# Patient Record
Sex: FEMALE | Race: WHITE | NOT HISPANIC OR LATINO | Employment: STUDENT | URBAN - METROPOLITAN AREA
[De-identification: names, ages, dates, MRNs, and addresses within clinical notes are randomized per-mention and may not be internally consistent; named-entity substitution may affect disease eponyms.]

---
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
## 2020-02-10 ENCOUNTER — HEALTH MAINTENANCE LETTER (OUTPATIENT)
Age: 20
End: 2020-02-10

## 2020-06-17 ENCOUNTER — TELEPHONE (OUTPATIENT)
Dept: FAMILY MEDICINE | Facility: CLINIC | Age: 20
End: 2020-06-17

## 2020-06-17 NOTE — TELEPHONE ENCOUNTER
Reason for call:  Other   Patient called regarding (reason for call): Pt requesting referral to [facility] in [place] for allergy induced asthma.     Additional comments:     Phone number to reach patient:  Cell number on file:    Telephone Information:   Mobile [phone]       Best Time:  anytime    Can we leave a detailed message on this number?  YES

## 2020-06-17 NOTE — TELEPHONE ENCOUNTER
Left detailed VM for pt  Last OV 2018  Advised virtual visit to discuss referral    Therese GUNN RN

## 2020-07-07 ENCOUNTER — VIRTUAL VISIT (OUTPATIENT)
Dept: FAMILY MEDICINE | Facility: CLINIC | Age: 20
End: 2020-07-07
Payer: COMMERCIAL

## 2020-07-07 VITALS — BODY MASS INDEX: 26.5 KG/M2 | WEIGHT: 190 LBS

## 2020-07-07 DIAGNOSIS — F43.23 ADJUSTMENT DISORDER WITH MIXED ANXIETY AND DEPRESSED MOOD: Primary | ICD-10-CM

## 2020-07-07 PROCEDURE — 99214 OFFICE O/P EST MOD 30 MIN: CPT | Mod: 95 | Performed by: FAMILY MEDICINE

## 2020-07-07 RX ORDER — ESCITALOPRAM OXALATE 10 MG/1
10 TABLET ORAL DAILY
Qty: 30 TABLET | Refills: 1 | Status: SHIPPED | OUTPATIENT
Start: 2020-07-07 | End: 2020-08-26

## 2020-07-07 ASSESSMENT — ANXIETY QUESTIONNAIRES
GAD7 TOTAL SCORE: 6
5. BEING SO RESTLESS THAT IT IS HARD TO SIT STILL: NOT AT ALL
2. NOT BEING ABLE TO STOP OR CONTROL WORRYING: SEVERAL DAYS
IF YOU CHECKED OFF ANY PROBLEMS ON THIS QUESTIONNAIRE, HOW DIFFICULT HAVE THESE PROBLEMS MADE IT FOR YOU TO DO YOUR WORK, TAKE CARE OF THINGS AT HOME, OR GET ALONG WITH OTHER PEOPLE: SOMEWHAT DIFFICULT
6. BECOMING EASILY ANNOYED OR IRRITABLE: SEVERAL DAYS
7. FEELING AFRAID AS IF SOMETHING AWFUL MIGHT HAPPEN: SEVERAL DAYS
3. WORRYING TOO MUCH ABOUT DIFFERENT THINGS: SEVERAL DAYS
1. FEELING NERVOUS, ANXIOUS, OR ON EDGE: SEVERAL DAYS

## 2020-07-07 ASSESSMENT — PATIENT HEALTH QUESTIONNAIRE - PHQ9
5. POOR APPETITE OR OVEREATING: SEVERAL DAYS
SUM OF ALL RESPONSES TO PHQ QUESTIONS 1-9: 11

## 2020-07-07 NOTE — NURSING NOTE
"Chief Complaint   Patient presents with     Anxiety     Depression     initial Wt 86.2 kg (190 lb)   BMI 26.50 kg/m   Estimated body mass index is 26.5 kg/m  as calculated from the following:    Height as of 6/25/18: 1.803 m (5' 11\").    Weight as of this encounter: 86.2 kg (190 lb).  BP completed using cuff size: .  L  R arm      Health Maintenance that is potentially due pending provider review:        Laci Mills ma  "

## 2020-07-07 NOTE — PROGRESS NOTES
"Yady Sellers is a 19 year old female who is being evaluated via a billable video visit.      The patient has been notified of following:     \"This video visit will be conducted via a call between you and your physician/provider. We have found that certain health care needs can be provided without the need for an in-person physical exam.  This service lets us provide the care you need with a video conversation.  If a prescription is necessary we can send it directly to your pharmacy.  If lab work is needed we can place an order for that and you can then stop by our lab to have the test done at a later time.    Video visits are billed at different rates depending on your insurance coverage.  Please reach out to your insurance provider with any questions.    If during the course of the call the physician/provider feels a video visit is not appropriate, you will not be charged for this service.\"    Patient has given verbal consent for Video visit? Yes  How would you like to obtain your AVS? MyChart  Patient would like the video invitation sent by:   Will anyone else be joining your video visit? No      Subjective     Yady Slelers is a 19 year old female who presents today via video visit for the following health issues:    HPI  Depression and Anxiety Follow-Up    How are you doing with your depression since your last visit? Worsened     How are you doing with your anxiety since your last visit?  Worsened     Are you having other symptoms that might be associated with depression or anxiety? No    Have you had a significant life event? OTHER: less motivation     Do you have any concerns with your use of alcohol or other drugs? No    Social History     Tobacco Use     Smoking status: Never Smoker     Smokeless tobacco: Never Used   Substance Use Topics     Alcohol use: None     Drug use: None     PHQ 7/7/2020   PHQ-9 Total Score 11   Q9: Thoughts of better off dead/self-harm past 2 weeks Not at all     ROLLY-7 SCORE 7/7/2020 "   Total Score 6           Suicide Assessment Five-step Evaluation and Treatment (SAFE-T)      How many servings of fruits and vegetables do you eat daily?  4 or more    On average, how many sweetened beverages do you drink each day (Examples: soda, juice, sweet tea, etc.  Do NOT count diet or artificially sweetened beverages)?   1    How many days per week do you exercise enough to make your heart beat faster? 4    How many minutes a day do you exercise enough to make your heart beat faster? 30 - 60    How many days per week do you miss taking your medication? 0       Video Start Time: 3:52 PM    Nicholas [employer] but its going to open/hybrid program & is worried how will she manage in [place]  Doing school online was hard but was thinking that fall semster would be better- grieving absence of friends in fall semseter  No symptoms of MDP but had high energy 2 weeks ago- did not sleep over night and wrote a song.  Mom and sister also on selective serotonin reuptake inhibitor. Mom lexpro and sister on citalopram.    Reviewed and updated as needed this visit by Provider         Review of Systems   Constitutional, HEENT, cardiovascular, pulmonary, GI, , musculoskeletal, neuro, skin, endocrine and psych systems are negative, except as otherwise noted.      Objective             Physical Exam     GENERAL: Healthy, alert and no distress  EYES: Eyes grossly normal to inspection.  No discharge or erythema, or obvious scleral/conjunctival abnormalities.  RESP: No audible wheeze, cough, or visible cyanosis.  No visible retractions or increased work of breathing.    SKIN: Visible skin clear. No significant rash, abnormal pigmentation or lesions.  NEURO: Cranial nerves grossly intact.  Mentation and speech appropriate for age.  PSYCH: Mentation appears normal, affect normal/bright, judgement and insight intact, normal speech and appearance well-groomed.      Diagnostic Test Results:  Labs reviewed in Epic  none         Assessment &  Plan   20 yo female with long standing history of anxiety and depression . Willing to star counsleing and medications   lexparo 10 mg once daily       ICD-10-CM    1. Adjustment disorder with mixed anxiety and depressed mood  F43.23 MENTAL HEALTH REFERRAL  - Adult; Outpatient Treatment; Individual/Couples/Family/Group Therapy/Health Psychology; G: [facility] [phone]; We will contact you to schedule the appointment or please call with any questions     escitalopram (LEXAPRO) 10 MG tablet   We discussed the treatment for anxiety and depression in detail.  The importance of a multi faceted approach in controlling symptoms was reviewed.  The benefits of cognitive behavioral therapy reviewed, benefits of exercise, and stress reduction also discussed.      Duration of treatment  Is long months to years. It may take 3-4 weeks before symptom improvement happens.  Do not stop medication suddenly, medication will need to be tapered off.  Slight increased risk of suicide with SSRI group of medications discussed.         Potential medication side effects were discussed with the patient; let me know if any occur.         Regular exercise    Return in about 2 weeks (around 7/21/2020) for virtual/video visit.    Adriana Jimenez MD  [facility]      Video-Visit Details    Type of service:  Video Visit    Video End Time:4:13 PM    Originating Location (pt. Location): Home    Distant Location (provider location):  [facility]     Platform used for Video Visit: AmWell    Return in about 2 weeks (around 7/21/2020) for virtual/video visit.       Adriana Jimenez MD

## 2020-07-08 ASSESSMENT — ANXIETY QUESTIONNAIRES: GAD7 TOTAL SCORE: 6

## 2020-07-27 ASSESSMENT — ANXIETY QUESTIONNAIRES
7. FEELING AFRAID AS IF SOMETHING AWFUL MIGHT HAPPEN: NOT AT ALL
3. WORRYING TOO MUCH ABOUT DIFFERENT THINGS: SEVERAL DAYS
5. BEING SO RESTLESS THAT IT IS HARD TO SIT STILL: NOT AT ALL
6. BECOMING EASILY ANNOYED OR IRRITABLE: NOT AT ALL
GAD7 TOTAL SCORE: 4
2. NOT BEING ABLE TO STOP OR CONTROL WORRYING: SEVERAL DAYS
7. FEELING AFRAID AS IF SOMETHING AWFUL MIGHT HAPPEN: NOT AT ALL
1. FEELING NERVOUS, ANXIOUS, OR ON EDGE: SEVERAL DAYS
GAD7 TOTAL SCORE: 4
4. TROUBLE RELAXING: SEVERAL DAYS

## 2020-07-27 ASSESSMENT — PATIENT HEALTH QUESTIONNAIRE - PHQ9
SUM OF ALL RESPONSES TO PHQ QUESTIONS 1-9: 5
10. IF YOU CHECKED OFF ANY PROBLEMS, HOW DIFFICULT HAVE THESE PROBLEMS MADE IT FOR YOU TO DO YOUR WORK, TAKE CARE OF THINGS AT HOME, OR GET ALONG WITH OTHER PEOPLE: SOMEWHAT DIFFICULT
SUM OF ALL RESPONSES TO PHQ QUESTIONS 1-9: 5

## 2020-07-28 ENCOUNTER — VIRTUAL VISIT (OUTPATIENT)
Dept: PSYCHOLOGY | Facility: CLINIC | Age: 20
End: 2020-07-28
Payer: COMMERCIAL

## 2020-07-28 DIAGNOSIS — F32.1 MAJOR DEPRESSIVE DISORDER, SINGLE EPISODE, MODERATE (H): ICD-10-CM

## 2020-07-28 DIAGNOSIS — F41.1 GAD (GENERALIZED ANXIETY DISORDER): Primary | ICD-10-CM

## 2020-07-28 PROCEDURE — 90791 PSYCH DIAGNOSTIC EVALUATION: CPT | Mod: 95 | Performed by: SOCIAL WORKER

## 2020-07-28 ASSESSMENT — COLUMBIA-SUICIDE SEVERITY RATING SCALE - C-SSRS
ATTEMPT PAST THREE MONTHS: NO
1. IN THE PAST MONTH, HAVE YOU WISHED YOU WERE DEAD OR WISHED YOU COULD GO TO SLEEP AND NOT WAKE UP?: NO
1. IN THE PAST MONTH, HAVE YOU WISHED YOU WERE DEAD OR WISHED YOU COULD GO TO SLEEP AND NOT WAKE UP?: NO
ATTEMPT LIFETIME: NO

## 2020-07-28 ASSESSMENT — PATIENT HEALTH QUESTIONNAIRE - PHQ9: SUM OF ALL RESPONSES TO PHQ QUESTIONS 1-9: 5

## 2020-07-28 ASSESSMENT — ANXIETY QUESTIONNAIRES: GAD7 TOTAL SCORE: 4

## 2020-07-28 NOTE — PROGRESS NOTES
"  [facility]  Evaluator Name:  Elle Gonzalez     Credentials:  MSW, LICSW LMFT    PATIENT'S NAME: Yady Sellers  PREFERRED NAME: Yady  PREFERRED PRONOUNS:   lauren    MRN:   [id number]  :   2000   ACCT. NUMBER: [id number]  DATE OF SERVICE: 20  START TIME: 2:15PM    Late start. Video would not connect  END TIME: 3:15PM  PREFERRED PHONE: [phone]  May we leave a program related message: Yes  Service Modality:  Video Visit:    Telemedicine Visit: The patient's condition can be safely assessed and treated via synchronous audio and visual telemedicine encounter.      Reason for Telemedicine Visit: COVID 19 restricitions    Originating Site (Patient Location): Patient's home    [facility]    Consent:  The patient/guardian has verbally consented to: the potential risks and benefits of telemedicine (video visit) versus in person care; bill my insurance or make self-payment for services provided; and responsibility for payment of non-covered services.     Patient would like the video invitation sent by: Text to cell phone: [phone]  or via [employer]}     Mode of Communication:  Video Conference via [employer]    As the provider I attest to compliance with applicable laws and regulations related to telemedicine.    STANDARD ADULT DIAGNOSTIC ASSESSMENT      Identifying Information:  Patient is a 19 year old, .  The pronoun use throughout this assessment reflects the patient's chosen pronoun.  Patient was referred for an assessment by primary care provider.  Patient attended the session alone.     Chief Complaint:   The reason for seeking services at this time is: \" anxiety and depression \"   The problem(s) began the anxiety started when she was young. The depression started with returning from college due to COVID 19 in March/April. Patient has attempted to resolve these concerns in the past through therapy in collegein her " Freshman and sophomore years. She has begun Lexapro through her PCP over the past few weeks.    Social/Family History:  Patient reported they grew up in [place], [place].  They were raised by biological parents.   stayed ..   Patient reported that  her   childhood was okay.  Patient described their current relationships with family of origin as good.      The patient describes their cultural background as .  Cultural influences and impact on patient's life structure, values, norms, and healthcare: none reported.  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    These factors will be addressed in the Preliminary Treatment plan.  Patient identified their preferred language to be English. Patient reported they does not need the assistance of an  or other support involved in therapy.     Patient reported had no significant delays in developmental tasks.   Patient's highest education level was has completed her sophomore year at [employer]. Plans on going to medical school when she completes college. Patient identified the following learning problems: none reported.  Modifications will not be used to assist communication in therapy.   Patient reports she is  able to understand written materials.    Patient reported the following relationship history: reported no significant romantic relationships.  Patient's current relationship status is single .   Patient identified their sexual orientation as not reported.  Patient reported having zero child(art). Patient identified parents, siblings and friends as part of their support system.  Patient identified the quality of these relationships as good.      Patient's current living/housing situation involves staying with parents and older sister in her parents  home. 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 She reports that housing is stable.     Patient is currently not working. Is taking a [employer] On line class from the .[employer] [facility] [employer]  Patient reports their finances are obtained through parents.  Patient does not identify finances as a current stressor.      Patient reported that they have not been involved with the legal system. Patient denies being on probation / parole / under the jurisdiction of the court.        Patient's Strengths and Limitations:  Patient identified the following strengths or resources that will help them succeed in treatment: friends / good social support, family support, insight, intelligence, positive school connection, motivation and work ethic. Things that may interfere with the patient's success in treatment include: COVID 19 restrictions, current symptoms.   _______________________________________________  Personal and Family Medical History:   Patient did report a family history of mental health concerns. Her mother and sister struggle with depression and anxiety. Her sister was taking a year off of college due to her depression.Client has grown up with this depression as part of her life. Her  Maternal aunt is bipolar. Her paternal grandmother has bipolar disorder. Her paternal great uncle is schizophrenic .     Patient reported the following previous diagnoses which include(s): is not sure, but assumes anxiety.  Patient reported anxiety  symptoms began when she was young,. A psychiatrist at college asked her about phobias She remembers in 3rd-4th grade being afraid to be in elevators. When she was in 7th grade she was afraid of walking alone. She had a violin lesson 2 blocks from their home and was afraid to walk that distance. She sometimes will feel that feeling of un safety now. She was fine when she left college, though wax being  cautious. She feels she has gotten fearful of COVID 19, though does have asthma. She feels less fearful now, but needs to limit the amount of news she watches. She has been able to have some in person time with friends. She has chosen not to return to college in the Fall, but to take her classes on line. The depression is new. She has experienced those symptoms since returning home form college in March/Aprl  Patient has received mental health services in the past: therapy with therapist at college in her Freshman year of college and beginning of Sophomore year, before returning home due to COVID 19.  Psychiatric Hospitalizations: None.  Patient denies a history of civil commitment.  Currently, patient is receiving other mental health services.  These include primary care provider at [facility].  For follow-up on medication.   Patient has had a physical exam to rule out medical causes for current symptoms.  Date of last physical exam was within the past year. Client was encouraged to follow up with PCP if symptoms were to develop. The patient has a [facility] Primary Care Provider, who is named Saul Ramires, and is currently seeing Dr. Adriana Jimenez..  Patient reports the following current medical concerns: asthma .  There are not significant appetite / nutritional concerns / weight changes.  She has gained a small amount of weight during COVID 19 Patient does not report a history of head injury / trauma / cognitive impairment.      Patient reports current meds as:   Lexapro 10 mg    Medication Adherence:  Patient reports taking prescribed medications as prescribed.    Patient Allergies:  No Known Allergies    Medical History:  asthma      Current Mental Status Exam:   Appearance:  Appropriate    Eye Contact:  Good   Psychomotor:  unable to assess due to video session       Gait / station:  Unable to assess  Attitude / Demeanor: Cooperative   Speech      Rate / Production: Normal/ Responsive       Volume:  Normal  volume      Language:  intact  Mood:   Anxious  Normal  Affect:   Appropriate    Thought Content: Clear   Thought Process: Coherent  Logical       Associations: No loosening of associations  Insight:   Good   Judgment:  Intact   Orientation:  All  Attention/concentration: Good    Rating Scales:    PHQ9:    PHQ-9 SCORE 7/7/2020 7/27/2020 7/27/2020   PHQ-9 Total Score MyChart - - 5 (Mild depression)   PHQ-9 Total Score 11 5 5   ;    GAD7:    ROLLY-7 SCORE 7/7/2020 7/27/2020 7/27/2020   Total Score - - 4 (minimal anxiety)   Total Score 6 4 4     CGI:     First:Considering your total clinical experience with this particular patient population, how severe are the patient's symptoms at this time?: 4 (7/28/2020  5:00 PM)  ;    Most recentCompared to the patient's condition at the START of treatment, this patient's condition is: 4 (7/28/2020  5:00 PM)      Substance Use:  Patient did report a family history of substance use concerns; see medical history section for details.She did not have details but repotted alcoholism ran in her family  Patient has not received chemical dependency treatment in the past.  Patient has not ever been to detox.      Patient is not currently receiving any chemical dependency treatment. Patient reported the following problems as a result of their substance use: none reported.    Patient denies using alcohol.very often. Sometimes has a glass of wine with her parents. In college would go out once a month and drink 4-5 drinks at a time. Client encouraged to discuss alcohol use with medication with PCP  Patient denies using tobacco.  Patient denies using marijuana.  Patient reports using caffeine 1 times per day and drinks 1 at a time. Patient started using caffeine at age not reported. Will dirnk one cup of coffee in moring and one in afernoon at college.  Patient reports using/abusing the following substance(s). Patient reported no other substance use.     CAGE- AID:    CAGE-AID  Total Score 7/28/2020   Total Score 0       Substance Use: No symptoms    Based on the negative CAGE score and clinical interview there  are not indications of drug or alcohol abuse.    Significant Losses / Trauma / Abuse / Neglect Issues:   Patient did not serve in the .  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  Concerns for possible neglect are not present.     Safety Assessment:   Current Safety Concerns:  [place] Suicide Severity Rating Scale (Lifetime/Recent)  [place] Suicide Severity Rating (Lifetime/Recent) 7/28/2020   1. Wish to be Dead (Lifetime) No   1. Wish to be Dead (Recent) No   Actual Attempt (Lifetime) No   Actual Attempt (Past 3 Months) No     Patient denies current homicidal ideation and behaviors.  Patient denies current self-injurious ideation and behaviors.    Patient denied risk behaviors associated with substance use.  Patient denies any high risk behaviors associated with mental health symptoms.  Patient reports the following current concerns for their personal safety: None.  Patient reports there are not  firearms in the house.      History of Safety Concerns:  Patient denied a history of homicidal ideation.     Patient denied a history of personal safety concerns.    Patient denied a history of assaultive behaviors.    Patient denied a history of sexual assault behaviors.     Patient denied a history of risk behaviors associated with substance use.  Patient denies any history of high risk behaviors associated with mental health symptoms.  Patient reports the following protective factors: positive  relationships positive social network, forward/future oriented thinking, dedication to family/friends, purpose plans on going to medical school, secure attachment, adherence with prescribed medication, living with other people, structured day, committment to well-being and sense of meaning      Answers for HPI/ROS submitted by the patient on 7/27/2020   ROLLY 7 TOTAL SCORE: 4  If you checked off any problems, how difficult have these problems made it for you to do your work, take care of things at home, or get along with other people?: Somewhat difficult  PHQ9 TOTAL SCORE: 5      Risk Plan:  See Preliminary Treatment Plan for Safety and Risk Management Plan    Review of Symptoms per patient report:  Depression: No symptoms, Lack of interest, Change in energy level, Difficulties concentrating, Psychomotor slowing or agitation and Feeling sad, down, or depressed  Josefa:  No Symptoms  Psychosis: No Symptoms  Anxiety: Excessive worry, Ruminations and Poor concentration  Panic:  No symptoms  Post Traumatic Stress Disorder:  No Symptoms   Eating Disorder: No Symptoms  ADD / ADHD:  No symptoms  Conduct Disorder: No symptoms  Autism Spectrum Disorder: No symptoms  Obsessive Compulsive Disorder: No Symptoms    Patient reports the following compulsive behaviors and treatment history: none reported.      Diagnostic Criteria:   A. Excessive anxiety and worry about a number of events or activities (such as work or school performance).   B. The person finds it difficult to control the worry.   - Restlessness or feeling keyed up or on edge.    - Being easily fatigued.    - Difficulty concentrating or mind going blank.    - Muscle tension.   D. The focus of the anxiety and worry is not confined to features of an Axis I disorder.  E. The anxiety, worry, or physical symptoms cause clinically significant distress or impairment in social, occupational, or other important areas of functioning.   F. The disturbance is not due to the direct  physiological effects of a substance (e.g., a drug of abuse, a medication) or a general medical condition (e.g., hyperthyroidism) and does not occur exclusively during a Mood Disorder, a Psychotic Disorder, or a Pervasive Developmental Disorder.    - The aformentioned symptoms began 15 year(s) ago and occurs 7 days per week and is experienced as moderate.  A) Single episode - symptoms have been present during the same 2-week period and represent a change from previous functioning 5 or more symptoms (required for diagnosis)   - Depressed mood. Note: In children and adolescents, can be irritable mood.     - Diminished interest or pleasure in all, or almost all, activities.    - Psychomotor activity retardation.    - Fatigue or loss of energy.    - Diminished ability to think or concentrate, or indecisiveness.   B) The symptoms cause clinically significant distress or impairment in social, occupational, or other important areas of functioning  C) The episode is not attributable to the physiological effects of a substance or to another medical condition  D) The occurence of major depressive episode is not better explained by other thought / psychotic disorders  E) There has never been a manic episode or hypomanic episode    Functional Status:  Patient reports the following functional impairments: academic performance, home life with family, relationship(s) and self-care.     WHODAS:   WHODAS 2.0 Total Score 7/28/2020   Total Score 15       Clinical Summary:  1. Reason for assessment: depression and anxiety  .  2. Psychosocial, Cultural and Contextual Factors: sister has significant depression, mother has depression, COVID 19 restrictions. Loss of summer internship at college  .  3. Principal DSM5 Diagnoses  (Sustained by DSM5 Criteria Listed Above):   296.22 (F32.1)  Major Depressive Disorder, Single Episode, Moderate _ and With mixed features  300.02 (F41.1) Generalized Anxiety Disorder.  4. Other Diagnoses that is  relevant to services:   None at this time  5. Provisional Diagnosis:  None at this time .  6. Prognosis: Expect Improvement.  7. Likely consequences of symptoms if not treated: has started medication with some decrease in symptoms, but has benefited from therapy in past,  Will likely struggle longer without support.  8. Client strengths include:  caring, educated, empathetic, goal-focused, has a previous history of therapy, insightful, intelligent, motivated and open to learning .     Recommendations:     1. Plan for Safety and Risk Management:Recommended that patient call 911 or go to the local ED should there be a change in any of these risk factors..  Report to child / adult protection services was NA.     2. Patient's identified none reported.     3. Initial Treatment will focus on: Depressed Mood - decrease symptoms  Anxiety - decrease symptoms.     4. Resources/Service Plan:       services are not indicated.     Modifications to assist communication are not indicated.     Additional disability accommodations are not indicated.      5. Collaboration:  Collaboration / coordination of treatment will be initiated with the following support professionals: primary care physician.      6.  Referrals:  The following referral(s) will be initiated: none at this time. Next Scheduled Appointment: 8/5/2020. Meeting with PCP for medication recheck on 8/3.  A Release of Information will be offered for the following: outpatient therapist at college.    7. LUDA: LUDA:  Discussed the general effects of drugs and alcohol on health and well-being. Provider will mail patient printed information about the effects of chemical use on their health and well being. Recommendations:  Discuss medication and alcohol use with PCP before returning to college .     8. Records were reviewed at time of assessment.  Information in this assessment was obtained from the medical record and provided by patient who is a good historian.    Patient will have open access to their mental health medical record.      Eval type:  Mental Health    Staff Name/Credentials:  Elle Gonzalez [facility], LMFT  July 28, 2020

## 2020-08-03 ENCOUNTER — APPOINTMENT (OUTPATIENT)
Dept: FAMILY MEDICINE | Facility: CLINIC | Age: 20
End: 2020-08-03
Payer: COMMERCIAL

## 2020-08-05 ENCOUNTER — VIRTUAL VISIT (OUTPATIENT)
Dept: PSYCHOLOGY | Facility: CLINIC | Age: 20
End: 2020-08-05
Payer: COMMERCIAL

## 2020-08-05 DIAGNOSIS — F32.1 MAJOR DEPRESSIVE DISORDER, SINGLE EPISODE, MODERATE (H): ICD-10-CM

## 2020-08-05 DIAGNOSIS — F41.1 GAD (GENERALIZED ANXIETY DISORDER): Primary | ICD-10-CM

## 2020-08-05 PROCEDURE — 90837 PSYTX W PT 60 MINUTES: CPT | Mod: 95 | Performed by: SOCIAL WORKER

## 2020-08-05 PROCEDURE — 99207 ZZC CDG-CODE INCORRECT PER BILLING BASED ON TIME: CPT | Performed by: SOCIAL WORKER

## 2020-08-05 NOTE — LETTER
First session after intake assessment. No longer experiencing side affects. Unsure if medication is kicked in yet, feels some improvement. If no increaed improvement may need higher dose.

## 2020-08-05 NOTE — PROGRESS NOTES
Progress Note    Patient Name: Yady Sellers  Date: 8/5/2020         Service Type: Individual      Session Start Time: 2:05PM  Session End Time: 3:00PM     Session Length: 55 minutes    Session #:2    Attendees: Client    Service Modality:  Video Visit:    Telemedicine Visit: The patient's condition can be safely assessed and treated via synchronous audio and visual telemedicine encounter.      Reason for Telemedicine Visit: COVID 19 restricitions    Originating Site (Patient Location): Patient's home    Distant Site (Provider Location): [facility] [place]    Consent:  The patient/guardian has verbally consented to: the potential risks and benefits of telemedicine (video visit) versus in person care; bill my insurance or make self-payment for services provided; and responsibility for payment of non-covered services.     Patient would like the video invitation sent by: Text to cell phone: [phone]}     Mode of Communication:  Video Conference via Amwell    As the provider I attest to compliance with applicable laws and regulations related to telemedicine.     Treatment Plan Last Reviewed:will be written on this date 8/5/2020  PHQ-9 / ROLLY-7 : 7/28/2020    DATA  Interactive Complexity: No  Crisis: No       Progress Since Last Session (Related to Symptoms / Goals / Homework):   Symptoms: No change first session after intake assessment    Homework: Achieved / completed to satisfaction flynn goals for treatment plan      Episode of Care Goals: Minimal progress - PREPARATION (Decided to change - considering how); Intervened by negotiating a change plan and determining options / strategies for behavior change, identifying triggers, exploring social supports, and working towards setting a date to begin behavior change     Current / Ongoing Stressors and Concerns:   COVID 19 restrictions              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 Discussed preparing for on line college, and making a scheudle for breaks, eating, social time and exercise, as there will not be these natural breaks in her on line experience. Client feels good about her decision, but is missing her friends. Explored how to continue to stay connected.Client is feeling some benefits from medication, but feels could use more. Unsure if medication is not fully kicked in yet, or, if client needs higher dose. Client will discus with PCP        ASSESSMENT: Current Emotional / Mental Status (status of significant symptoms):   Risk status (Self / Other harm or suicidal ideation)   Patient denies current fears or concerns for personal safety.   Patient denies current or recent suicidal ideation or behaviors.   Patient denies current or recent homicidal ideation or behaviors.   Patient denies current or recent self injurious behavior or ideation.   Patient denies other safety concerns.   Patient reports there has been no change in risk factors since their last session.     Patient reports there has been no change in protective factors since their last session.     Recommended that patient call 911 or go to the local ED should there be a change in any of these risk factors.     Appearance:   Appropriate    Eye Contact:   Good    Psychomotor Behavior: unable to assess due to video session    Attitude:   Cooperative    Orientation:   All   Speech    Rate / Production: Normal     Volume:  Normal    Mood:    anxious, tearful   Affect:    Appropriate  Tearful   Thought  Content:  Clear    Thought Form:  Coherent  Logical    Insight:    Good      Medication Review:   No changes to current psychiatric medication(s)Will be meeitn with PCP to reassess. Unclear if not fully working yet,or need higher dose     Medication Compliance:   Yes     Changes in Health Issues:   None reported     Chemical Use Review:   Substance Use: Chemical use reviewed, no active concerns identified      Tobacco Use: No current tobacco use.      Diagnosis:  1. ROLLY (generalized anxiety disorder)    2. Major depressive disorder, single episode, moderate (H)        Collateral Reports Completed:   Routed note to PCP    PLAN: (Patient Tasks / Therapist Tasks / Other)  Prepare schedule and space for on line college classes  Consult with PCP about medication  Will return in  2 weeks        LOREN Segovia LMELVIA                                                         ______________________________________________________________________    Treatment Plan    Patient's Name: Yady Sellers  YOB: 2000    Date: 8/5/2020    DSM5 Diagnoses: 296.22 (F32.1)  Major Depressive Disorder, Single Episode, Moderate _ and With mixed features or 300.02 (F41.1) Generalized Anxiety Disorder  Psychosocial / Contextual Factors: COVID 19 restrictions                                                                Planning on line classes from home for Fall due to COVID 19                                                                Limited social contacts                                                                Sister with history of significant depression and anxiety                                                                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    Anticipated number of session or this episode of care: will review in 90  days      MeasurableTreatment Goal(s) related to diagnosis / functional impairment(s)  Goal 1: Patient will decrease anxiety symptoms    I will know I've met my goal when I can manage my symptoms.      Objective #A (Patient Action)    Patient will identify 2 fears / thoughts that contribute to feeling anxious.  Status: New - Date: 8/5/2020     Intervention(s)  Therapist will teach emotional recognition/identification. skills.    Objective #B  Patient will use at least 2 coping skills for anxiety management in the next few weeks.  Status: New - Date: 8/5/2020     Intervention(s)  Therapist will teach emotional regulation skills. for anxiety.    Objective #C  Patient will identify three distraction and diversion activities and use those activities to decrease level of anxiety  .  Status: New - Date: 8/5/2020     Intervention(s)  Therapist will teach distraction skills. and diversion skills.      Goal 2: Patient will build skills to manage anxiety reactions    I will know I've met my goal when I have skills to manage my anxiety and feel more resilient.      Objective #A (Patient Action)    Status: New - Date: 8/5/2020     Patient will identify at least 2 triggers for anxiety.    Intervention(s)  Therapist will teach emotional recognition/identification. for triggers.    Objective #B  Patient will use thought-stopping strategy daily to reduce intrusive thoughts.    Status: New - Date: 8/5/2020     Intervention(s)  Therapist will teach Cognitive Behavioral Skills.    Objective #C  Patient will use cognitive strategies identified in therapy to challenge anxious thoughts.  Status: New - Date: 8/5/2020     Intervention(s)  Therapist will teach Cognitive Behavioral Skills.      Patient will be mailed treatment plan for review and signature .      Elle Gonzalez, [facility] LM  August 5, 2020

## 2020-08-05 NOTE — PATIENT INSTRUCTIONS
Set up schedule and space for online fall college classes    ___________________________________________________    Treatment Plan    Patient's Name: Yady Sellers  YOB: 2000    Date: 8/5/2020    DSM5 Diagnoses: 296.22 (F32.1)  Major Depressive Disorder, Single Episode, Moderate _ and With mixed features or 300.02 (F41.1) Generalized Anxiety Disorder  Psychosocial / Contextual Factors: COVID 19 restrictions                                                                Planning on line classes from home for Fall due to COVID 19                                                                Limited social contacts                                                                Sister with history of significant depression and anxiety                                                                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    Anticipated number of session or this episode of care: will review in 90 days      MeasurableTreatment Goal(s) related to diagnosis / functional impairment(s)  Goal 1: Patient will decrease anxiety symptoms    I will know I've met my goal when I can manage my symptoms.      Objective #A (Patient Action)    Patient will identify 2 fears / thoughts that contribute to feeling anxious.  Status: New - Date: 8/5/2020     Intervention(s)  Therapist will teach emotional recognition/identification. skills.    Objective #B  Patient will use at least 2 coping skills for anxiety management in the next few weeks.  Status: New - Date: 8/5/2020     Intervention(s)  Therapist will teach emotional regulation skills. for anxiety.    Objective #C  Patient will identify three distraction and diversion activities and use those activities to decrease level of anxiety  .  Status: New - Date: 8/5/2020     Intervention(s)  Therapist will teach  distraction skills. and diversion skills.      Goal 2: Patient will build skills to manage anxiety reactions    I will know I've met my goal when I have skills to manage my anxiety and feel more resilient.      Objective #A (Patient Action)    Status: New - Date: 8/5/2020     Patient will identify at least 2 triggers for anxiety.    Intervention(s)  Therapist will teach emotional recognition/identification. for triggers.    Objective #B  Patient will use thought-stopping strategy daily to reduce intrusive thoughts.    Status: New - Date: 8/5/2020     Intervention(s)  Therapist will teach Cognitive Behavioral Skills.    Objective #C  Patient will use cognitive strategies identified in therapy to challenge anxious thoughts.  Status: New - Date: 8/5/2020     Intervention(s)  Therapist will teach Cognitive Behavioral Skills.      Patient will be mailed treatment plan for review and signature .      Elle Gonzalez [facility]  August 5, 2020

## 2020-08-19 ENCOUNTER — VIRTUAL VISIT (OUTPATIENT)
Dept: PSYCHOLOGY | Facility: CLINIC | Age: 20
End: 2020-08-19
Payer: COMMERCIAL

## 2020-08-19 DIAGNOSIS — F41.1 GAD (GENERALIZED ANXIETY DISORDER): Primary | ICD-10-CM

## 2020-08-19 DIAGNOSIS — F32.1 MAJOR DEPRESSIVE DISORDER, SINGLE EPISODE, MODERATE (H): ICD-10-CM

## 2020-08-19 PROCEDURE — 90834 PSYTX W PT 45 MINUTES: CPT | Mod: GT | Performed by: SOCIAL WORKER

## 2020-08-19 ASSESSMENT — ANXIETY QUESTIONNAIRES
6. BECOMING EASILY ANNOYED OR IRRITABLE: NOT AT ALL
3. WORRYING TOO MUCH ABOUT DIFFERENT THINGS: NOT AT ALL
GAD7 TOTAL SCORE: 1
4. TROUBLE RELAXING: NOT AT ALL
2. NOT BEING ABLE TO STOP OR CONTROL WORRYING: NOT AT ALL
7. FEELING AFRAID AS IF SOMETHING AWFUL MIGHT HAPPEN: NOT AT ALL
1. FEELING NERVOUS, ANXIOUS, OR ON EDGE: SEVERAL DAYS
5. BEING SO RESTLESS THAT IT IS HARD TO SIT STILL: NOT AT ALL

## 2020-08-19 ASSESSMENT — PATIENT HEALTH QUESTIONNAIRE - PHQ9: SUM OF ALL RESPONSES TO PHQ QUESTIONS 1-9: 7

## 2020-08-19 NOTE — LETTER
client had rough week last week, and better week this week. Will discuss possible medication increase with PCP at medication review. Unsure if needs it, but if symptoms increase regularly again, would like to try increase.

## 2020-08-19 NOTE — PATIENT INSTRUCTIONS
Talk with PCP about medication  Prepare space for semester studying. Prepare schedule  Contacts with peers, mediation and yoga.

## 2020-08-19 NOTE — LETTER
rough week last week, better week this week. Will discuss possible medication increase with PCP at medication review

## 2020-08-19 NOTE — PROGRESS NOTES
Progress Note    Patient Name: Yady Sellers  Date: 8/19/2020         Service Type: Individual      Session Start Time: 2:05PM  Session End Time: 3:00PM     Session Length: 55 minutes    Session #:3    Attendees: Client    Service Modality:  Video Visit:    Telemedicine Visit: The patient's condition can be safely assessed and treated via synchronous audio and visual telemedicine encounter.      Reason for Telemedicine Visit: COVID 19 restricitions    Originating Site (Patient Location): Patient's home    Distant Site (Provider Location): [facility] [place]    Consent:  The patient/guardian has verbally consented to: the potential risks and benefits of telemedicine (video visit) versus in person care; bill my insurance or make self-payment for services provided; and responsibility for payment of non-covered services.     Patient would like the video invitation sent by: Text to cell phone: [phone]}     Mode of Communication:  Video Conference via Amwell    As the provider I attest to compliance with applicable laws and regulations related to telemedicine.     Treatment Plan Last Reviewed: 8/5/2020  PHQ-9 / ROLLY-7 : 8/19/2020    DATA  Interactive Complexity: No  Crisis: No       Progress Since Last Session (Related to Symptoms / Goals / Homework):   Symptoms: Improving rough week last week, better this week    Homework: Achieved / completed to satisfaction talked with father about study space      Episode of Care Goals: Satisfactory progress - PREPARATION (Decided to change - considering how); Intervened by negotiating a change plan and determining options / strategies for behavior change, identifying triggers, exploring social supports, and working towards setting a date to begin behavior change     Current / Ongoing Stressors and Concerns:   COVID 19 restrictions              Lost opportunity for internship at [employer] this summer due to COVID  19              plans on taking online college from home this Fall              Sister struggles with some significant depression and anxiety              Little in person contact with peers     Treatment Objective(s) Addressed in This Session:   use at least 2 coping skills for anxiety management in the next few weeks  update     Intervention:   Psychodynamic: client rough wek last week and better this week. Client wonders of sleeping late and having little activity was a part of it. Felt very lethargic. This week added meditation, yoga, peer contact, and projects (care packages for roommates at college). Discussed talking with PCP about waiting , or adding higher dose of medication. Client has talked with father about making her a study space in house outside of room. Client glad she has chosen to stay home this semester. Is worried about scheduling day, will make  a tentative plan when she gets her schedule. Discussed need for breaks, movement and social contacts that would naturally come up in college day        ASSESSMENT: Current Emotional / Mental Status (status of significant symptoms):   Risk status (Self / Other harm or suicidal ideation)   Patient denies current fears or concerns for personal safety.   Patient denies current or recent suicidal ideation or behaviors.   Patient denies current or recent homicidal ideation or behaviors.    No changes reported   Patient denies current or recent self injurious behavior or ideation.   Patient denies other safety concerns.   Patient reports there has been no change in risk factors since their last session.     Patient reports there has been no change in protective factors since their last session.     Recommended that patient call 911 or go to the local ED should there be a change in any of these risk factors.     Appearance:   Appropriate    Eye Contact:   Good    Psychomotor Behavior: unable to assess due to video session    Attitude:   Cooperative  Pleasant  relaxed    Orientation:   All   Speech    Rate / Production: Normal     Volume:  Normal    Mood:    happy calm   Affect:    calm, happy    Thought Content:  Clear    Thought Form:  Coherent  Logical    Insight:    Good      Medication Review:   No changes to current psychiatric medication(s)Will be meeting with PCP to reassess. Unclear if need higher dose     Medication Compliance:   Yes     Changes in Health Issues:   None reported     Chemical Use Review:   Substance Use: Chemical use reviewed, no active concerns identified      Tobacco Use: No current tobacco use.      Diagnosis:  1. ROLLY (generalized anxiety disorder)    2. Major depressive disorder, single episode, moderate (H)        Collateral Reports Completed:   Routed note to PCP    PLAN: (Patient Tasks / Therapist Tasks / Other)  Prepare schedule and space for on line college classes  Consult with PCP about medication  Continue activity, meditation, yoga and peer contact  Will return next  week        LOREN Segovia                                                         ______________________________________________________________________    Treatment Plan    Patient's Name: Yady Sellers  YOB: 2000    Date: 8/5/2020    DSM5 Diagnoses: 296.22 (F32.1)  Major Depressive Disorder, Single Episode, Moderate _ and With mixed features or 300.02 (F41.1) Generalized Anxiety Disorder  Psychosocial / Contextual Factors: COVID 19 restrictions                                                                Planning on line classes from home for Fall due to COVID 19                                                                Limited social contacts                                                                Sister with history of significant depression and anxiety                                                                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    Anticipated number of session or this episode of care: will review in 90 days      MeasurableTreatment Goal(s) related to diagnosis / functional impairment(s)  Goal 1: Patient will decrease anxiety symptoms    I will know I've met my goal when I can manage my symptoms.      Objective #A (Patient Action)    Patient will identify 2 fears / thoughts that contribute to feeling anxious.  Status: New - Date: 8/5/2020     Intervention(s)  Therapist will teach emotional recognition/identification. skills.    Objective #B  Patient will use at least 2 coping skills for anxiety management in the next few weeks.  Status: New - Date: 8/5/2020     Intervention(s)  Therapist will teach emotional regulation skills. for anxiety.    Objective #C  Patient will identify three distraction and diversion activities and use those activities to decrease level of anxiety  .  Status: New - Date: 8/5/2020     Intervention(s)  Therapist will teach distraction skills. and diversion skills.      Goal 2: Patient will build skills to manage anxiety reactions    I will know I've met my goal when I have skills to manage my anxiety and feel more resilient.      Objective #A (Patient Action)    Status: New - Date: 8/5/2020     Patient will identify at least 2 triggers for anxiety.    Intervention(s)  Therapist will teach emotional recognition/identification. for triggers.    Objective #B  Patient will use thought-stopping strategy daily to reduce intrusive thoughts.    Status: New - Date: 8/5/2020     Intervention(s)  Therapist will teach Cognitive Behavioral Skills.    Objective #C  Patient will use cognitive strategies identified in therapy to challenge anxious thoughts.  Status: New - Date: 8/5/2020     Intervention(s)  Therapist will teach Cognitive Behavioral Skills.      Patient  mailed treatment plan for review and signature .      Elle Gonzalez, [facility]  LMFT  August 19, 2020

## 2020-08-20 ASSESSMENT — ANXIETY QUESTIONNAIRES: GAD7 TOTAL SCORE: 1

## 2020-08-26 ENCOUNTER — VIRTUAL VISIT (OUTPATIENT)
Dept: FAMILY MEDICINE | Facility: CLINIC | Age: 20
End: 2020-08-26
Payer: COMMERCIAL

## 2020-08-26 DIAGNOSIS — F43.23 ADJUSTMENT DISORDER WITH MIXED ANXIETY AND DEPRESSED MOOD: ICD-10-CM

## 2020-08-26 PROCEDURE — 99214 OFFICE O/P EST MOD 30 MIN: CPT | Mod: 95 | Performed by: FAMILY MEDICINE

## 2020-08-26 RX ORDER — ESCITALOPRAM OXALATE 10 MG/1
10 TABLET ORAL DAILY
Qty: 90 TABLET | Refills: 1 | Status: SHIPPED | OUTPATIENT
Start: 2020-08-26 | End: 2020-10-19

## 2020-08-26 ASSESSMENT — ANXIETY QUESTIONNAIRES
1. FEELING NERVOUS, ANXIOUS, OR ON EDGE: SEVERAL DAYS
GAD7 TOTAL SCORE: 2
6. BECOMING EASILY ANNOYED OR IRRITABLE: SEVERAL DAYS
3. WORRYING TOO MUCH ABOUT DIFFERENT THINGS: NOT AT ALL
2. NOT BEING ABLE TO STOP OR CONTROL WORRYING: NOT AT ALL
5. BEING SO RESTLESS THAT IT IS HARD TO SIT STILL: NOT AT ALL
7. FEELING AFRAID AS IF SOMETHING AWFUL MIGHT HAPPEN: NOT AT ALL

## 2020-08-26 ASSESSMENT — PATIENT HEALTH QUESTIONNAIRE - PHQ9
SUM OF ALL RESPONSES TO PHQ QUESTIONS 1-9: 3
5. POOR APPETITE OR OVEREATING: NOT AT ALL

## 2020-08-26 NOTE — PROGRESS NOTES
"Yady Sellers is a 19 year old female who is being evaluated via a billable video visit.      The patient has been notified of following:     \"This video visit will be conducted via a call between you and your physician/provider. We have found that certain health care needs can be provided without the need for an in-person physical exam.  This service lets us provide the care you need with a video conversation.  If a prescription is necessary we can send it directly to your pharmacy.  If lab work is needed we can place an order for that and you can then stop by our lab to have the test done at a later time.    Video visits are billed at different rates depending on your insurance coverage.  Please reach out to your insurance provider with any questions.    If during the course of the call the physician/provider feels a video visit is not appropriate, you will not be charged for this service.\"    Patient has given verbal consent for Video visit? Yes  How would you like to obtain your AVS? MyChart  If you are dropped from the video visit, the video invite should be resent to:   Will anyone else be joining your video visit? No      Subjective     Yady Sellers is a 19 year old female who presents today via video visit for the following health issues:    HPI    Medication Followup of Lexapro    Taking Medication as prescribed: yes    Side Effects:  None    Medication Helping Symptoms:  yes   Follow up on anxiety & depression   Has been on lexapro  10 mg once daily- since 4 weeks.  Feels better. Therapist has asked her to be ready for more stress as school opens and discuss the dose.  First class on September 2nd-  has decided to continue with distant learning at home with parents in [place]  instead to be at the college in [place].  Does feel sad about missing friends.  Does feel safe at home.      Video Start Time: 12:04 PM        Review of Systems   Constitutional, HEENT, cardiovascular, pulmonary, GI, , musculoskeletal, " neuro, skin, endocrine and psych systems are negative, except as otherwise noted.      Objective           Vitals:  No vitals were obtained today due to virtual visit.    Physical Exam     GENERAL: Healthy, alert and no distress  EYES: Eyes grossly normal to inspection.  No discharge or erythema, or obvious scleral/conjunctival abnormalities.  RESP: No audible wheeze, cough, or visible cyanosis.  No visible retractions or increased work of breathing.    SKIN: Visible skin clear. No significant rash, abnormal pigmentation or lesions.  NEURO: Cranial nerves grossly intact.  Mentation and speech appropriate for age.  PSYCH: Mentation appears normal, affect normal/bright, judgement and insight intact, normal speech and appearance well-groomed.              Assessment & Plan     Adjustment disorder with mixed anxiety and depressed mood  18 yo college student- has been on lexapro for 4 weeks  Overall doing better- no side effects from medications .  For now continue same dose- refill is given   Advised to continue with counseling and excercise.  In anticipation of possible increase in anxiety- if feels more anxious ok to increase the dose to 20 mg on own and call for follow up- to get refill earlier- and to discuss mood and medications   - escitalopram (LEXAPRO) 10 MG tablet  Dispense: 90 tablet; Refill: 1             Return in about 3 months (around 11/26/2020) for mood, virtual/video visit.    Adriana Jimenez MD  [facility]      Video-Visit Details    Type of service:  Video Visit    Video End Time:12:15 PM    Originating Location (pt. Location): Home    Distant Location (provider location):  [facility]     Platform used for Video Visit: Carol AnnWell

## 2020-08-27 ENCOUNTER — VIRTUAL VISIT (OUTPATIENT)
Dept: PSYCHOLOGY | Facility: CLINIC | Age: 20
End: 2020-08-27
Payer: COMMERCIAL

## 2020-08-27 DIAGNOSIS — F32.1 MAJOR DEPRESSIVE DISORDER, SINGLE EPISODE, MODERATE (H): ICD-10-CM

## 2020-08-27 DIAGNOSIS — F41.1 GAD (GENERALIZED ANXIETY DISORDER): Primary | ICD-10-CM

## 2020-08-27 PROCEDURE — 90837 PSYTX W PT 60 MINUTES: CPT | Mod: GT | Performed by: SOCIAL WORKER

## 2020-08-27 PROCEDURE — 99207 ZZC CDG-CODE INCORRECT PER BILLING BASED ON TIME: CPT | Performed by: SOCIAL WORKER

## 2020-08-27 ASSESSMENT — ANXIETY QUESTIONNAIRES: GAD7 TOTAL SCORE: 2

## 2020-08-27 NOTE — LETTER
Some increased emotions as friends return to college, and she is string home for first semester

## 2020-08-28 NOTE — PROGRESS NOTES
Progress Note    Patient Name: Yady Sellers  Date: 8/27/2020         Service Type: Individual      Session Start Time: 11:05AM  Session End Time: 12:00PM     Session Length: 55 minutes    Session #:4    Attendees: Client    Service Modality:  Video Visit:    Telemedicine Visit: The patient's condition can be safely assessed and treated via synchronous audio and visual telemedicine encounter.      Reason for Telemedicine Visit: COVID 19 restricitions    Originating Site (Patient Location): Patient's home    Distant Site (Provider Location): [facility] [place]    Consent:  The patient/guardian has verbally consented to: the potential risks and benefits of telemedicine (video visit) versus in person care; bill my insurance or make self-payment for services provided; and responsibility for payment of non-covered services.     Patient would like the video invitation sent by: Text to cell phone: [phone]}     Mode of Communication:  Video Conference via Amwell    As the provider I attest to compliance with applicable laws and regulations related to telemedicine.     Treatment Plan Last Reviewed: 8/5/2020  PHQ-9 / ROLLY-7 : 8/19/2020    DATA  Interactive Complexity: No  Crisis: No       Progress Since Last Session (Related to Symptoms / Goals / Homework):   Symptoms: Improving morelevel emtopjns, but missing being at college and with friends    Homework: Achieved / completed to satisfaction reaching out to friends      Episode of Care Goals: Satisfactory progress - PREPARATION (Decided to change - considering how); Intervened by negotiating a change plan and determining options / strategies for behavior change, identifying triggers, exploring social supports, and working towards setting a date to begin behavior change     Current / Ongoing Stressors and Concerns:   COVID 19 restrictions              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 Is aware of need for solid sleep, and preparing for next day. Has one class scheduled, and will be scheduling the rest this week. Hopeful to get what she wants for classes. Classes are scheduled at weird times, but she feel she can accommodate that. Discussed getting needsvmet around schedule. Has talked with roommates and friend mises them and hasvsome regret about staying home for a semester. Discussed choices, and how to deal with emotions, Will schedule regular social contacts. Has talked with PCP about increasing medication if needed        ASSESSMENT: Current Emotional / Mental Status (status of significant symptoms):   Risk status (Self / Other harm or suicidal ideation)   Patient denies current fears or concerns for personal safety.   Patient denies current or recent suicidal ideation or behaviors.   Patient denies current or recent homicidal ideation or behaviors.    No changes reported   Patient denies current or recent self injurious behavior or ideation.   Patient denies other safety concerns.   Patient reports there has been no change in risk factors since their last session.     Patient reports there has been no change in protective factors since their last session.     Recommended that patient call 911 or go to the local ED should there be a change in any of these risk factors.     Appearance:   Appropriate    Eye Contact:   Good    Psychomotor Behavior: unable to assess due to video session    Attitude:   Cooperative  sad    Orientation:   All   Speech    Rate /  Production: Normal     Volume:  Normal    Mood:    sad calm   Affect:    sad calm    Thought Content:  Clear    Thought Form:  Coherent  Logical    Insight:    Good      Medication Review:   No changes to current psychiatric medication(s)discussed increasing medication if increased symptoms with PCP     Medication Compliance:   Yes     Changes in Health Issues:   None reported     Chemical Use Review:   Substance Use: Chemical use reviewed, no active concerns identified      Tobacco Use: No current tobacco use.      Diagnosis:  1. ROLLY (generalized anxiety disorder)    2. Major depressive disorder, single episode, moderate (H)        Collateral Reports Completed:   Routed note to PCP    PLAN: (Patient Tasks / Therapist Tasks / Other)  Prepare schedule foron line college classes  Schedule social contacts  Continue activity, meditation, yoga and regular sleep  Will return in 2  weeks        LOREN Segovia LM                                                         ______________________________________________________________________    Treatment Plan    Patient's Name: Yady Sellers  YOB: 2000    Date: 8/5/2020    DSM5 Diagnoses: 296.22 (F32.1)  Major Depressive Disorder, Single Episode, Moderate _ and With mixed features or 300.02 (F41.1) Generalized Anxiety Disorder  Psychosocial / Contextual Factors: COVID 19 restrictions                                                                Planning on line classes from home for Fall due to COVID 19                                                                Limited social contacts                                                                Sister with history of significant depression and anxiety                                                                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    Anticipated number of session or this episode of care: will review in 90 days      MeasurableTreatment Goal(s) related to diagnosis / functional impairment(s)  Goal 1: Patient will decrease anxiety symptoms    I will know I've met my goal when I can manage my symptoms.      Objective #A (Patient Action)    Patient will identify 2 fears / thoughts that contribute to feeling anxious.  Status: New - Date: 8/5/2020     Intervention(s)  Therapist will teach emotional recognition/identification. skills.    Objective #B  Patient will use at least 2 coping skills for anxiety management in the next few weeks.  Status: New - Date: 8/5/2020     Intervention(s)  Therapist will teach emotional regulation skills. for anxiety.    Objective #C  Patient will identify three distraction and diversion activities and use those activities to decrease level of anxiety  .  Status: New - Date: 8/5/2020     Intervention(s)  Therapist will teach distraction skills. and diversion skills.      Goal 2: Patient will build skills to manage anxiety reactions    I will know I've met my goal when I have skills to manage my anxiety and feel more resilient.      Objective #A (Patient Action)    Status: New - Date: 8/5/2020     Patient will identify at least 2 triggers for anxiety.    Intervention(s)  Therapist will teach emotional recognition/identification. for triggers.    Objective #B  Patient will use thought-stopping strategy daily to reduce intrusive thoughts.    Status: New - Date: 8/5/2020     Intervention(s)  Therapist will teach Cognitive Behavioral Skills.    Objective #C  Patient will use cognitive strategies identified in therapy to challenge anxious thoughts.  Status: New - Date: 8/5/2020     Intervention(s)  Therapist will teach Cognitive Behavioral Skills.      Patient  mailed treatment plan for review and signature .      Elle Gonzalez [facility]  August 27, 2020

## 2020-09-09 ENCOUNTER — VIRTUAL VISIT (OUTPATIENT)
Dept: PSYCHOLOGY | Facility: CLINIC | Age: 20
End: 2020-09-09
Payer: COMMERCIAL

## 2020-09-09 DIAGNOSIS — F32.1 MAJOR DEPRESSIVE DISORDER, SINGLE EPISODE, MODERATE (H): ICD-10-CM

## 2020-09-09 DIAGNOSIS — F41.1 GAD (GENERALIZED ANXIETY DISORDER): Primary | ICD-10-CM

## 2020-09-09 PROCEDURE — 90834 PSYTX W PT 45 MINUTES: CPT | Mod: 95 | Performed by: SOCIAL WORKER

## 2020-09-09 ASSESSMENT — ANXIETY QUESTIONNAIRES
2. NOT BEING ABLE TO STOP OR CONTROL WORRYING: NOT AT ALL
6. BECOMING EASILY ANNOYED OR IRRITABLE: NOT AT ALL
5. BEING SO RESTLESS THAT IT IS HARD TO SIT STILL: NOT AT ALL
GAD7 TOTAL SCORE: 1
4. TROUBLE RELAXING: SEVERAL DAYS
1. FEELING NERVOUS, ANXIOUS, OR ON EDGE: NOT AT ALL
3. WORRYING TOO MUCH ABOUT DIFFERENT THINGS: NOT AT ALL
7. FEELING AFRAID AS IF SOMETHING AWFUL MIGHT HAPPEN: NOT AT ALL

## 2020-09-09 ASSESSMENT — PATIENT HEALTH QUESTIONNAIRE - PHQ9: SUM OF ALL RESPONSES TO PHQ QUESTIONS 1-9: 3

## 2020-09-09 NOTE — PROGRESS NOTES
Progress Note    Patient Name: Yady Sellers  Date: 9/9/2020         Service Type: Individual      Session Start Time: 3:09PM  Session End Time: 3:55PM     Session Length: 55 minutes    Session # :  5   Late start due to connection issues. Client needed to end early due to class    Attendees: Client    Service Modality:  Video Visit:    Telemedicine Visit: The patient's condition can be safely assessed and treated via synchronous audio and visual telemedicine encounter.      Reason for Telemedicine Visit: COVID 19 restricitions    Originating Site (Patient Location): Patient's home    Distant Site (Provider Location): [facility] [place]    Consent:  The patient/guardian has verbally consented to: the potential risks and benefits of telemedicine (video visit) versus in person care; bill my insurance or make self-payment for services provided; and responsibility for payment of non-covered services.     Patient would like the video invitation sent by: Text to cell phone: [phone]}     Mode of Communication:  Video Conference via Amwell    As the provider I attest to compliance with applicable laws and regulations related to telemedicine.     Treatment Plan Last Reviewed: 8/5/2020  PHQ-9 / ROLLY-7 : 9/9/2020    DATA  Interactive Complexity: No  Crisis: No       Progress Since Last Session (Related to Symptoms / Goals / Homework):   Symptoms: Worsening client reports increased anxiety with fears about her mortality    Homework: Achieved / completed to satisfaction reaching out to friends      Episode of Care Goals: Satisfactory progress - PREPARATION (Decided to change - considering how); Intervened by negotiating a change plan and determining options / strategies for behavior change, identifying triggers, exploring social supports, and working towards setting a date to begin behavior change     Current / Ongoing Stressors and Concerns:   COVID 19  restrictions              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 Did not get all the classes she wanted, but is liking her classes. So far the load is light, but she anticipates a heavier load as the semester is progressing. Making plans for senior year living arrangements. Helps to look forward. Had a few days of high anxiety. With COVID mortality and death of her friend, has been questioning her mortality and whether she should make a different life plan. Focused on some cognitive techniques        ASSESSMENT: Current Emotional / Mental Status (status of significant symptoms):   Risk status (Self / Other harm or suicidal ideation)   Patient denies current fears or concerns for personal safety.   Patient denies current or recent suicidal ideation or behaviors.   Patient denies current or recent homicidal ideation or behaviors.    No changes reported   Patient denies current or recent self injurious behavior or ideation.   Patient denies other safety concerns.   Patient reports there has been no change in risk factors since their last session.     Patient reports there has been no change in protective factors since their last session.     Recommended that patient call 911 or go to the local ED should there be a change in any of these risk factors.     Appearance:   Appropriate    Eye Contact:   Good    Psychomotor Behavior: unable to assess due to video session    Attitude:   Cooperative  sad anxious   Orientation:   All   Speech    Rate / Production: Normal     Volume:  Normal    Mood:    Anxious   sad    Affect:    sad anxious    Thought Content:  Clear    Thought Form:  Coherent  Logical    Insight:    Good      Medication Review:   No changes to current psychiatric medication(s)discussed increasing medication if increased symptoms with PCP     Medication Compliance:   Yes     Changes in Health Issues:   None reported     Chemical Use Review:   Substance Use: Chemical use reviewed, no active concerns identified      Tobacco Use: No current tobacco use.      Diagnosis:  1. ROLLY (generalized anxiety disorder)    2. Major depressive disorder, single episode, moderate (H)        Collateral Reports Completed:   Routed note to PCP    PLAN: (Patient Tasks / Therapist Tasks / Other)  Cognitive skills and distraction for anxious thoughts  Continue activity, meditation, yoga and regular sleep  Will return in 2  weeks        LOREN Segovia LMFT                                                         ______________________________________________________________________    Treatment Plan    Patient's Name: Yady Sellers  YOB: 2000    Date: 8/5/2020    DSM5 Diagnoses: 296.22 (F32.1)  Major Depressive Disorder, Single Episode, Moderate _ and With mixed features or 300.02 (F41.1) Generalized Anxiety Disorder  Psychosocial / Contextual Factors: COVID 19 restrictions                                                                Planning on line classes from home for Fall due to COVID 19                                                                Limited social contacts                                                                Sister with history of significant depression and anxiety                                                                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    Anticipated number of session or this episode of care: will review  in 90 days      MeasurableTreatment Goal(s) related to diagnosis / functional impairment(s)  Goal 1: Patient will decrease anxiety symptoms    I will know I've met my goal when I can manage my symptoms.      Objective #A (Patient Action)    Patient will identify 2 fears / thoughts that contribute to feeling anxious.  Status: New - Date: 8/5/2020     Intervention(s)  Therapist will teach emotional recognition/identification. skills.    Objective #B  Patient will use at least 2 coping skills for anxiety management in the next few weeks.  Status: New - Date: 8/5/2020     Intervention(s)  Therapist will teach emotional regulation skills. for anxiety.    Objective #C  Patient will identify three distraction and diversion activities and use those activities to decrease level of anxiety  .  Status: New - Date: 8/5/2020     Intervention(s)  Therapist will teach distraction skills. and diversion skills.      Goal 2: Patient will build skills to manage anxiety reactions    I will know I've met my goal when I have skills to manage my anxiety and feel more resilient.      Objective #A (Patient Action)    Status: New - Date: 8/5/2020     Patient will identify at least 2 triggers for anxiety.    Intervention(s)  Therapist will teach emotional recognition/identification. for triggers.    Objective #B  Patient will use thought-stopping strategy daily to reduce intrusive thoughts.    Status: New - Date: 8/5/2020     Intervention(s)  Therapist will teach Cognitive Behavioral Skills.    Objective #C  Patient will use cognitive strategies identified in therapy to challenge anxious thoughts.  Status: New - Date: 8/5/2020     Intervention(s)  Therapist will teach Cognitive Behavioral Skills.      Patient  mailed treatment plan for review and signature .      Elle Gonzalez, [facility]  September 9, 2020

## 2020-09-09 NOTE — LETTER
Client starting on line college classes from home. Missing her friends. Making living plans for next yea. Had a few days with anxiety about her mortality. Triggered by COVID and the death of her friend.

## 2020-09-10 ASSESSMENT — ANXIETY QUESTIONNAIRES: GAD7 TOTAL SCORE: 1

## 2020-09-21 ENCOUNTER — VIRTUAL VISIT (OUTPATIENT)
Dept: PSYCHOLOGY | Facility: CLINIC | Age: 20
End: 2020-09-21
Payer: COMMERCIAL

## 2020-09-21 DIAGNOSIS — F32.1 MAJOR DEPRESSIVE DISORDER, SINGLE EPISODE, MODERATE (H): ICD-10-CM

## 2020-09-21 DIAGNOSIS — F41.1 GAD (GENERALIZED ANXIETY DISORDER): Primary | ICD-10-CM

## 2020-09-21 PROCEDURE — 99207 ZZC CDG-CODE INCORRECT PER BILLING BASED ON TIME: CPT | Performed by: SOCIAL WORKER

## 2020-09-21 PROCEDURE — 90837 PSYTX W PT 60 MINUTES: CPT | Mod: 95 | Performed by: SOCIAL WORKER

## 2020-09-21 ASSESSMENT — ANXIETY QUESTIONNAIRES
2. NOT BEING ABLE TO STOP OR CONTROL WORRYING: NOT AT ALL
GAD7 TOTAL SCORE: 3
4. TROUBLE RELAXING: SEVERAL DAYS
3. WORRYING TOO MUCH ABOUT DIFFERENT THINGS: SEVERAL DAYS
1. FEELING NERVOUS, ANXIOUS, OR ON EDGE: SEVERAL DAYS
7. FEELING AFRAID AS IF SOMETHING AWFUL MIGHT HAPPEN: NOT AT ALL
6. BECOMING EASILY ANNOYED OR IRRITABLE: NOT AT ALL
5. BEING SO RESTLESS THAT IT IS HARD TO SIT STILL: NOT AT ALL

## 2020-09-21 ASSESSMENT — PATIENT HEALTH QUESTIONNAIRE - PHQ9: SUM OF ALL RESPONSES TO PHQ QUESTIONS 1-9: 6

## 2020-09-21 NOTE — LETTER
Increased anxiety. Struggles with focus on college classes. Made a plan. Client encouraged to re contact PCP if symptoms do not decrease to discuss medication increase.

## 2020-09-21 NOTE — PROGRESS NOTES
Progress Note    Patient Name: Yady Sellers  Date: 9/21/2020         Service Type: Individual      Session Start Time: 2:05PM  Session End Time: 3:00PM     Session Length: 55 minutes    Session # 6    Attendees: Client    Service Modality:  Video Visit:    Telemedicine Visit: The patient's condition can be safely assessed and treated via synchronous audio and visual telemedicine encounter.      Reason for Telemedicine Visit: COVID 19 restricitions    Originating Site (Patient Location): Patient's home    Distant Site (Provider Location): [facility] [place]    Consent:  The patient/guardian has verbally consented to: the potential risks and benefits of telemedicine (video visit) versus in person care; bill my insurance or make self-payment for services provided; and responsibility for payment of non-covered services.     Patient would like the video invitation sent by: Text to cell phone: [phone]}     Mode of Communication:  Video Conference via Amwell    As the provider I attest to compliance with applicable laws and regulations related to telemedicine.     Treatment Plan Last Reviewed: 8/5/2020  PHQ-9 / ROLLY-7 : 9/21/2020    DATA  Interactive Complexity: No  Crisis: No       Progress Since Last Session (Related to Symptoms / Goals / Homework):   Symptoms: Worsening client reports increased anxiety and depression as is struggling with concentration and work completion    Homework: Partially completed working on school schedule       Episode of Care Goals: Satisfactory progress - PREPARATION (Decided to change - considering how); Intervened by negotiating a change plan and determining options / strategies for behavior change, identifying triggers, exploring social supports, and working towards setting a date to begin behavior change     Current / Ongoing Stressors and Concerns:   COVID 19 restrictions              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 Getting to bed late, and not sleeping enough. Off schedule, not using outlets of yoga and journaling as had before. Behind in classes, struggling with concentration. Has plan to shift to pass /fail in a class if things do not improve. Discussed intentional breaks, and filling time with activities that rejuvenate and regulate her. She has thought about talking with the school about accommodations. Therapist recommended reconnecting with PCP about a medication increase if plan does not decrease symptoms. Client denies negative self talk but acknowledges struggling with doing things for herself that are healthy. Is struggling with finding happiness.        ASSESSMENT: Current Emotional / Mental Status (status of significant symptoms):   Risk status (Self / Other harm or suicidal ideation)   Patient denies current fears or concerns for personal safety.   Patient denies current or recent suicidal ideation or behaviors.   Patient denies current or recent homicidal ideation or behaviors.    No changes reported   Patient denies current or recent self injurious behavior or ideation.   Patient denies other safety concerns.   Patient reports there has been no change in risk factors since their last session.     Patient reports there has been no change in protective factors since their last session.     Recommended that patient call 911 or go to the local ED should there be a change in any of these risk factors.     Appearance:   Appropriate    Eye Contact:   Good    Psychomotor  Behavior: unable to assess due to video session    Attitude:   sad tired anxious   Orientation:   All   Speech    Rate / Production: Normal     Volume:  Normal    Mood:    Anxious  sad tired   Affect:    sad anxious tired   Thought Content:  Clear    Thought Form:  Coherent  Logical    Insight:    Good      Medication Review:   No changes to current psychiatric medication(s)discussed increasing medication if increased symptoms with PCP     Medication Compliance:   Yes     Changes in Health Issues:   None reported     Chemical Use Review:   Substance Use: Chemical use reviewed, no active concerns identified      Tobacco Use: No current tobacco use.      Diagnosis:  1. ROLLY (generalized anxiety disorder)    2. Major depressive disorder, single episode, moderate (H)        Collateral Reports Completed:   Routed note to PCP    PLAN: (Patient Tasks / Therapist Tasks / Other)  New plan for intentional breaks  Plans to contact school for accommodations, or shifting a class to pass/fail if needed  Continue activity, meditation, yoga and regular sleep  Reach out to PCP to discuss medication increase if symptoms do not decrease  Therapist will contact PCP  Will return in 2  weeks        LOREN Segovia LM                                                         ______________________________________________________________________    Treatment Plan    Patient's Name: Yady Sellers  YOB: 2000    Date: 8/5/2020    DSM5 Diagnoses: 296.22 (F32.1)  Major Depressive Disorder, Single Episode, Moderate _ and With mixed features or 300.02 (F41.1) Generalized Anxiety Disorder  Psychosocial / Contextual Factors: COVID 19 restrictions                                                                Planning on line classes from home for Fall due to COVID 19                                                                Limited social contacts                                                                Sister with  history of significant depression and anxiety                                                                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    Anticipated number of session or this episode of care: will review in 90 days      MeasurableTreatment Goal(s) related to diagnosis / functional impairment(s)  Goal 1: Patient will decrease anxiety symptoms    I will know I've met my goal when I can manage my symptoms.      Objective #A (Patient Action)    Patient will identify 2 fears / thoughts that contribute to feeling anxious.  Status: New - Date: 8/5/2020     Intervention(s)  Therapist will teach emotional recognition/identification. skills.    Objective #B  Patient will use at least 2 coping skills for anxiety management in the next few weeks.  Status: New - Date: 8/5/2020     Intervention(s)  Therapist will teach emotional regulation skills. for anxiety.    Objective #C  Patient will identify three distraction and diversion activities and use those activities to decrease level of anxiety  .  Status: New - Date: 8/5/2020     Intervention(s)  Therapist will teach distraction skills. and diversion skills.      Goal 2: Patient will build skills to manage anxiety reactions    I will know I've met my goal when I have skills to manage my anxiety and feel more resilient.      Objective #A (Patient Action)    Status: New - Date: 8/5/2020     Patient will identify at least 2 triggers for anxiety.    Intervention(s)  Therapist will teach emotional recognition/identification. for triggers.    Objective #B  Patient will use thought-stopping strategy daily to reduce intrusive thoughts.    Status: New - Date: 8/5/2020     Intervention(s)  Therapist will teach Cognitive Behavioral Skills.    Objective #C  Patient will use cognitive strategies identified in therapy to challenge  anxious thoughts.  Status: New - Date: 8/5/2020     Intervention(s)  Therapist will teach Cognitive Behavioral Skills.      Patient  mailed treatment plan for review and signature .      Elle Gonzalez, LICSW LMFT  September 21, 2020

## 2020-09-22 ASSESSMENT — ANXIETY QUESTIONNAIRES: GAD7 TOTAL SCORE: 3

## 2020-10-06 ENCOUNTER — VIRTUAL VISIT (OUTPATIENT)
Dept: PSYCHOLOGY | Facility: CLINIC | Age: 20
End: 2020-10-06
Payer: COMMERCIAL

## 2020-10-06 DIAGNOSIS — F41.1 GAD (GENERALIZED ANXIETY DISORDER): Primary | ICD-10-CM

## 2020-10-06 DIAGNOSIS — F32.1 MAJOR DEPRESSIVE DISORDER, SINGLE EPISODE, MODERATE (H): ICD-10-CM

## 2020-10-06 PROCEDURE — 99207 PR CDG-CODE INCORRECT PER BILLING BASED ON TIME: CPT | Performed by: SOCIAL WORKER

## 2020-10-06 PROCEDURE — 90837 PSYTX W PT 60 MINUTES: CPT | Mod: 95 | Performed by: SOCIAL WORKER

## 2020-10-06 ASSESSMENT — ANXIETY QUESTIONNAIRES
6. BECOMING EASILY ANNOYED OR IRRITABLE: NOT AT ALL
GAD7 TOTAL SCORE: 6
3. WORRYING TOO MUCH ABOUT DIFFERENT THINGS: SEVERAL DAYS
7. FEELING AFRAID AS IF SOMETHING AWFUL MIGHT HAPPEN: NOT AT ALL
IF YOU CHECKED OFF ANY PROBLEMS ON THIS QUESTIONNAIRE, HOW DIFFICULT HAVE THESE PROBLEMS MADE IT FOR YOU TO DO YOUR WORK, TAKE CARE OF THINGS AT HOME, OR GET ALONG WITH OTHER PEOPLE: VERY DIFFICULT
5. BEING SO RESTLESS THAT IT IS HARD TO SIT STILL: NOT AT ALL
1. FEELING NERVOUS, ANXIOUS, OR ON EDGE: MORE THAN HALF THE DAYS
2. NOT BEING ABLE TO STOP OR CONTROL WORRYING: SEVERAL DAYS
4. TROUBLE RELAXING: MORE THAN HALF THE DAYS

## 2020-10-06 ASSESSMENT — PATIENT HEALTH QUESTIONNAIRE - PHQ9: SUM OF ALL RESPONSES TO PHQ QUESTIONS 1-9: 10

## 2020-10-07 ENCOUNTER — TELEPHONE (OUTPATIENT)
Dept: FAMILY MEDICINE | Facility: CLINIC | Age: 20
End: 2020-10-07

## 2020-10-07 ASSESSMENT — ANXIETY QUESTIONNAIRES: GAD7 TOTAL SCORE: 6

## 2020-10-07 NOTE — PROGRESS NOTES
Progress Note    Patient Name: Yady Sellers  Date: 10/6/2020         Service Type: Individual      Session Start Time: 5:05PM  Session End Time: 6:00PM     Session Length: 55 minutes    Session # 7    Attendees: Client    Service Modality:  Video Visit:    Telemedicine Visit: The patient's condition can be safely assessed and treated via synchronous audio and visual telemedicine encounter.      Reason for Telemedicine Visit: COVID 19 restricitions    Originating Site (Patient Location): Patient's home    Distant Site (Provider Location): [facility] [place]    Consent:  The patient/guardian has verbally consented to: the potential risks and benefits of telemedicine (video visit) versus in person care; bill my insurance or make self-payment for services provided; and responsibility for payment of non-covered services.     Patient would like the video invitation sent by: Text to cell phone: [phone]}     Mode of Communication:  Video Conference via Amwell    As the provider I attest to compliance with applicable laws and regulations related to telemedicine.     Treatment Plan Last Reviewed: 8/5/2020  PHQ-9 / ROLLY-7 : 10/6/2020    DATA  Interactive Complexity: No  Crisis: No       Progress Since Last Session (Related to Symptoms / Goals / Homework):   Symptoms: Worsening client reports increased anxiety and depression as increased problems with work completion    Homework: Partially completed working on school work       Episode of Care Goals: Satisfactory progress - PREPARATION (Decided to change - considering how); Intervened by negotiating a change plan and determining options / strategies for behavior change, identifying triggers, exploring social supports, and working towards setting a date to begin behavior change     Current / Ongoing Stressors and Concerns:   COVID 19 restrictions              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 Reports having block with a Urdu paper, and getting behind in all of her classes. She did reach out to professors, and handed it in late. Caught up this weekend with work. Now is behind again. Again discussed reaching out to PCP about a medication increase. Client agreed. Plan to move forward with other work  if gets stuck again. Looked at other techniques: breaks, yoga. Fun time with family or friends and extra sleep. Client did drop a class. Discussed accommodations. Feels could do this with most classes. Is feeling some shame at being so stuck, fearful about future. Focused on making one plan at a time, with plan to shift each week if plan is not successful        ASSESSMENT: Current Emotional / Mental Status (status of significant symptoms):   Risk status (Self / Other harm or suicidal ideation)   Patient denies current fears or concerns for personal safety.   Patient denies current or recent suicidal ideation or behaviors.   Patient denies current or recent homicidal ideation or behaviors.    No changes reported   Patient denies current or recent self injurious behavior or ideation.   Patient denies other safety concerns.   Patient reports there has been no change in risk factors since their last session.     Patient reports there has been no change in protective factors since their last session.     Recommended that patient call 911 or go to the local ED should there be a change in any of these risk factors.     Appearance:   Appropriate    Eye Contact:   Good    Psychomotor  Behavior: unable to assess due to video session    Attitude:   sad tired anxious frightened   Orientation:   All   Speech    Rate / Production: Normal     Volume:  Normal    Mood:    Anxious  sad tired frightened   Affect:    sad anxious tired weepy   Thought Content:  Clear    Thought Form:  Coherent  Logical    Insight:    Good      Medication Review:   No changes to current psychiatric medication(s)discussed increasing medication due to increased symptoms with PCP     Medication Compliance:   Yes     Changes in Health Issues:   None reported     Chemical Use Review:   Substance Use: Chemical use reviewed, no active concerns identified      Tobacco Use: No current tobacco use.      Diagnosis:  1. ROLLY (generalized anxiety disorder)    2. Major depressive disorder, single episode, moderate (H)        Collateral Reports Completed:   Routed note to PCP    PLAN: (Patient Tasks / Therapist Tasks / Other)   plan for intentional breaks, time away, and rest  Plans to contact school for accommodations  Reach out to PCP to discuss medication increase   Therapist will contact PCP  Will return in 2  Weeks. On cancel list for next week        LOREN Segovia LMFT                                                         ______________________________________________________________________    Treatment Plan    Patient's Name: Yady Sellers  YOB: 2000    Date: 8/5/2020    DSM5 Diagnoses: 296.22 (F32.1)  Major Depressive Disorder, Single Episode, Moderate _ and With mixed features or 300.02 (F41.1) Generalized Anxiety Disorder  Psychosocial / Contextual Factors: COVID 19 restrictions                                                                Planning on line classes from home for Fall due to COVID 19                                                                Limited social contacts                                                                Sister with history of significant depression and anxiety                                                                 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    Anticipated number of session or this episode of care: will review in 90 days      MeasurableTreatment Goal(s) related to diagnosis / functional impairment(s)  Goal 1: Patient will decrease anxiety symptoms    I will know I've met my goal when I can manage my symptoms.      Objective #A (Patient Action)    Patient will identify 2 fears / thoughts that contribute to feeling anxious.  Status: New - Date: 8/5/2020     Intervention(s)  Therapist will teach emotional recognition/identification. skills.    Objective #B  Patient will use at least 2 coping skills for anxiety management in the next few weeks.  Status: New - Date: 8/5/2020     Intervention(s)  Therapist will teach emotional regulation skills. for anxiety.    Objective #C  Patient will identify three distraction and diversion activities and use those activities to decrease level of anxiety  .  Status: New - Date: 8/5/2020     Intervention(s)  Therapist will teach distraction skills. and diversion skills.      Goal 2: Patient will build skills to manage anxiety reactions    I will know I've met my goal when I have skills to manage my anxiety and feel more resilient.      Objective #A (Patient Action)    Status: New - Date: 8/5/2020     Patient will identify at least 2 triggers for anxiety.    Intervention(s)  Therapist will teach emotional recognition/identification. for triggers.    Objective #B  Patient will use thought-stopping strategy daily to reduce intrusive thoughts.    Status: New - Date: 8/5/2020     Intervention(s)  Therapist will teach Cognitive Behavioral Skills.    Objective #C  Patient will use cognitive strategies identified in therapy to challenge anxious thoughts.  Status: New - Date: 8/5/2020      Intervention(s)  Therapist will teach Cognitive Behavioral Skills.      Patient  mailed treatment plan for review and signature .      Elle Gonzalez, [facility]  October 6, 2020

## 2020-10-07 NOTE — TELEPHONE ENCOUNTER
----- Message from Adriana Jimenez MD sent at 10/7/2020  8:38 AM CDT -----  Regarding: call her for video appointment please.  Thanks for the update- I saw her in august - so our triage or team will call to help her with virtual appointment.  Thanks   ----- Message -----  From: Elle Gonzalez, [facility]  Sent: 10/6/2020   6:17 PM CDT  To: Adriana Jimenez MD    Session. Client very tearful. Is an A student, doing online college. Overwhelmed, struggling with concentration and work completion and spinning thoughts.  Encouraged client to get on your schedule to discuss a medication increase or shift. When symptom were lower, she felt some symptom assistance, but not now.    Thanks you for your help.  Elle

## 2020-10-12 NOTE — TELEPHONE ENCOUNTER
Left non detailed VM for pt asking that they callback and schedule virtual visit  See Thiagot from 10/10/2020 also  Therese GUNN RN

## 2020-10-15 ENCOUNTER — VIRTUAL VISIT (OUTPATIENT)
Dept: PSYCHOLOGY | Facility: CLINIC | Age: 20
End: 2020-10-15
Payer: COMMERCIAL

## 2020-10-15 DIAGNOSIS — F41.1 GAD (GENERALIZED ANXIETY DISORDER): Primary | ICD-10-CM

## 2020-10-15 DIAGNOSIS — F32.1 MAJOR DEPRESSIVE DISORDER, SINGLE EPISODE, MODERATE (H): ICD-10-CM

## 2020-10-15 PROCEDURE — 90837 PSYTX W PT 60 MINUTES: CPT | Mod: 95 | Performed by: SOCIAL WORKER

## 2020-10-15 NOTE — PROGRESS NOTES
Progress Note    Patient Name: Yady Sellers  Date: 10/15/2020         Service Type: Individual      Session Start Time: 9:05AM  Session End Time: 10:00AM     Session Length: 55 minutes    Session # 8    Attendees: Client    Service Modality:  Video Visit:    Telemedicine Visit: The patient's condition can be safely assessed and treated via synchronous audio and visual telemedicine encounter.      Reason for Telemedicine Visit: COVID 19 restricitions    Originating Site (Patient Location): Patient's home    Distant Site (Provider Location): [facility] [place]    Consent:  The patient/guardian has verbally consented to: the potential risks and benefits of telemedicine (video visit) versus in person care; bill my insurance or make self-payment for services provided; and responsibility for payment of non-covered services.     Patient would like the video invitation sent by: Text to cell phone: [phone]}     Mode of Communication:  Video Conference via Amwell    As the provider I attest to compliance with applicable laws and regulations related to telemedicine.     Treatment Plan Last Reviewed: 8/5/2020  PHQ-9 / ROLLY-7 : 10/6/2020    DATA  Interactive Complexity: No  Crisis: No       Progress Since Last Session (Related to Symptoms / Goals / Homework):   Symptoms: No change some days client is more prodcutive than others     Homework: Partially completed working on school plan      Episode of Care Goals: Satisfactory progress - PREPARATION (Decided to change - considering how); Intervened by negotiating a change plan and determining options / strategies for behavior change, identifying triggers, exploring social supports, and working towards setting a date to begin behavior change     Current / Ongoing Stressors and Concerns:   COVID 19 restrictions              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 Reports having another week of productivity and immobilization. Client has chosen to apply for some accommodations. Client has some shame about this, but feels it is necessary. Will be sending form to therapist for completion. .Took a day and a half to sing with [employer], hold auditions and welcome new members. Later felt behind and guilty. Another block on a test this time. Conferring shifting class to pass no credit. Starting to label shameful thinking. Struggling with adding past coping mechanisms. Discussed making choices intentionally, so will not have guilty response later.        ASSESSMENT: Current Emotional / Mental Status (status of significant symptoms):   Risk status (Self / Other harm or suicidal ideation)   Patient denies current fears or concerns for personal safety.   Patient denies current or recent suicidal ideation or behaviors.   Patient denies current or recent homicidal ideation or behaviors.    No changes reported   Patient denies current or recent self injurious behavior or ideation.   Patient denies other safety concerns.   Patient reports there has been no change in risk factors since their last session.     Patient reports there has been no change in protective factors since their last session.     Recommended that patient call 911 or go to the local ED should there be a change in any of these risk factors.     Appearance:   Appropriate    Eye Contact:   Fair    Psychomotor Behavior: unable to assess due to video session    Attitude:   sad tired anxious    Orientation:   All   Speech    Rate / Production: Normal      Volume:  Normal    Mood:    Anxious  sad tired    Affect:    sad anxious tired weepy   Thought Content:  Clear    Thought Form:  Coherent  Logical    Insight:    Good      Medication Review:   No changes to current psychiatric medication(s)discussed increasing medication due to increased symptoms with PCP     Medication Compliance:   Yes     Changes in Health Issues:   None reported     Chemical Use Review:   Substance Use: Chemical use reviewed, no active concerns identified      Tobacco Use: No current tobacco use.      Diagnosis:  1. ROLLY (generalized anxiety disorder)    2. Major depressive disorder, single episode, moderate (H)        Collateral Reports Completed:   Routed note to PCP    PLAN: (Patient Tasks / Therapist Tasks / Other)   plan for intentional breaks, time away, and rest  Completing forms to request accomodations  Therapist will complete form upon receipt  Will return in 1week        LOREN Segovia                                                         ______________________________________________________________________    Treatment Plan    Patient's Name: Yady Sellers  YOB: 2000    Date: 8/5/2020    DSM5 Diagnoses: 296.22 (F32.1)  Major Depressive Disorder, Single Episode, Moderate _ and With mixed features or 300.02 (F41.1) Generalized Anxiety Disorder  Psychosocial / Contextual Factors: COVID 19 restrictions                                                                Planning on line classes from home for Fall due to COVID 19                                                                Limited social contacts                                                                Sister with history of significant depression and anxiety                                                                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    Anticipated number of session or this episode of care: will review in 90 days      MeasurableTreatment Goal(s) related to diagnosis / functional impairment(s)  Goal 1: Patient will decrease anxiety symptoms    I will know I've met my goal when I can manage my symptoms.      Objective #A (Patient Action)    Patient will identify 2 fears / thoughts that contribute to feeling anxious.  Status: New - Date: 8/5/2020     Intervention(s)  Therapist will teach emotional recognition/identification. skills.    Objective #B  Patient will use at least 2 coping skills for anxiety management in the next few weeks.  Status: New - Date: 8/5/2020     Intervention(s)  Therapist will teach emotional regulation skills. for anxiety.    Objective #C  Patient will identify three distraction and diversion activities and use those activities to decrease level of anxiety  .  Status: New - Date: 8/5/2020     Intervention(s)  Therapist will teach distraction skills. and diversion skills.      Goal 2: Patient will build skills to manage anxiety reactions    I will know I've met my goal when I have skills to manage my anxiety and feel more resilient.      Objective #A (Patient Action)    Status: New - Date: 8/5/2020     Patient will identify at least 2 triggers for anxiety.    Intervention(s)  Therapist will teach emotional recognition/identification. for triggers.    Objective #B  Patient will use thought-stopping strategy daily to reduce intrusive thoughts.    Status: New - Date: 8/5/2020     Intervention(s)  Therapist will teach Cognitive Behavioral Skills.    Objective #C  Patient will use cognitive strategies identified in therapy to challenge anxious thoughts.  Status: New - Date: 8/5/2020     Intervention(s)  Therapist will teach Cognitive Behavioral Skills.      Patient  mailed treatment plan for review and signature .      Elle Gonzalez, [facility] LM  October 15, 2020

## 2020-10-15 NOTE — LETTER
Continued dips of depression. Several days a week can become immobilized with school work. Is applying for some accommodations at school

## 2020-10-19 ENCOUNTER — VIRTUAL VISIT (OUTPATIENT)
Dept: FAMILY MEDICINE | Facility: CLINIC | Age: 20
End: 2020-10-19
Payer: COMMERCIAL

## 2020-10-19 DIAGNOSIS — J30.81 CHRONIC ALLERGIC RHINITIS DUE TO ANIMAL HAIR AND DANDER: ICD-10-CM

## 2020-10-19 DIAGNOSIS — F43.22 ADJUSTMENT DISORDER WITH ANXIOUS MOOD: Primary | ICD-10-CM

## 2020-10-19 DIAGNOSIS — J45.30 UNCONTROLLED MILD PERSISTENT ASTHMA: ICD-10-CM

## 2020-10-19 PROBLEM — J45.998 ASTHMA, PERSISTENT CONTROLLED: Status: ACTIVE | Noted: 2020-10-19

## 2020-10-19 PROBLEM — F43.23 ADJUSTMENT DISORDER WITH MIXED ANXIETY AND DEPRESSED MOOD: Status: RESOLVED | Noted: 2020-07-07 | Resolved: 2020-10-19

## 2020-10-19 PROCEDURE — 99214 OFFICE O/P EST MOD 30 MIN: CPT | Mod: 95 | Performed by: FAMILY MEDICINE

## 2020-10-19 RX ORDER — ESCITALOPRAM OXALATE 20 MG/1
20 TABLET ORAL DAILY
Qty: 30 TABLET | Refills: 1 | Status: SHIPPED | OUTPATIENT
Start: 2020-10-19 | End: 2020-10-19

## 2020-10-19 RX ORDER — FLUTICASONE PROPIONATE 220 UG/1
1 AEROSOL, METERED RESPIRATORY (INHALATION) 2 TIMES DAILY
Qty: 1 INHALER | Refills: 1 | Status: SHIPPED | OUTPATIENT
Start: 2020-10-19 | End: 2021-02-03

## 2020-10-19 RX ORDER — ALBUTEROL SULFATE 90 UG/1
2 AEROSOL, METERED RESPIRATORY (INHALATION) EVERY 6 HOURS PRN
Qty: 1 INHALER | Refills: 1 | Status: SHIPPED | OUTPATIENT
Start: 2020-10-19 | End: 2021-02-09

## 2020-10-19 RX ORDER — MONTELUKAST SODIUM 10 MG/1
10 TABLET ORAL AT BEDTIME
Qty: 30 TABLET | Refills: 1 | Status: SHIPPED | OUTPATIENT
Start: 2020-10-19 | End: 2021-02-09

## 2020-10-19 RX ORDER — ESCITALOPRAM OXALATE 20 MG/1
20 TABLET ORAL DAILY
Qty: 30 TABLET | Refills: 1 | Status: SHIPPED | OUTPATIENT
Start: 2020-10-19 | End: 2021-02-03

## 2020-10-19 RX ORDER — FLUTICASONE PROPIONATE 220 UG/1
1 AEROSOL, METERED RESPIRATORY (INHALATION) 2 TIMES DAILY
Qty: 1 INHALER | Refills: 1 | Status: SHIPPED | OUTPATIENT
Start: 2020-10-19 | End: 2020-10-19

## 2020-10-19 NOTE — PROGRESS NOTES
"Yady Sellers is a 19 year old female who is being evaluated via a billable video visit.      The patient has been notified of following:     \"This video visit will be conducted via a call between you and your physician/provider. We have found that certain health care needs can be provided without the need for an in-person physical exam.  This service lets us provide the care you need with a video conversation.  If a prescription is necessary we can send it directly to your pharmacy.  If lab work is needed we can place an order for that and you can then stop by our lab to have the test done at a later time.    Video visits are billed at different rates depending on your insurance coverage.  Please reach out to your insurance provider with any questions.    If during the course of the call the physician/provider feels a video visit is not appropriate, you will not be charged for this service.\"    Patient has given verbal consent for Video visit? Yes  How would you like to obtain your AVS? MyChart  If you are dropped from the video visit, the video invite should be resent to:   Will anyone else be joining your video visit? No    Subjective     Yady Sellers is a 19 year old female who presents today via video visit for the following health issues:    HPI     Depression and Anxiety Follow-Up    How are you doing with your depression since your last visit? Worsened     How are you doing with your anxiety since your last visit?  Worsened     Are you having other symptoms that might be associated with depression or anxiety? No    Have you had a significant life event? No     Do you have any concerns with your use of alcohol or other drugs? No     She is at home    Feeling more anxious and depressed    Increased lexapro to 20 , from 10 mg in past 4 days    lexapro has helped but feeling overwhelmed with distant learning/ overwhelmed, avoided finishing some work- one week and then it pilled up and started worsening, and now " trying to finish work last minutes      History of asthma- dust mites & pollen/grasses/mold allergies.  And gets allergy shots and asthma flare up is allergy induced- has seen allergist.  Allergist recommended- rescue inhaler-to use- if more allergies.  And wondering if she can start another inhaler like her friend- on daily basis.  She has been using albuterol every 4-6 hours apart.          Social History     Tobacco Use     Smoking status: Never Smoker     Smokeless tobacco: Never Used   Substance Use Topics     Alcohol use: None     Drug use: None     PHQ 9/9/2020 9/21/2020 10/6/2020   PHQ-9 Total Score 3 6 10   Q9: Thoughts of better off dead/self-harm past 2 weeks Not at all Not at all Not at all     ROLLY-7 SCORE 9/9/2020 9/21/2020 10/6/2020   Total Score - - -   Total Score 1 3 6     Last PHQ-9 10/6/2020   1.  Little interest or pleasure in doing things 2   2.  Feeling down, depressed, or hopeless 1   3.  Trouble falling or staying asleep, or sleeping too much 1   4.  Feeling tired or having little energy 2   5.  Poor appetite or overeating 1   6.  Feeling bad about yourself 1   7.  Trouble concentrating 2   8.  Moving slowly or restless 0   Q9: Thoughts of better off dead/self-harm past 2 weeks 0   PHQ-9 Total Score 10   Difficulty at work, home, or with people Very difficult     ROLLY-7  10/6/2020   1. Feeling nervous, anxious, or on edge 2   2. Not being able to stop or control worrying 1   3. Worrying too much about different things 1   4. Trouble relaxing 2   5. Being so restless that it is hard to sit still 0   6. Becoming easily annoyed or irritable 0   7. Feeling afraid, as if something awful might happen 0   ROLLY-7 Total Score 6   If you checked any problems, how difficult have they made it for you to do your work, take care of things at home, or get along with other people? Very difficult       Suicide Assessment Five-step Evaluation and Treatment (SAFE-T)      How many servings of fruits and vegetables  do you eat daily?  2-3    On average, how many sweetened beverages do you drink each day (Examples: soda, juice, sweet tea, etc.  Do NOT count diet or artificially sweetened beverages)?   0    How many days per week do you exercise enough to make your heart beat faster? 4    How many minutes a day do you exercise enough to make your heart beat faster? 30 - 60  How many days per week do you miss taking your medication? 1    What makes it hard for you to take your medications?  remembering to take               Review of Systems   Constitutional, HEENT, cardiovascular, pulmonary, GI, , musculoskeletal, neuro, skin, endocrine and psych systems are negative, except as otherwise noted.      Objective           Vitals:  No vitals were obtained today due to virtual visit.    Physical Exam     GENERAL: Healthy, alert and no distress  EYES: Eyes grossly normal to inspection.  No discharge or erythema, or obvious scleral/conjunctival abnormalities.  RESP: No audible wheeze, cough, or visible cyanosis.  No visible retractions or increased work of breathing.    SKIN: Visible skin clear. No significant rash, abnormal pigmentation or lesions.  NEURO: Cranial nerves grossly intact.  Mentation and speech appropriate for age.  PSYCH: Mentation appears normal, affect normal/bright, judgement and insight intact, normal speech and appearance well-groomed.  PHQ 9/9/2020 9/21/2020 10/6/2020   PHQ-9 Total Score 3 6 10   Q9: Thoughts of better off dead/self-harm past 2 weeks Not at all Not at all Not at all     ROLLY-7 SCORE 9/9/2020 9/21/2020 10/6/2020   Total Score - - -   Total Score 1 3 6                 Assessment & Plan     Diagnoses and all orders for this visit:    Adjustment disorder with anxious mood  - more depressed and anxious, without suicidal thoughts  Advised to increase the dos of lexapro 20 mg once daily  Continue with counseling.  encouraged excercise- as best as possible  Follow up in 4 weeks,  Earlier if more  concerns      escitalopram (LEXAPRO) 20 MG tablet; Take 1 tablet (20 mg) by mouth daily  Safety plan was reviewed; to the ER as needed or call after hours crisis line; [phone]  Sucide prevention life line [phone]  Community out reach for psych emergencies [phone].  Education and counseling was done regarding use of medications, psychotherapy options.      Uncontrolled mild persistent asthma  Add inhaled steroid- use twice daily  Continue to use bronchodilator/ rescue inhaler as needed - and prior to IS  Follow up in 4 weeks, will complete ACT then- as asthma not controlled  Also add montelucast once daily   Potential medication side effects were discussed with the patient; let me know if any occur.    -     fluticasone (FLOVENT HFA) 220 MCG/ACT inhaler; Inhale 1 puff into the lungs 2 times daily  -     montelukast (SINGULAIR) 10 MG tablet; Take 1 tablet (10 mg) by mouth At Bedtime    Chronic allergic rhinitis due to animal hair and dander  -     albuterol (PROAIR HFA/PROVENTIL HFA/VENTOLIN HFA) 108 (90 Base) MCG/ACT inhaler; Inhale 2 puffs into the lungs every 6 hours as needed for shortness of breath / dyspnea or wheezing  -     montelukast (SINGULAIR) 10 MG tablet; Take 1 tablet (10 mg) by mouth At Bedtime    Other orders  -     Discontinue: escitalopram (LEXAPRO) 20 MG tablet; Take 1 tablet (20 mg) by mouth daily            Regular exercise    Return in about 4 weeks (around 11/16/2020) for concerns,unresolved, virtual/video visit.    Adriana Jimenez MD  [facility]      Video-Visit Details  Video Start Time: 11:03 AM  Video End Time:11:25 AM    Originating Location (pt. Location): Home    Distant Location (provider location):  [facility]     Platform used for Video Visit: Zachary Prell

## 2020-10-20 ENCOUNTER — VIRTUAL VISIT (OUTPATIENT)
Dept: PSYCHOLOGY | Facility: CLINIC | Age: 20
End: 2020-10-20
Payer: COMMERCIAL

## 2020-10-20 DIAGNOSIS — F32.1 MAJOR DEPRESSIVE DISORDER, SINGLE EPISODE, MODERATE (H): ICD-10-CM

## 2020-10-20 DIAGNOSIS — F41.1 GAD (GENERALIZED ANXIETY DISORDER): Primary | ICD-10-CM

## 2020-10-20 PROCEDURE — 90837 PSYTX W PT 60 MINUTES: CPT | Performed by: SOCIAL WORKER

## 2020-10-20 ASSESSMENT — ANXIETY QUESTIONNAIRES
1. FEELING NERVOUS, ANXIOUS, OR ON EDGE: SEVERAL DAYS
4. TROUBLE RELAXING: SEVERAL DAYS
7. FEELING AFRAID AS IF SOMETHING AWFUL MIGHT HAPPEN: NOT AT ALL
3. WORRYING TOO MUCH ABOUT DIFFERENT THINGS: SEVERAL DAYS
5. BEING SO RESTLESS THAT IT IS HARD TO SIT STILL: SEVERAL DAYS
IF YOU CHECKED OFF ANY PROBLEMS ON THIS QUESTIONNAIRE, HOW DIFFICULT HAVE THESE PROBLEMS MADE IT FOR YOU TO DO YOUR WORK, TAKE CARE OF THINGS AT HOME, OR GET ALONG WITH OTHER PEOPLE: SOMEWHAT DIFFICULT
2. NOT BEING ABLE TO STOP OR CONTROL WORRYING: SEVERAL DAYS
6. BECOMING EASILY ANNOYED OR IRRITABLE: NOT AT ALL
GAD7 TOTAL SCORE: 5

## 2020-10-20 ASSESSMENT — PATIENT HEALTH QUESTIONNAIRE - PHQ9: SUM OF ALL RESPONSES TO PHQ QUESTIONS 1-9: 4

## 2020-10-20 NOTE — PROGRESS NOTES
Progress Note    Patient Name: Yady Sellers  Date: 10/20/2020         Service Type: Individual      Session Start Time: 5:05PM  Session End Time: 6:05PM                    Cut off mid sessio. Took 5 minutes to reconnect     Session Length: 55 minutes    Session # 9    Attendees: Client    Service Modality:  Video Visit:    Telemedicine Visit: The patient's condition can be safely assessed and treated via synchronous audio and visual telemedicine encounter.      Reason for Telemedicine Visit: COVID 19 restricitions    Originating Site (Patient Location): Patient's home    Distant Site (Provider Location): [facility] [place]    Consent:  The patient/guardian has verbally consented to: the potential risks and benefits of telemedicine (video visit) versus in person care; bill my insurance or make self-payment for services provided; and responsibility for payment of non-covered services.     Patient would like the video invitation sent by: Text to cell phone: [phone]}     Mode of Communication:  Video Conference via Amwell    As the provider I attest to compliance with applicable laws and regulations related to telemedicine.     Treatment Plan Last Reviewed: 8/5/2020  PHQ-9 / ROLLY-7 : 10/20/2020    DATA  Interactive Complexity: No  Crisis: No       Progress Since Last Session (Related to Symptoms / Goals / Homework):   Symptoms: Improving client still ahs some difficult days, but is feeling more hopeful with changes being made     Homework: Achieved / completed to satisfaction reached out to school to begin accomodation plan       Episode of Care Goals: Satisfactory progress - PREPARATION (Decided to change - considering how); Intervened by negotiating a change plan and determining options / strategies for behavior change, identifying triggers, exploring social supports, and working towards setting a date to begin behavior change     Current /  Ongoing Stressors and Concerns:   COVID 19 restrictions              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 Sent therapist form to complete to apply for accommodations at school, which was sent last Friday (10/16). Client meeting with counselor next week. Discussed what in accomodation plan would be helpful for her. Client met with PCP and increased medication, and is string on different ashame mediation. Client is hopeful to start running again. Client finding it helpful to work on assignments in pieces, and take a break, and come back to assignments. Client trying to add balance: rest, eating, water, family time, excercise and peer time. Client continues to have struggles on some days, but is hopeful this will decresae        ASSESSMENT: Current Emotional / Mental Status (status of significant symptoms):   Risk status (Self / Other harm or suicidal ideation)   Patient denies current fears or concerns for personal safety.   Patient denies current or recent suicidal ideation or behaviors.   Patient denies current or recent homicidal ideation or behaviors.    No changes reported   Patient denies current or recent self injurious behavior or ideation.   Patient denies other safety concerns.   Patient reports there has been no change in risk factors since their last session.     Patient reports there has been no change in protective factors since their last session.     Recommended that patient call 911 or go to the local ED should there be a change in any of these risk factors.     Appearance:   Appropriate    Eye  "Contact:   Fair    Psychomotor Behavior: unable to assess due to video session    Attitude:   Cooperative  Attentive    Orientation:   All   Speech    Rate / Production: Normal     Volume:  Normal    Mood:    tired hopefultired    Affect:    tired hopeful tired weepy   Thought Content:  Clear    Thought Form:  Coherent  Logical    Insight:    Good      Medication Review:   Changes to psychiatric medications, see updated Medication List in EPIC. Lexapro 20 mg     Medication Compliance:   Yes     Changes in Health Issues:   None reported     Chemical Use Review:   Substance Use: Chemical use reviewed, no active concerns identified      Tobacco Use: No current tobacco use.      Diagnosis:  1. ROLLY (generalized anxiety disorder)    2. Major depressive disorder, single episode, moderate (H)        Collateral Reports Completed:   Routed note to PCP    PLAN: (Patient Tasks / Therapist Tasks / Other)   plan for intentional breaks from \"stuck\" work, better balance  Meeting with counselor about accommodations next  week  Will return in 2 weeks        LOREN Segovia                                                         ______________________________________________________________________    Treatment Plan    Patient's Name: Yady Sellers  YOB: 2000    Date: 8/5/2020    DSM5 Diagnoses: 296.22 (F32.1)  Major Depressive Disorder, Single Episode, Moderate _ and With mixed features or 300.02 (F41.1) Generalized Anxiety Disorder  Psychosocial / Contextual Factors: COVID 19 restrictions                                                                Planning on line classes from home for Fall due to COVID 19                                                                Limited social contacts                                                                Sister with history of significant depression and anxiety                                                                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    Anticipated number of session or this episode of care: will review in 90 days      MeasurableTreatment Goal(s) related to diagnosis / functional impairment(s)  Goal 1: Patient will decrease anxiety symptoms    I will know I've met my goal when I can manage my symptoms.      Objective #A (Patient Action)    Patient will identify 2 fears / thoughts that contribute to feeling anxious.  Status: New - Date: 8/5/2020     Intervention(s)  Therapist will teach emotional recognition/identification. skills.    Objective #B  Patient will use at least 2 coping skills for anxiety management in the next few weeks.  Status: New - Date: 8/5/2020     Intervention(s)  Therapist will teach emotional regulation skills. for anxiety.    Objective #C  Patient will identify three distraction and diversion activities and use those activities to decrease level of anxiety  .  Status: New - Date: 8/5/2020     Intervention(s)  Therapist will teach distraction skills. and diversion skills.      Goal 2: Patient will build skills to manage anxiety reactions    I will know I've met my goal when I have skills to manage my anxiety and feel more resilient.      Objective #A (Patient Action)    Status: New - Date: 8/5/2020     Patient will identify at least 2 triggers for anxiety.    Intervention(s)  Therapist will teach emotional recognition/identification. for triggers.    Objective #B  Patient will use thought-stopping strategy daily to reduce intrusive thoughts.    Status: New - Date: 8/5/2020     Intervention(s)  Therapist will teach Cognitive Behavioral Skills.    Objective #C  Patient will use cognitive strategies identified in therapy to challenge anxious thoughts.  Status: New - Date: 8/5/2020     Intervention(s)  Therapist will teach Cognitive Behavioral Skills.      Patient  mailed  treatment plan for review and signature .      Elle Gonzalez, [facility]  October 20, 2020

## 2020-10-20 NOTE — LETTER
Form sent to college for accomodation plan for anxiety. hopeful increased medication will help as well. Finding some success with plans already put in place

## 2020-10-21 ASSESSMENT — ANXIETY QUESTIONNAIRES: GAD7 TOTAL SCORE: 5

## 2020-11-03 ENCOUNTER — VIRTUAL VISIT (OUTPATIENT)
Dept: PSYCHOLOGY | Facility: CLINIC | Age: 20
End: 2020-11-03
Payer: COMMERCIAL

## 2020-11-03 DIAGNOSIS — F41.1 GAD (GENERALIZED ANXIETY DISORDER): Primary | ICD-10-CM

## 2020-11-03 DIAGNOSIS — F32.1 MAJOR DEPRESSIVE DISORDER, SINGLE EPISODE, MODERATE (H): ICD-10-CM

## 2020-11-03 PROCEDURE — 90837 PSYTX W PT 60 MINUTES: CPT | Mod: 95 | Performed by: SOCIAL WORKER

## 2020-11-03 ASSESSMENT — ANXIETY QUESTIONNAIRES
7. FEELING AFRAID AS IF SOMETHING AWFUL MIGHT HAPPEN: SEVERAL DAYS
3. WORRYING TOO MUCH ABOUT DIFFERENT THINGS: SEVERAL DAYS
GAD7 TOTAL SCORE: 6
4. TROUBLE RELAXING: SEVERAL DAYS
6. BECOMING EASILY ANNOYED OR IRRITABLE: SEVERAL DAYS
5. BEING SO RESTLESS THAT IT IS HARD TO SIT STILL: SEVERAL DAYS
1. FEELING NERVOUS, ANXIOUS, OR ON EDGE: SEVERAL DAYS
2. NOT BEING ABLE TO STOP OR CONTROL WORRYING: NOT AT ALL

## 2020-11-03 ASSESSMENT — PATIENT HEALTH QUESTIONNAIRE - PHQ9: SUM OF ALL RESPONSES TO PHQ QUESTIONS 1-9: 3

## 2020-11-04 ASSESSMENT — ANXIETY QUESTIONNAIRES: GAD7 TOTAL SCORE: 6

## 2020-11-04 NOTE — PROGRESS NOTES
Progress Note    Patient Name: Yady Sellers  Date: 11/3/2020         Service Type: Individual      Session Start Time: 5:05PM  Session End Time: 6:05PM                       Session Length: 55 minutes    Session # 9    Attendees: Client    Service Modality:  Video Visit:    Telemedicine Visit: The patient's condition can be safely assessed and treated via synchronous audio and visual telemedicine encounter.      Reason for Telemedicine Visit: COVID 19 restricitions    Originating Site (Patient Location): Patient's home    Distant Site (Provider Location): [facility] [place]    Consent:  The patient/guardian has verbally consented to: the potential risks and benefits of telemedicine (video visit) versus in person care; bill my insurance or make self-payment for services provided; and responsibility for payment of non-covered services.     Patient would like the video invitation sent by: Text to cell phone: [phone]}     Mode of Communication:  Video Conference via Amwell    As the provider I attest to compliance with applicable laws and regulations related to telemedicine.     Treatment Plan Last Reviewed: 8/5/2020, will be revised on this date, 11/3/2020  PHQ-9 / ROLLY-7 : 11/3/2020    DATA  Interactive Complexity: No  Crisis: No       Progress Since Last Session (Related to Symptoms / Goals / Homework):   Symptoms: Improving been a better two weeks, past 2 days difficult with the election     Homework: Achieved / completed to satisfaction using accomodation plan as needed      Episode of Care Goals: Satisfactory progress - PREPARATION (Decided to change - considering how); Intervened by negotiating a change plan and determining options / strategies for behavior change, identifying triggers, exploring social supports, and working towards setting a date to begin behavior change     Current / Ongoing Stressors and Concerns:   COVID 19  "restrictions              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 Past two days had had heightened anxiety with the election and concerns for the future. Had not anticipated it hitting her. Dicussed how to regulate over this difficult time. Birthday is next week, has some plans, but disappointed not with friends . Plans on a \"do-over\" birthday celebration. Getting excited to return to school for spring semester, with possible plans of staying at school for the summer. Building plans to get though next few weeks until semester is over. Much more balanced and hopeful.        ASSESSMENT: Current Emotional / Mental Status (status of significant symptoms):   Risk status (Self / Other harm or suicidal ideation)   Patient denies current fears or concerns for personal safety.   Patient denies current or recent suicidal ideation or behaviors.   Patient denies current or recent homicidal ideation or behaviors.    No changes reported   Patient denies current or recent self injurious behavior or ideation.   Patient denies other safety concerns.   Patient reports there has been no change in risk factors since their last session.     Patient reports there has been no change in protective factors since their last session.     Recommended that patient call 911 or go to the local ED should there be a change in any of these risk factors.     Appearance:   Appropriate    Eye Contact:   Fair    Psychomotor Behavior: unable to assess due to video session    Attitude:   anxious  "    Orientation:   All   Speech    Rate / Production: Normal     Volume:  Normal    Mood:    Anxious     Affect:    anxious weepy   Thought Content:  Clear    Thought Form:  Coherent  Logical    Insight:    Good      Medication Review:   Changes to psychiatric medications, see updated Medication List in EPIC. Lexapro 20 mg     Medication Compliance:   Yes     Changes in Health Issues:   None reported     Chemical Use Review:   Substance Use: Chemical use reviewed, no active concerns identified      Tobacco Use: No current tobacco use.      Diagnosis:  1. ROLLY (generalized anxiety disorder)    2. Major depressive disorder, single episode, moderate (H)        Collateral Reports Completed:   Routed note to PCP    PLAN: (Patient Tasks / Therapist Tasks / Other)   regulate when anxious about election and change  use accommodations when needed  Will return in 2 weeks        LOREN Segovia LMELVIA                                                         ______________________________________________________________________    Treatment Plan    Patient's Name: Yady Sellers  YOB: 2000    Date: 11/3/2020    DSM5 Diagnoses: 296.22 (F32.1)  Major Depressive Disorder, Single Episode, Moderate _ and With mixed features or 300.02 (F41.1) Generalized Anxiety Disorder  Psychosocial / Contextual Factors: COVID 19 restrictions                                                                Planning on line classes from home for Fall due to COVID 19                                                                Limited social contacts                                                                Sister with history of significant depression and anxiety                                                                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    Anticipated number  of session or this episode of care: will review in 90 days      MeasurableTreatment Goal(s) related to diagnosis / functional impairment(s)  Goal 1: Patient will decrease anxiety symptoms    I will know I've met my goal when I can manage my symptoms.      Objective #A (Patient Action)    Patient will continue to identify 2 fears / thoughts that contribute to feeling anxious.  Status: Continued - Date(s):11/3/2020     Intervention(s)  Therapist will continue to  teach emotional recognition/identification. skills.    Objective #B  Patient will continue to use at least 2 coping skills for anxiety management in the next few weeks.  Status: Continued - Date(s):11/3/2020     Intervention(s)  Therapist will continue to  teach emotional regulation skills. for anxiety.    Objective #C  Patient will continue to identify three distraction and diversion activities and use those activities to decrease level of anxiety  .  Status: Continued - Date(s):11/3/2020     Intervention(s)  Therapist will continue to teach distraction skills. and diversion skills.      Goal 2: Patient will build skills to manage anxiety reactions that affect her academics    I will know I've met my goal when I have skills to manage my anxiety and can complete my academic work.      Objective #A (Patient Action)    Status: New - Date: 11/3/2020     Patient will identify 2 fears / thoughts that contribute to feeling anxious.    Intervention(s)  Therapist will teach emotional recognition/identification. skills.    Objective #B  Patient will use cognitive strategies identified in therapy to challenge anxious thoughts.    Status: New - Date: 11/3/2020     Intervention(s)  Therapist will teach Cognitive Behavioral Skills.    Objective #C  Patient will use regulation skills to balance emotions.  Status: New - Date: 11/3/2020     Intervention(s)  Therapist will teach emotional regulation skills. for anxiety.      Patient will be mailed treatment plan for review  and signature .      Elle Gonzalez, [facility] LMFT  November 3, 2020

## 2020-11-16 ENCOUNTER — HEALTH MAINTENANCE LETTER (OUTPATIENT)
Age: 20
End: 2020-11-16

## 2020-12-01 ENCOUNTER — TELEPHONE (OUTPATIENT)
Dept: PSYCHOLOGY | Facility: CLINIC | Age: 20
End: 2020-12-01

## 2020-12-02 NOTE — TELEPHONE ENCOUNTER
Therapist sent several links for client for 12/1 5-6PM session. No response. Sent email and left phone message with no response.  Session failed.

## 2020-12-15 ENCOUNTER — VIRTUAL VISIT (OUTPATIENT)
Dept: PSYCHOLOGY | Facility: CLINIC | Age: 20
End: 2020-12-15
Payer: COMMERCIAL

## 2020-12-15 DIAGNOSIS — F32.1 MAJOR DEPRESSIVE DISORDER, SINGLE EPISODE, MODERATE (H): ICD-10-CM

## 2020-12-15 DIAGNOSIS — F41.1 GAD (GENERALIZED ANXIETY DISORDER): Primary | ICD-10-CM

## 2020-12-15 PROCEDURE — 90837 PSYTX W PT 60 MINUTES: CPT | Mod: 95 | Performed by: SOCIAL WORKER

## 2020-12-15 ASSESSMENT — ANXIETY QUESTIONNAIRES
3. WORRYING TOO MUCH ABOUT DIFFERENT THINGS: SEVERAL DAYS
5. BEING SO RESTLESS THAT IT IS HARD TO SIT STILL: SEVERAL DAYS
6. BECOMING EASILY ANNOYED OR IRRITABLE: SEVERAL DAYS
4. TROUBLE RELAXING: SEVERAL DAYS
1. FEELING NERVOUS, ANXIOUS, OR ON EDGE: MORE THAN HALF THE DAYS
GAD7 TOTAL SCORE: 7
7. FEELING AFRAID AS IF SOMETHING AWFUL MIGHT HAPPEN: NOT AT ALL
2. NOT BEING ABLE TO STOP OR CONTROL WORRYING: SEVERAL DAYS

## 2020-12-15 ASSESSMENT — PATIENT HEALTH QUESTIONNAIRE - PHQ9: SUM OF ALL RESPONSES TO PHQ QUESTIONS 1-9: 8

## 2020-12-15 NOTE — LETTER
Clients anxiety and negative self talk high as is feeling stress of completing final projects. Is planning to return to college after winter break (end of January)

## 2020-12-16 ASSESSMENT — ANXIETY QUESTIONNAIRES: GAD7 TOTAL SCORE: 7

## 2020-12-16 NOTE — PROGRESS NOTES
Progress Note    Patient Name: Yady Sellers  Date: 12/15/2020         Service Type: Individual      Session Start Time: 5:05PM  Session End Time: 6:05PM                       Session Length: 60 minutes    Session # 10    Attendees: Client    Service Modality:  Video Visit:    Telemedicine Visit: The patient's condition can be safely assessed and treated via synchronous audio and visual telemedicine encounter.      Reason for Telemedicine Visit: COVID 19 restricitions    Originating Site (Patient Location): Patient's home    Distant Site (Provider Location): [facility] [place]    Consent:  The patient/guardian has verbally consented to: the potential risks and benefits of telemedicine (video visit) versus in person care; bill my insurance or make self-payment for services provided; and responsibility for payment of non-covered services.     Patient would like the video invitation sent by: Text to cell phone: [phone]}     Mode of Communication:  Video Conference via Amwell    As the provider I attest to compliance with applicable laws and regulations related to telemedicine.     Treatment Plan Last Reviewed:  11/3/2020  PHQ-9 / ROLLY-7 : 12/15/2020    DATA  Interactive Complexity: No  Crisis: No       Progress Since Last Session (Related to Symptoms / Goals / Homework):   Symptoms: No change up and down with stress of school     Homework: Achieved / completed to satisfaction using accomodation plan as needed      Episode of Care Goals: Satisfactory progress - PREPARATION (Decided to change - considering how); Intervened by negotiating a change plan and determining options / strategies for behavior change, identifying triggers, exploring social supports, and working towards setting a date to begin behavior change     Current / Ongoing Stressors and Concerns:   COVID 19 restrictions              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 Cancelled 11/17 and failed 12/1. Reports slept though her last session. Client reports ups and downs in past few weeks. Finals done. Now has four papers to complete in next week. Frustrated at self for not working over thanksgiving break. On a poor sleep schedule staying up until 4AM and then sleeping until afternoon. Negatives self talk. Fears of her procrastination not being due to this period of time, but will become a life long plan. Worked on self talk and plan for next few weeks. Plans on adding some social time, as isolation is affecting her. Plans to return to college in February.        ASSESSMENT: Current Emotional / Mental Status (status of significant symptoms):   Risk status (Self / Other harm or suicidal ideation)   Patient denies current fears or concerns for personal safety.   Patient denies current or recent suicidal ideation or behaviors.   Patient denies current or recent homicidal ideation or behaviors.    No changes reported   Patient denies current or recent self injurious behavior or ideation.   Patient denies other safety concerns.   Patient reports there has been no change in risk factors since their last session.     Patient reports there has been no change in protective factors since their last session.     Recommended that patient call 911 or go to the local ED should there be a change in any of these risk factors.     Appearance:   Appropriate    Eye Contact:   Fair    Psychomotor Behavior: unable to assess due to video session    Attitude:   anxious  fearful   Orientation:   All   Speech    Rate /  Production: Normal     Volume:  Normal    Mood:    Anxious  fearful   Affect:    anxious weepy   Thought Content:  Clear    Thought Form:  Coherent  Logical    Insight:    Good      Medication Review:   Changes to psychiatric medications, see updated Medication List in EPIC. Lexapro 20 mg     Medication Compliance:   Yes     Changes in Health Issues:   None reported     Chemical Use Review:   Substance Use: Chemical use reviewed, no active concerns identified      Tobacco Use: No current tobacco use.      Diagnosis:  1. ROLLY (generalized anxiety disorder)    2. Major depressive disorder, single episode, moderate (H)        Collateral Reports Completed:   Routed note to PCP    PLAN: (Patient Tasks / Therapist Tasks / Other)   regulate when anxious about school and future  plan for final projects and break  Will return in 2 weeks        LOREN Segovia LMFT                                                         ______________________________________________________________________    Treatment Plan    Patient's Name: Yady Sellers  YOB: 2000    Date: 11/3/2020    DSM5 Diagnoses: 296.22 (F32.1)  Major Depressive Disorder, Single Episode, Moderate _ and With mixed features or 300.02 (F41.1) Generalized Anxiety Disorder  Psychosocial / Contextual Factors: COVID 19 restrictions                                                                Planning on line classes from home for Fall due to COVID 19                                                                Limited social contacts                                                                Sister with history of significant depression and anxiety                                                                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    Anticipated number of session or this episode of  care: will review in 90 days      MeasurableTreatment Goal(s) related to diagnosis / functional impairment(s)  Goal 1: Patient will decrease anxiety symptoms    I will know I've met my goal when I can manage my symptoms.      Objective #A (Patient Action)    Patient will continue to identify 2 fears / thoughts that contribute to feeling anxious.  Status: Continued - Date(s):11/3/2020     Intervention(s)  Therapist will continue to  teach emotional recognition/identification. skills.    Objective #B  Patient will continue to use at least 2 coping skills for anxiety management in the next few weeks.  Status: Continued - Date(s):11/3/2020     Intervention(s)  Therapist will continue to  teach emotional regulation skills. for anxiety.    Objective #C  Patient will continue to identify three distraction and diversion activities and use those activities to decrease level of anxiety  .  Status: Continued - Date(s):11/3/2020     Intervention(s)  Therapist will continue to teach distraction skills. and diversion skills.      Goal 2: Patient will build skills to manage anxiety reactions that affect her academics    I will know I've met my goal when I have skills to manage my anxiety and can complete my academic work.      Objective #A (Patient Action)    Status: New - Date: 11/3/2020     Patient will identify 2 fears / thoughts that contribute to feeling anxious.    Intervention(s)  Therapist will teach emotional recognition/identification. skills.    Objective #B  Patient will use cognitive strategies identified in therapy to challenge anxious thoughts.    Status: New - Date: 11/3/2020     Intervention(s)  Therapist will teach Cognitive Behavioral Skills.    Objective #C  Patient will use regulation skills to balance emotions.  Status: New - Date: 11/3/2020     Intervention(s)  Therapist will teach emotional regulation skills. for anxiety.      Patient will be mailed treatment plan for review and signature .      Elle  Lisa, VASYLSW FT  December 15, 2020

## 2020-12-29 ENCOUNTER — VIRTUAL VISIT (OUTPATIENT)
Dept: PSYCHOLOGY | Facility: CLINIC | Age: 20
End: 2020-12-29
Payer: COMMERCIAL

## 2020-12-29 DIAGNOSIS — F41.1 GAD (GENERALIZED ANXIETY DISORDER): Primary | ICD-10-CM

## 2020-12-29 DIAGNOSIS — F32.1 MAJOR DEPRESSIVE DISORDER, SINGLE EPISODE, MODERATE (H): ICD-10-CM

## 2020-12-29 PROCEDURE — 90837 PSYTX W PT 60 MINUTES: CPT | Mod: 95 | Performed by: SOCIAL WORKER

## 2020-12-29 NOTE — LETTER
Has completed most of college work, and will have a month before she returns to college. Encouraged her to touch base with PCP about medication, and medication needs before going back to college (out of state).

## 2020-12-30 NOTE — PROGRESS NOTES
Progress Note    Patient Name: Yady Sellers  Date: 12/15/2020         Service Type: Individual      Session Start Time: 5:05PM  Session End Time: 6:05PM                       Session Length: 60 minutes    Session # 11    Attendees: Client    Service Modality:  Video Visit:    Telemedicine Visit: The patient's condition can be safely assessed and treated via synchronous audio and visual telemedicine encounter.      Reason for Telemedicine Visit: COVID 19 restricitions    Originating Site (Patient Location): Patient's home    Distant Site (Provider Location): [facility] [place]    Consent:  The patient/guardian has verbally consented to: the potential risks and benefits of telemedicine (video visit) versus in person care; bill my insurance or make self-payment for services provided; and responsibility for payment of non-covered services.     Patient would like the video invitation sent by: Text to cell phone: [phone]}     Mode of Communication:  Video Conference via Amwell    As the provider I attest to compliance with applicable laws and regulations related to telemedicine.     Treatment Plan Last Reviewed:  11/3/2020  PHQ-9 / ROLLY-7 : 12/15/2020    DATA  Interactive Complexity: No  Crisis: No       Progress Since Last Session (Related to Symptoms / Goals / Homework):   Symptoms: Improving some decrease with school work almost completed     Homework: Partially completed completed majority of final projects due. 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 Has one reflection paper to complete. Client is feeling some relief and decrease of symptoms. Plans in building some self care and routine back into days  by a better sleep cycle, adding yoga/body movement, journaling and social time. Returning to college at end of December. Excited to be with friends again, but anxious about anxiety and depression. Explored what this may look like. Encouraged to meet with PCP prior to return to college to make a medication plan as client will be out of state.         ASSESSMENT: Current Emotional / Mental Status (status of significant symptoms):   Risk status (Self / Other harm or suicidal ideation)   Patient denies current fears or concerns for personal safety.   Patient denies current or recent suicidal ideation or behaviors.   Patient denies current or recent homicidal ideation or behaviors.    No changes reported   Patient denies current or recent self injurious behavior or ideation.   Patient denies other safety concerns.   Patient reports there has been no change in risk factors since their last session.     Patient reports there has been no change in protective factors since their last session.     Recommended that patient call 911 or go to the local ED should there be a change in any of these risk factors.     Appearance:   Appropriate    Eye Contact:   Fair    Psychomotor Behavior: unable to assess due to video session    Attitude:   anxious     Orientation:   All   Speech    Rate / Production: Normal     Volume:  Normal    Mood:    Anxious      Affect:    anxious tired   Thought Content:  Clear    Thought Form:  Coherent  Logical    Insight:    Good      Medication Review:   No changes to current psychiatric medication(s)Lexapro 20 mg     Medication Compliance:   Yes     Changes in Health Issues:   None reported     Chemical Use Review:   Substance Use: Chemical use reviewed, no active concerns identified      Tobacco Use: No current tobacco use.      Diagnosis:  1. ROLLY (generalized anxiety disorder)    2. Major depressive disorder, single episode, moderate (H)        Collateral Reports Completed:   Routed note to PCP    PLAN: (Patient Tasks / Therapist Tasks / Other)  Complete final paper  Build routine and self care back into days over next month before returning to school.  Meet with PCP about medications plan prior to returning to school  Will return prior to school return if needed        LOREN Segovia LMELVIA                                                         ______________________________________________________________________    Treatment Plan    Patient's Name: Yady Sellers  YOB: 2000    Date: 11/3/2020    DSM5 Diagnoses: 296.22 (F32.1)  Major Depressive Disorder, Single Episode, Moderate _ and With mixed features or 300.02 (F41.1) Generalized Anxiety Disorder  Psychosocial / Contextual Factors: COVID 19 restrictions                                                                Planning on line classes from home for Fall due to COVID 19                                                                Limited social contacts                                                                Sister with history of significant depression and anxiety                                                                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    Anticipated number of session  or this episode of care: will review in 90 days      MeasurableTreatment Goal(s) related to diagnosis / functional impairment(s)  Goal 1: Patient will decrease anxiety symptoms    I will know I've met my goal when I can manage my symptoms.      Objective #A (Patient Action)    Patient will continue to identify 2 fears / thoughts that contribute to feeling anxious.  Status: Continued - Date(s):11/3/2020     Intervention(s)  Therapist will continue to  teach emotional recognition/identification. skills.    Objective #B  Patient will continue to use at least 2 coping skills for anxiety management in the next few weeks.  Status: Continued - Date(s):11/3/2020     Intervention(s)  Therapist will continue to  teach emotional regulation skills. for anxiety.    Objective #C  Patient will continue to identify three distraction and diversion activities and use those activities to decrease level of anxiety  .  Status: Continued - Date(s):11/3/2020     Intervention(s)  Therapist will continue to teach distraction skills. and diversion skills.      Goal 2: Patient will build skills to manage anxiety reactions that affect her academics    I will know I've met my goal when I have skills to manage my anxiety and can complete my academic work.      Objective #A (Patient Action)    Status: New - Date: 11/3/2020     Patient will identify 2 fears / thoughts that contribute to feeling anxious.    Intervention(s)  Therapist will teach emotional recognition/identification. skills.    Objective #B  Patient will use cognitive strategies identified in therapy to challenge anxious thoughts.    Status: New - Date: 11/3/2020     Intervention(s)  Therapist will teach Cognitive Behavioral Skills.    Objective #C  Patient will use regulation skills to balance emotions.  Status: New - Date: 11/3/2020     Intervention(s)  Therapist will teach emotional regulation skills. for anxiety.      Patient  mailed treatment plan for review and signature  .      Elle Gonzalez, [facility] CareSW LMFT  December 29, 2020

## 2021-01-15 ENCOUNTER — HEALTH MAINTENANCE LETTER (OUTPATIENT)
Age: 21
End: 2021-01-15

## 2021-01-23 ENCOUNTER — MYC MEDICAL ADVICE (OUTPATIENT)
Dept: FAMILY MEDICINE | Facility: CLINIC | Age: 21
End: 2021-01-23

## 2021-01-23 DIAGNOSIS — J45.30 UNCONTROLLED MILD PERSISTENT ASTHMA: ICD-10-CM

## 2021-01-23 DIAGNOSIS — F43.22 ADJUSTMENT DISORDER WITH ANXIOUS MOOD: ICD-10-CM

## 2021-01-25 NOTE — TELEPHONE ENCOUNTER
A.S.    Please see [employer] message below.    Refill request for Lexapro and Flovent  Note from 10/19/2020 VV:    Adjustment disorder with anxious mood  - more depressed and anxious, without suicidal thoughts  Advised to increase the dos of lexapro 20 mg once daily  Continue with counseling.  encouraged excercise- as best as possible  Follow up in 4 weeks      Patient in [place] for school.  Do you want to do a virtual visit?    Samantha García RN

## 2021-02-02 NOTE — PATIENT INSTRUCTIONS
1. Adjustment disorder with anxious mood    - escitalopram (LEXAPRO) 20 MG tablet; Take 1 tablet (20 mg) by mouth daily  Dispense: 30 tablet; Refill: 1    2. Uncontrolled mild persistent asthma  Preventative inhaler - fluticasone (FLOVENT HFA) 220 MCG/ACT inhaler; Inhale 1 puff into the lungs 2 times daily  Dispense: 1 Inhaler; Refill: 1  - montelukast (SINGULAIR) 10 MG tablet; Take 1 tablet (10 mg) by mouth At Bedtime  Dispense: 30 tablet; Refill: 1    3. Chronic allergic rhinitis due to animal hair and dander  - albuterol (PROAIR HFA/PROVENTIL HFA/VENTOLIN HFA) 108 (90 Base) MCG/ACT inhaler; Inhale 2 puffs into the lungs every 6 hours as needed for shortness of breath / dyspnea or wheezing  Dispense: 1 Inhaler; Refill: 1  - montelukast (SINGULAIR) 10 MG tablet; Take 1 tablet (10 mg) by mouth At Bedtime  Dispense: 30 tablet; Refill: 1      Increase lexapr  Safety plan was reviewed; to the ER as needed or call after hours crisis line; [phone]  Sucide prevention life line [id number]  Community out reach for psych emergencies [phone].  Education and counseling was done regarding use of medications, psychotherapy options.    Follow up in 4 weeks, earlier if more concerns    
Normal for race

## 2021-02-03 RX ORDER — FLUTICASONE PROPIONATE 220 UG/1
1 AEROSOL, METERED RESPIRATORY (INHALATION) 2 TIMES DAILY
Qty: 12 G | Refills: 0 | Status: SHIPPED | OUTPATIENT
Start: 2021-02-03 | End: 2021-02-09

## 2021-02-03 RX ORDER — ESCITALOPRAM OXALATE 20 MG/1
20 TABLET ORAL DAILY
Qty: 30 TABLET | Refills: 0 | Status: SHIPPED | OUTPATIENT
Start: 2021-02-03 | End: 2021-02-09

## 2021-02-04 NOTE — TELEPHONE ENCOUNTER
Can we review with risk management.- once more if VV for established patient- that are studying out of state but are actually a resident of [place]- is not ok any more- its very confusing- while trying to help patient- that clearly can benefit with consistent follow up.     Refills given so she is not out of medications . Thanks

## 2021-02-09 ENCOUNTER — VIRTUAL VISIT (OUTPATIENT)
Dept: FAMILY MEDICINE | Facility: CLINIC | Age: 21
End: 2021-02-09
Payer: COMMERCIAL

## 2021-02-09 DIAGNOSIS — Z12.4 SCREENING FOR CERVICAL CANCER: ICD-10-CM

## 2021-02-09 DIAGNOSIS — J45.30 MILD PERSISTENT ASTHMA WITHOUT COMPLICATION: ICD-10-CM

## 2021-02-09 DIAGNOSIS — F43.22 ADJUSTMENT DISORDER WITH ANXIOUS MOOD: Primary | ICD-10-CM

## 2021-02-09 DIAGNOSIS — J30.81 CHRONIC ALLERGIC RHINITIS DUE TO ANIMAL HAIR AND DANDER: ICD-10-CM

## 2021-02-09 PROCEDURE — 99214 OFFICE O/P EST MOD 30 MIN: CPT | Mod: 95 | Performed by: FAMILY MEDICINE

## 2021-02-09 RX ORDER — ALBUTEROL SULFATE 90 UG/1
2 AEROSOL, METERED RESPIRATORY (INHALATION) EVERY 6 HOURS PRN
Qty: 1 INHALER | Refills: 11 | Status: SHIPPED | OUTPATIENT
Start: 2021-02-09 | End: 2021-12-13

## 2021-02-09 RX ORDER — MONTELUKAST SODIUM 10 MG/1
10 TABLET ORAL AT BEDTIME
Qty: 120 TABLET | Refills: 1 | Status: SHIPPED | OUTPATIENT
Start: 2021-02-09 | End: 2021-12-13

## 2021-02-09 RX ORDER — FLUTICASONE PROPIONATE 220 UG/1
1 AEROSOL, METERED RESPIRATORY (INHALATION) 2 TIMES DAILY
Qty: 12 G | Refills: 4 | Status: SHIPPED | OUTPATIENT
Start: 2021-02-09 | End: 2022-01-12

## 2021-02-09 RX ORDER — ESCITALOPRAM OXALATE 20 MG/1
20 TABLET ORAL DAILY
Qty: 120 TABLET | Refills: 2 | Status: SHIPPED | OUTPATIENT
Start: 2021-02-09 | End: 2021-10-06

## 2021-02-09 ASSESSMENT — ANXIETY QUESTIONNAIRES
6. BECOMING EASILY ANNOYED OR IRRITABLE: NOT AT ALL
3. WORRYING TOO MUCH ABOUT DIFFERENT THINGS: NOT AT ALL
IF YOU CHECKED OFF ANY PROBLEMS ON THIS QUESTIONNAIRE, HOW DIFFICULT HAVE THESE PROBLEMS MADE IT FOR YOU TO DO YOUR WORK, TAKE CARE OF THINGS AT HOME, OR GET ALONG WITH OTHER PEOPLE: NOT DIFFICULT AT ALL
7. FEELING AFRAID AS IF SOMETHING AWFUL MIGHT HAPPEN: NOT AT ALL
2. NOT BEING ABLE TO STOP OR CONTROL WORRYING: NOT AT ALL
5. BEING SO RESTLESS THAT IT IS HARD TO SIT STILL: NOT AT ALL
1. FEELING NERVOUS, ANXIOUS, OR ON EDGE: NOT AT ALL
GAD7 TOTAL SCORE: 1

## 2021-02-09 ASSESSMENT — PATIENT HEALTH QUESTIONNAIRE - PHQ9
5. POOR APPETITE OR OVEREATING: SEVERAL DAYS
SUM OF ALL RESPONSES TO PHQ QUESTIONS 1-9: 3

## 2021-02-09 NOTE — LETTER
My Asthma Action Plan    Name: Yady Sellers   YOB: 2000  Date: 2/9/2021   My doctor: Adriana Jimenez MD   My clinic: [facility]        My Control Medicine: Fluticasone propionate (Flovent HFA) - 220 mcg twice a day  My Rescue Medicine: Albuterol (Proair/Ventolin/Proventil HFA) 2-4 puffs EVERY 4 HOURS as needed. Use a spacer if recommended by your provider.   My Asthma Severity:   Mild Persistent  Know your asthma triggers: upper respiratory infections, pollens, animal dander, exercise or sports and cold air  humidity  Patient is unaware of triggers            GREEN ZONE   Good Control    I feel good    No cough or wheeze    Can work, sleep and play without asthma symptoms       Take your asthma control medicine every day.     1. If exercise triggers your asthma, take your rescue medication    15 minutes before exercise or sports, and    During exercise if you have asthma symptoms  2. Spacer to use with inhaler: If you have a spacer, make sure to use it with your inhaler             YELLOW ZONE Getting Worse  I have ANY of these:    I do not feel good    Cough or wheeze    Chest feels tight    Wake up at night   1. Keep taking your Green Zone medications  2. Start taking your rescue medicine:    every 20 minutes for up to 1 hour. Then every 4 hours for 24-48 hours.  3. If you stay in the Yellow Zone for more than 12-24 hours, contact your doctor.  4. If you do not return to the Green Zone in 12-24 hours or you get worse, start taking your oral steroid medicine if prescribed by your provider.           RED ZONE Medical Alert - Get Help  I have ANY of these:    I feel awful    Medicine is not helping    Breathing getting harder    Trouble walking or talking    Nose opens wide to breathe       1. Take your rescue medicine NOW  2. If your provider has prescribed an oral steroid medicine, start taking it NOW  3. Call your doctor NOW  4. If you are still in the Red Zone after 20  minutes and you have not reached your doctor:    Take your rescue medicine again and    Call 911 or go to the emergency room right away    See your regular doctor within 2 weeks of an Emergency Room or Urgent Care visit for follow-up treatment.          Annual Reminders:  Meet with Asthma Educator,  Flu Shot in the Fall, consider Pneumonia Vaccination for patients with asthma (aged 19 and older).    Pharmacy: [facility] [id number] [address]    Electronically signed by Adriana Jimenez MD   Date: 02/09/21                      Asthma Triggers  How To Control Things That Make Your Asthma Worse    Triggers are things that make your asthma worse.  Look at the list below to help you find your triggers and what you can do about them.  You can help prevent asthma flare-ups by staying away from your triggers.      Trigger                                                          What you can do   Cigarette Smoke  Tobacco smoke can make asthma worse. Do not allow smoking in your home, car or around you.  Be sure no one smokes at a child s day care or school.  If you smoke, ask your health care provider for ways to help you quit.  Ask family members to quit too.  Ask your health care provider for a referral to Quit Plan to help you quit smoking, or call 0-665-113-PLAN.     Colds, Flu, Bronchitis  These are common triggers of asthma. Wash your hands often.  Don t touch your eyes, nose or mouth.  Get a flu shot every year.     Dust Mites  These are tiny bugs that live in cloth or carpet. They are too small to see. Wash sheets and blankets in hot water every week.   Encase pillows and mattress in dust mite proof covers.  Avoid having carpet if you can. If you have carpet, vacuum weekly.   Use a dust mask and HEPA vacuum.   Pollen and Outdoor Mold  Some people are allergic to trees, grass, or weed pollen, or molds. Try to keep your windows closed.  Limit time out doors when pollen count is high.   Ask you  health care provider about taking medicine during allergy season.     Animal Dander  Some people are allergic to skin flakes, urine or saliva from pets with fur or feathers. Keep pets with fur or feathers out of your home.    If you can t keep the pet outdoors, then keep the pet out of your bedroom.  Keep the bedroom door closed.  Keep pets off cloth furniture and away from stuffed toys.     Mice, Rats, and Cockroaches   Some people are allergic to the waste from these pests.   Cover food and garbage.  Clean up spills and food crumbs.  Store grease in the refrigerator.   Keep food out of the bedroom.   Indoor Mold  This can be a trigger if your home has high moisture. Fix leaking faucets, pipes, or other sources of water.   Clean moldy surfaces.  Dehumidify basement if it is damp and smelly.   Smoke, Strong Odors, and Sprays  These can reduce air quality. Stay away from strong odors and sprays, such as perfume, powder, hair spray, paints, smoke incense, paint, cleaning products, candles and new carpet.   Exercise or Sports  Some people with asthma have this trigger. Be active!  Ask your doctor about taking medicine before sports or exercise to prevent symptoms.    Warm up for 5-10 minutes before and after sports or exercise.     Other Triggers of Asthma  Cold air:  Cover your nose and mouth with a scarf.  Sometimes laughing or crying can be a trigger.  Some medicines and food can trigger asthma.

## 2021-02-09 NOTE — PROGRESS NOTES
Yady is a 20 year old who is being evaluated via a billable video visit.      How would you like to obtain your AVS? MyChart  If the video visit is dropped, the invitation should be resent by:   Will anyone else be joining your video visit? No      Video Start Time: 3:05 PM    Assessment & Plan     Adjustment disorder with anxious mood  Improved and doing well on lexapro  Refill is sent.  - escitalopram (LEXAPRO) 20 MG tablet , patient requested 4 months supply as busy at school, Dispense: 120 tablet; Refill: 2. Follow up 4-6 months , earlier as needed  If concerns with mood or medications       ROLLY-7 SCORE 11/3/2020 12/15/2020 2/9/2021   Total Score - - -   Total Score 6 7 1     PHQ 11/3/2020 12/15/2020 2/9/2021   PHQ-9 Total Score 3 8 3   Q9: Thoughts of better off dead/self-harm past 2 weeks Not at all Not at all Not at all   Potential medication side effects were discussed with the patient; let me know if any occur.        Mild persistent asthma without complication  Improved on ICS- refill giev   - fluticasone (FLOVENT HFA) 220 MCG/ACT inhaler  Dispense: 12 g; Refill: 4  - montelukast (SINGULAIR) 10 MG tablet  Dispense: 120 tablet; Refill: 1  Follow up in 6 months, earlier if concerns   Chronic allergic rhinitis due to animal hair and dander  - albuterol (PROAIR HFA/PROVENTIL HFA/VENTOLIN HFA) 108 (90 Base) MCG/ACT inhaler  Dispense: 1 Inhaler; Refill: 11  - montelukast (SINGULAIR) 10 MG tablet  Dispense: 120 tablet; Refill: 1    Screening for cervical cancer  Informed about need for physical exam.  She will look into when able to complete        30 minutes spent on the date of the encounter doing chart review, history and exam, documentation and further activities as noted above      Return in about 4 months (around 6/9/2021) for routine physical.    Adriana Jimenez MD  [facility]   Yady is a 20 year old who presents for the following health issues     HPI        Depression and Anxiety Follow-Up    How are you doing with your depression since your last visit? Improved     How are you doing with your anxiety since your last visit?  Improved     Are you having other symptoms that might be associated with depression or anxiety? No    Have you had a significant life event? OTHER: started college     Do you have any concerns with your use of alcohol or other drugs? No  Back at school.  Doing much better.  Has a roommate.  Distant learning going well.  Social History     Tobacco Use     Smoking status: Never Smoker     Smokeless tobacco: Never Used   Substance Use Topics     Alcohol use: Yes     Drug use: Never     PHQ 11/3/2020 12/15/2020 2/9/2021   PHQ-9 Total Score 3 8 3   Q9: Thoughts of better off dead/self-harm past 2 weeks Not at all Not at all Not at all     ROLLY-7 SCORE 11/3/2020 12/15/2020 2/9/2021   Total Score - - -   Total Score 6 7 1     Last PHQ-9 2/9/2021   1.  Little interest or pleasure in doing things 0   2.  Feeling down, depressed, or hopeless 0   3.  Trouble falling or staying asleep, or sleeping too much 1   4.  Feeling tired or having little energy 1   5.  Poor appetite or overeating 0   6.  Feeling bad about yourself 0   7.  Trouble concentrating 1   8.  Moving slowly or restless 0   Q9: Thoughts of better off dead/self-harm past 2 weeks 0   PHQ-9 Total Score 3   Difficulty at work, home, or with people Not difficult at all     ROLLY-7  2/9/2021   1. Feeling nervous, anxious, or on edge 0   2. Not being able to stop or control worrying 0   3. Worrying too much about different things 0   4. Trouble relaxing 1   5. Being so restless that it is hard to sit still 0   6. Becoming easily annoyed or irritable 0   7. Feeling afraid, as if something awful might happen 0   ROLLY-7 Total Score 1   If you checked any problems, how difficult have they made it for you to do your work, take care of things at home, or get along with other people? Not difficult at all        Suicide Assessment Five-step Evaluation and Treatment (SAFE-T)    Asthma Follow-Up  Better with new inhaler.  No side effects from  Medications   Was ACT completed today?    Yes    ACT Total Scores 2/9/2021   ACT TOTAL SCORE (Goal Greater than or Equal to 20) 20   In the past 12 months, how many times did you visit the emergency room for your asthma without being admitted to the hospital? 0   In the past 12 months, how many times were you hospitalized overnight because of your asthma? 0          How many days per week do you miss taking your asthma controller medication?  0    Please describe any recent triggers for your asthma: humidity and Patient is unaware of triggers    Have you had any Emergency Room Visits, Urgent Care Visits, or Hospital Admissions since your last office visit?  No      How many servings of fruits and vegetables do you eat daily?  4 or more    On average, how many sweetened beverages do you drink each day (Examples: soda, juice, sweet tea, etc.  Do NOT count diet or artificially sweetened beverages)?   0    How many days per week do you exercise enough to make your heart beat faster? 3 or less    How many minutes a day do you exercise enough to make your heart beat faster? 30 - 60    How many days per week do you miss taking your medication? 0        Review of Systems   Constitutional, HEENT, cardiovascular, pulmonary, GI, , musculoskeletal, neuro, skin, endocrine and psych systems are negative, except as otherwise noted.      Objective           Vitals:  No vitals were obtained today due to virtual visit.    Physical Exam   GENERAL: Healthy, alert and no distress  EYES: Eyes grossly normal to inspection.  No discharge or erythema, or obvious scleral/conjunctival abnormalities.  RESP: No audible wheeze, cough, or visible cyanosis.  No visible retractions or increased work of breathing.    SKIN: Visible skin clear. No significant rash, abnormal pigmentation or lesions.  NEURO: Cranial  nerves grossly intact.  Mentation and speech appropriate for age.  PSYCH: Mentation appears normal, affect normal/bright, judgement and insight intact, normal speech and appearance well-groomed.                Video-Visit Details    Type of service:  Video Visit    Video End Time:3:11 PM    Originating Location (pt. Location): Home    Distant Location (provider location):  [facility]     Platform used for Video Visit: [employer]

## 2021-02-09 NOTE — PATIENT INSTRUCTIONS
Need for pap smear, its cervical cancer screening- make an appointment at [facility] for phsyical with me- when possible    Follow up for mental health in 4-6 months  Follow up for asthma 6 months  Follow up earlier as needed

## 2021-02-10 ASSESSMENT — ANXIETY QUESTIONNAIRES: GAD7 TOTAL SCORE: 1

## 2021-02-10 ASSESSMENT — ASTHMA QUESTIONNAIRES: ACT_TOTALSCORE: 20

## 2021-04-03 ENCOUNTER — HEALTH MAINTENANCE LETTER (OUTPATIENT)
Age: 21
End: 2021-04-03

## 2021-04-11 ENCOUNTER — DOCUMENTATION ONLY (OUTPATIENT)
Dept: PSYCHOLOGY | Facility: CLINIC | Age: 21
End: 2021-04-11

## 2021-04-11 NOTE — PROGRESS NOTES
Discharge Summary  Multiple Sessions    Client Name: Yady Sellers MRN#: [id number] YOB: 2000      Intake / Discharge Date: Intake 7/28/2020 seen 10 more times through 12/29/2020    DSM5 Diagnoses: (Sustained by DSM5 Criteria Listed Above)  Diagnoses: 296.22 (F32.1)  Major Depressive Disorder, Single Episode, Moderate _ and With mixed features  300.02 (F41.1) Generalized Anxiety Disorder  Psychosocial & Contextual Factors: COVID 19 restrictions, on line college classes at home due to COVID 19, limited social contacts, struggles with academic focus and work completion, sister with significant history of anxiety and depression  WHODAS 2.0 (12 item) Score: 15          Presenting Concern:  Anxiety, struggles with academic focus and work completion      Reason for Discharge:  Client did not return   Returning to in person college out of state      Disposition at Time of Last Encounter:   Comments:   Still some struggles, but returning to in person college out of state       Risk Management:   Client denies a history of suicidal ideation, suicide attempts, self-injurious behavior, homicidal ideation, homicidal behavior and and other safety concerns  Recommended that patient call 911 or go to the local ED should there be a change in any of these risk factors.      Referred To:  PCP for medication monitoring        LOREN Segovia LMFT  4/11/2021

## 2021-05-27 ENCOUNTER — VIRTUAL VISIT (OUTPATIENT)
Dept: FAMILY MEDICINE | Facility: CLINIC | Age: 21
End: 2021-05-27
Payer: COMMERCIAL

## 2021-05-27 DIAGNOSIS — R21 RASH: Primary | ICD-10-CM

## 2021-05-27 PROCEDURE — 99213 OFFICE O/P EST LOW 20 MIN: CPT | Mod: 95 | Performed by: FAMILY MEDICINE

## 2021-05-27 RX ORDER — DOXYCYCLINE 100 MG/1
100 CAPSULE ORAL 2 TIMES DAILY
Qty: 28 CAPSULE | Refills: 0 | Status: SHIPPED | OUTPATIENT
Start: 2021-05-27 | End: 2021-07-08

## 2021-05-27 NOTE — PROGRESS NOTES
Yady is a 20 year old who is being evaluated via a billable video visit.      How would you like to obtain your AVS? MyChart  If the video visit is dropped, the invitation should be resent by: Text to cell phone: [phone]  Will anyone else be joining your video visit? No      Video Start Time: 9:02 AM    Assessment & Plan     Rash  Rash seems to be quite typical of erythema  Multiforme , and antibiotic is prescribed for 14 days  Other symptoms of concern for lymes discussed and encouraged to follow up  for unresolved or more concerns as needed    - doxycycline hyclate (VIBRAMYCIN) 100 MG capsule; Take 1 capsule (100 mg) by mouth 2 times daily    Potential medication side effects were discussed with the patient; let me know if any occur.      Return in about 2 weeks (around 6/10/2021) for routine physical.    Adriana Jimenez MD  [facility]    Subjective   Yady is a 20 year old who presents for the following health issues     HPI     Concern - tick bite   Onset: noticed it 2-3 days ago   Description: right arm bulls eye bite   Intensity: mild  Progression of Symptoms:  same  Accompanying Signs & Symptoms: none   Previous history of similar problem: no  Precipitating factors:        Worsened by:   Alleviating factors:        Improved by:   Therapies tried and outcome:  none       Review of Systems   Constitutional, HEENT, cardiovascular, pulmonary, GI, , musculoskeletal, neuro, skin, endocrine and psych systems are negative, except as otherwise noted.      Objective           Vitals:  No vitals were obtained today due to virtual visit.    Physical Exam   GENERAL: Healthy, alert and no distress  EYES: Eyes grossly normal to inspection.  No discharge or erythema, or obvious scleral/conjunctival abnormalities.  RESP: No audible wheeze, cough, or visible cyanosis.  No visible retractions or increased work of breathing.    SKIN: right upper inner arm- rash- about an inch- erythema with  central necrotic tissue- more like EM- but difficult to assess because of video quality  NEURO: Cranial nerves grossly intact.  Mentation and speech appropriate for age.  PSYCH: Mentation appears normal, affect normal/bright, judgement and insight intact, normal speech and appearance well-groomed.              Video-Visit Details    Type of service:  Video Visit    Video End Time:9:11 AM    Originating Location (pt. Location): Home    Distant Location (provider location):  [facility]     Platform used for Video Visit: MessageCast

## 2021-07-06 NOTE — PROGRESS NOTES
Yady is a 20 year old No obstetric history on file. female who presents for annual exam.     Besides routine health maintenance, {NO OTHER:289300}.    HPI:  The patient's PCP is Adriana Jimenez MD.  ***      GYNECOLOGIC HISTORY:    No LMP recorded.    Regular menses? { :657037}  Menses every *** days.  Length of menses: {NUMBERS 1-16:10} days    Her current contraception method is: {PLC CONTRACEPTION:525973}.  She  reports that she has never smoked. She has never used smokeless tobacco.  {Tobacco Cessation -- Delete if patient is a non-smoker:966510}  Patient {IS/IS NOT:9024} sexually active.  STD testing offered?  {GC/CHLAMYDIA:018444}  Last PHQ-9 score on record =   PHQ-9 SCORE 2/9/2021   PHQ-9 Total Score MyChart -   PHQ-9 Total Score 3     Last GAD7 score on record =   ROLLY-7 SCORE 2/9/2021   Total Score -   Total Score 1     Alcohol Score = ***    HEALTH MAINTENANCE:  Cholesterol: None found  Last Mammo: Not applicable, Result: Not applicable, Next Mammo: Due at age 39   Pap: Never  Colonoscopy:  Never, Result: Not applicable, Next Colonoscopy: Age 44  Dexa:  NA    Health maintenance updated:  {yes no:465311}    HISTORY:  OB History   No obstetric history on file.       Patient Active Problem List   Diagnosis     Melanocytic nevi of trunk     Numerous moles     Immunity status testing     Adjustment disorder with anxious mood     Chronic allergic rhinitis due to animal hair and dander     Asthma, persistent controlled     Mild persistent asthma without complication     Screening for cervical cancer     Past Surgical History:   Procedure Laterality Date     ENT SURGERY  2003    Ear tubes      Social History     Tobacco Use     Smoking status: Never Smoker     Smokeless tobacco: Never Used   Substance Use Topics     Alcohol use: Yes      Problem (# of Occurrences) Relation (Name,Age of Onset)    Anxiety Disorder (1) Sister (Kaitlyn Sellers)    Cerebrovascular Disease (1) Paternal Grandmother (Katelyn Sellers)     "Colon Cancer (1) Maternal Grandmother (Jordyn Matson)    Depression (2) Mother (Lori Matson), Sister (Kaitlyn Sellers)    Diabetes (1) Paternal Grandfather (Oliver Sellers)    Hypertension (1) Paternal Grandfather (Oliver Sellers)    Obesity (1) Sister (Kaitlyn Sellers)            Current Outpatient Medications   Medication Sig     albuterol (PROAIR HFA/PROVENTIL HFA/VENTOLIN HFA) 108 (90 Base) MCG/ACT inhaler Inhale 2 puffs into the lungs every 6 hours as needed for shortness of breath / dyspnea or wheezing     doxycycline hyclate (VIBRAMYCIN) 100 MG capsule Take 1 capsule (100 mg) by mouth 2 times daily     escitalopram (LEXAPRO) 20 MG tablet Take 1 tablet (20 mg) by mouth daily     fluticasone (FLONASE) 50 MCG/ACT spray Spray 1-2 sprays into both nostrils daily     fluticasone (FLOVENT HFA) 220 MCG/ACT inhaler Inhale 1 puff into the lungs 2 times daily     loratadine (CLARITIN) 10 MG tablet Take 1 tablet (10 mg) by mouth daily     montelukast (SINGULAIR) 10 MG tablet Take 1 tablet (10 mg) by mouth At Bedtime     No current facility-administered medications for this visit.      No Known Allergies    Past medical, surgical, social and family histories were reviewed and updated in EPIC.    ROS:   {[facility] ROSGYN:763461::\"12 point review of systems negative other than symptoms noted below or in the HPI.\"}  {ROS - :020704::\"No urinary frequency or dysuria, bladder or kidney problems\"}    EXAM:  There were no vitals taken for this visit.   BMI: There is no height or weight on file to calculate BMI.    PHYSICAL EXAM:  Constitutional:   Appearance: Well nourished, well developed, alert, in no acute distress  Neck:  Lymph Nodes:  No lymphadenopathy present    Thyroid:  Gland size normal, nontender, no nodules or masses present  on palpation  Chest:  Respiratory Effort:  Breathing unlabored  Cardiovascular:    Heart: Auscultation:  Regular rate, normal rhythm, no murmurs present  Breasts: {[facility] Breast " "exam:829959}  Gastrointestinal:   Abdominal Examination:  Abdomen nontender to palpation, tone normal without rigidity or guarding, no masses present, umbilicus without lesions   Liver and Spleen:  No hepatomegaly present, liver nontender to palpation    Hernias:  No hernias present  Lymphatic: Lymph Nodes:  No other lymphadenopathy present  Skin:  General Inspection:  No rashes present, no lesions present, no areas of  discoloration  Neurologic:    Mental Status:  Oriented X3.  Normal strength and tone, sensory exam                grossly normal, mentation intact and speech normal.    Psychiatric:   Mentation appears normal and affect normal/bright.         {PLC Pelvic Exam:541699}    COUNSELING:   {FEMALE COUNSELING MESSAGES:490160::\"Reviewed preventive health counseling, as reflected in patient instructions\"}    BMI: There is no height or weight on file to calculate BMI.  {Obesity Action Plan -- Delete if BMA < 25:226974}    ASSESSMENT:  20 year old female with satisfactory annual exam.  No diagnosis found.    PLAN:  ***    ONEYDA Hitchcock CNM  "

## 2021-07-08 ENCOUNTER — OFFICE VISIT (OUTPATIENT)
Dept: MIDWIFE SERVICES | Facility: CLINIC | Age: 21
End: 2021-07-08
Payer: COMMERCIAL

## 2021-07-08 VITALS — BODY MASS INDEX: 23.6 KG/M2 | WEIGHT: 168.6 LBS | HEIGHT: 71 IN

## 2021-07-08 DIAGNOSIS — Z01.419 ENCOUNTER FOR GYNECOLOGICAL EXAMINATION WITHOUT ABNORMAL FINDING: Primary | ICD-10-CM

## 2021-07-08 PROCEDURE — G0145 SCR C/V CYTO,THINLAYER,RESCR: HCPCS | Performed by: ADVANCED PRACTICE MIDWIFE

## 2021-07-08 PROCEDURE — 99385 PREV VISIT NEW AGE 18-39: CPT | Performed by: ADVANCED PRACTICE MIDWIFE

## 2021-07-08 ASSESSMENT — ANXIETY QUESTIONNAIRES
IF YOU CHECKED OFF ANY PROBLEMS ON THIS QUESTIONNAIRE, HOW DIFFICULT HAVE THESE PROBLEMS MADE IT FOR YOU TO DO YOUR WORK, TAKE CARE OF THINGS AT HOME, OR GET ALONG WITH OTHER PEOPLE: NOT DIFFICULT AT ALL
5. BEING SO RESTLESS THAT IT IS HARD TO SIT STILL: NOT AT ALL
2. NOT BEING ABLE TO STOP OR CONTROL WORRYING: NOT AT ALL
7. FEELING AFRAID AS IF SOMETHING AWFUL MIGHT HAPPEN: NOT AT ALL
1. FEELING NERVOUS, ANXIOUS, OR ON EDGE: NOT AT ALL
3. WORRYING TOO MUCH ABOUT DIFFERENT THINGS: NOT AT ALL
GAD7 TOTAL SCORE: 0
6. BECOMING EASILY ANNOYED OR IRRITABLE: NOT AT ALL

## 2021-07-08 ASSESSMENT — MIFFLIN-ST. JEOR: SCORE: 1625.89

## 2021-07-08 ASSESSMENT — PATIENT HEALTH QUESTIONNAIRE - PHQ9
5. POOR APPETITE OR OVEREATING: NOT AT ALL
SUM OF ALL RESPONSES TO PHQ QUESTIONS 1-9: 3

## 2021-07-08 NOTE — PROGRESS NOTES
Yady is a 20 year old  female who presents for annual exam.     Besides routine health maintenance, she has no other health concerns today .  HPI: Yady is feeling well, most questions today are about contraceptive methods. Has not previously been sexually active, but wants to be prepared for this to change in the next year. Interested in Nexplanon or IUD. Specifically prefers a method that is least likely to cause mood changes and weight gain.   Menses are regular q 28-30 days and normal lasting 4 days.   Menses flow: normal.    Patient's last menstrual period was 2021 (exact date).   Not using contraception, never sexually active.  She is not currently considering pregnancy.    REPRODUCTIVE/GYNECOLOGIC HISTORY:  STI testing offered?  N/A, Never sexually active  History of abnormal Pap smear:  NA  SOCIAL HISTORY  She  reports that she has never smoked. She has never used smokeless tobacco.    Last PHQ-9 score on record =   PHQ-9 SCORE 2021   PHQ-9 Total Score MyChart -   PHQ-9 Total Score 3     Last GAD7 score on record =   ROLLY-7 SCORE 2021   Total Score -   Total Score 0     Alcohol Score = 4    HEALTH MAINTENANCE:  Cholesterol: None found History of abnormal lipids: NA  Mammo: Never. History of abnormal Mammo: NA.  Regular Self Breast Exams: No  TSH:   TSH   Date Value Ref Range Status   2018 1.72 0.40 - 4.00 mU/L Final      Pap: Never  Immunizations up to date: yes  (Gardasil, Tdap, Flu)  Health maintenance updated:  no, due for pap    HEALTHY HABITS  Eating habits: eats regular meals and follows a balanced nutrition diet    HISTORY:  OB History    Para Term  AB Living   0 0 0 0 0 0   SAB TAB Ectopic Multiple Live Births   0 0 0 0 0     Past Medical History:   Diagnosis Date     Anxiety      Depressive disorder 2020    possibly earlier but never as severe     Uncomplicated asthma 03/10/2018     Past Surgical History:   Procedure Laterality Date     ENT SURGERY   2003    Ear tubes     Family History   Problem Relation Age of Onset     Diabetes Paternal Grandfather      Hypertension Paternal Grandfather      Heart Disease Paternal Grandfather      Cerebrovascular Disease Paternal Grandmother      Breast Cancer Paternal Grandmother      Colon Cancer Maternal Grandmother      Depression Mother      Depression Sister      Anxiety Disorder Sister      Obesity Sister      No Known Problems Father      No Known Problems Brother      Heart Disease Maternal Grandfather      Gout Maternal Grandfather      No Known Problems Other      Social History     Socioeconomic History     Marital status: Single     Spouse name: None     Number of children: None     Years of education: None     Highest education level: None   Occupational History     None   Social Needs     Financial resource strain: None     Food insecurity     Worry: None     Inability: None     Transportation needs     Medical: None     Non-medical: None   Tobacco Use     Smoking status: Never Smoker     Smokeless tobacco: Never Used   Substance and Sexual Activity     Alcohol use: Yes     Drug use: Never     Sexual activity: Never   Lifestyle     Physical activity     Days per week: None     Minutes per session: None     Stress: None   Relationships     Social connections     Talks on phone: None     Gets together: None     Attends Church service: None     Active member of club or organization: None     Attends meetings of clubs or organizations: None     Relationship status: None     Intimate partner violence     Fear of current or ex partner: None     Emotionally abused: None     Physically abused: None     Forced sexual activity: None   Other Topics Concern     Parent/sibling w/ CABG, MI or angioplasty before 65F 55M? No   Social History Narrative     None       Current Outpatient Medications:      albuterol (PROAIR HFA/PROVENTIL HFA/VENTOLIN HFA) 108 (90 Base) MCG/ACT inhaler, Inhale 2 puffs into the lungs every 6  "hours as needed for shortness of breath / dyspnea or wheezing, Disp: 1 Inhaler, Rfl: 11     escitalopram (LEXAPRO) 20 MG tablet, Take 1 tablet (20 mg) by mouth daily, Disp: 120 tablet, Rfl: 2     fluticasone (FLOVENT HFA) 220 MCG/ACT inhaler, Inhale 1 puff into the lungs 2 times daily, Disp: 12 g, Rfl: 4     montelukast (SINGULAIR) 10 MG tablet, Take 1 tablet (10 mg) by mouth At Bedtime (Patient taking differently: Take 10 mg by mouth as needed ), Disp: 120 tablet, Rfl: 1     fluticasone (FLONASE) 50 MCG/ACT spray, Spray 1-2 sprays into both nostrils daily (Patient not taking: Reported on 7/8/2021), Disp: 1 g, Rfl: 1   No Known Allergies    Past medical, surgical, social and family history were reviewed and updated in EPIC.    ROS:   12 point review of systems negative other than symptoms noted below or in the HPI.    PHYSICAL EXAM:  Ht 1.803 m (5' 11\")   Wt 76.5 kg (168 lb 9.6 oz)   LMP 06/18/2021 (Exact Date)   BMI 23.51 kg/m     BMI: Body mass index is 23.51 kg/m .  Constitutional: healthy, alert and no distress  Neck: symmetrical, thyroid normal size, no masses present, no lymphadenopathy present.   Cardiovascular: RRR, no murmurs present  Respiratory: breathing unlabored, lungs CTA bilaterally  Breast:normal without masses, tenderness or nipple discharge and no palpable axillary masses or adenopathy  Gastrointestinal: abdomen soft, non-tender, bowel sounds present  PELVIC EXAM:  Vulva: No lesions, no adenopathy, BUS WNL  Vagina: Moist, pink, discharge normal  well rugated, no lesions  Cervix:smooth, pink, no visible lesions  Uterus: Normal size, non-tender, mobile  Ovaries: No masses palpated  Rectal exam: deferred    ASSESSMENT/PLAN:    ICD-10-CM    1. Encounter for gynecological examination without abnormal finding  Z01.419 Pap imaged thin layer screen only - recommended age 21 - 24 years     No results found for any visits on 07/08/21.      COUNSELING:   Reviewed preventive health counseling, as reflected " in patient instructions   reports that she has never smoked. She has never used smokeless tobacco.    Discussed birth control options of OCP, Nexplanon, and IUDs in depth. Reviewed benefits and risks of each form. She is leaning toward an IUD (unsure at this point what type). Plan to schedule an appointment with us for an IUD insertion when on menses. Discussed taking 600 mg ibuprofen 1 hour before appointment. If she has any other questions or concerns she will MyChart or schedule a phone visit.     60 minutes spent on the date of the encounter doing chart review, history and exam, documentation and further activities per the note. 20 minutes used to conduct annual exam portion and 40 minutes of time was to discussed birth control options and answer questions.     Mian Lagunas, DNP, APRN, CNM

## 2021-07-08 NOTE — PATIENT INSTRUCTIONS
Yady,    My contact information is:    Mian Lagunas, CYNTHIA, APRN, CNM  [email or web address]    It was nice to meet you!      Preventive Health Recommendations  Female Ages 21 to 25     Yearly exam:     See your health care provider every year in order to  o Review health changes.   o Discuss preventive care.    o Review your medicines if your doctor has prescribed any.      You should be tested each year for STDs (sexually transmitted diseases).       Talk to your provider about how often you should have cholesterol testing.      Get a Pap test every three years. If you have an abnormal result, your doctor may have you test more often.      If you are at risk for diabetes, you should have a diabetes test (fasting glucose).     Shots:     Get a flu shot each year.     Get a tetanus shot every 10 years.     Consider getting the shot (vaccine) that prevents cervical cancer (Gardasil).    Nutrition:     Eat at least 5 servings of fruits and vegetables each day.    Eat whole-grain bread, whole-wheat pasta and brown rice instead of white grains and rice.    Get adequate Calcium and Vitamin D.     Lifestyle    Exercise at least 150 minutes a week each week (30 minutes a day, 5 days a week). This will help you control your weight and prevent disease.    Limit alcohol to one drink per day.    No smoking.     Wear sunscreen to prevent skin cancer.    See your dentist every six months for an exam and cleaning.

## 2021-07-09 ASSESSMENT — ANXIETY QUESTIONNAIRES: GAD7 TOTAL SCORE: 0

## 2021-07-10 LAB
COPATH REPORT: NORMAL
PAP: NORMAL

## 2021-07-21 NOTE — PROGRESS NOTES
"  IUD Insertion:  CONSULT:    Is a pregnancy test required: No, although one was performed in error.  Was a consent obtained?  Yes    Subjective: Yady Sellers is a 20 year old  presents for IUD and desires Mirena type IUD.    Patient has been given the opportunity to ask questions about all forms of birth control, including all options appropriate for Yady Sellers. Discussed that no method of birth control, except abstinence is 100% effective against pregnancy or sexually transmitted infection.     Yady Sellers understands she may have the IUD removed at any time. IUD should be removed by a health care provider.    The entire insertion procedure was reviewed with the patient, including care after placement.    Patient's last menstrual period was 2021. Last sexual activity: never. No allergy to betadine or shellfish.   hCG Urine Qualitative   Date Value Ref Range Status   2021 Negative Negative Final     Comment:     This test is for screening purposes.  Results should be interpreted along with the clinical picture.  Confirmation testing is available if warranted by ordering NFV139, HCG Quantitative Pregnancy.         /64   Ht 1.803 m (5' 11\")   Wt 76.7 kg (169 lb)   LMP 2021   Breastfeeding No   BMI 23.57 kg/m      Pelvic Exam:   EG/BUS: normal genital architecture without lesions, erythema or abnormal secretions.   Vagina: moist, pink, rugae with physiologic discharge and secretions  Cervix: Nuliparous no lesions and pink, moist, closed, without lesion or CMT  Uterus: mid position, mobile, no pain  Adnexa: within normal limits and no masses, nodularity, tenderness    PROCEDURE NOTE: -- IUD Insertion    Reason for Insertion: contraception    Premedicated with ibuprofen.  Under sterile technique, cervix was visualized with speculum and prepped with Betadine solution swab x 3. Tenaculum was placed for stability. The uterus was gently straightened and sounded to 7.0 cm. IUD " prepared for placement, and IUD inserted according to 's instructions without difficulty or significant resitance, and deployed at the fundus. The strings were visualized and trimmed to 3.0 cm from the external os. Tenaculum was removed and hemostasis noted. Speculum removed.  Patient tolerated procedure well.    Lot # [id number]  Exp: 10/2023  NDC: 66627-626-33      EBL: minimal    Complications: none    ASSESSMENT:     ICD-10-CM    1. Encounter for insertion of intrauterine contraceptive device  Z30.430 levonorgestrel (MIRENA) 20 MCG/24HR IUD     levonorgestrel (MIRENA) 20 MCG/24HR IUD 20 mcg     INSERTION INTRAUTERINE DEVICE     ibuprofen (ADVIL/MOTRIN) tablet 800 mg   2. Pre-procedure lab exam  Z01.812 HCG Qual, Urine ([id number])     CANCELED: HCG Qual, Urine ([id number])        PLAN:    Given 's handouts, including when to have IUD removed, list of danger s/sx, side effects and follow up recommended. Encouraged condom use for prevention of STD. Back up contraception advised for 7 days if progestin method. Advised to call for any fever, for prolonged or severe pain or bleeding, abnormal vaginal discharge, or unable to palpate strings. She was advised to use pain medications (ibuprofen) as needed for mild to moderate pain. Advised to follow-up in clinic in 4-6 weeks for IUD string check if unable to find strings or as directed by provider.     Kaitlyn Guzmán Masters, DO

## 2021-07-22 ENCOUNTER — OFFICE VISIT (OUTPATIENT)
Dept: OBGYN | Facility: CLINIC | Age: 21
End: 2021-07-22
Payer: COMMERCIAL

## 2021-07-22 VITALS
SYSTOLIC BLOOD PRESSURE: 122 MMHG | WEIGHT: 169 LBS | BODY MASS INDEX: 23.66 KG/M2 | HEIGHT: 71 IN | DIASTOLIC BLOOD PRESSURE: 64 MMHG

## 2021-07-22 DIAGNOSIS — Z30.430 ENCOUNTER FOR INSERTION OF MIRENA IUD: ICD-10-CM

## 2021-07-22 DIAGNOSIS — Z30.430 ENCOUNTER FOR INSERTION OF INTRAUTERINE CONTRACEPTIVE DEVICE: Primary | ICD-10-CM

## 2021-07-22 DIAGNOSIS — Z01.812 PRE-PROCEDURE LAB EXAM: ICD-10-CM

## 2021-07-22 LAB — HCG UR QL: NEGATIVE

## 2021-07-22 PROCEDURE — 81025 URINE PREGNANCY TEST: CPT | Performed by: OBSTETRICS & GYNECOLOGY

## 2021-07-22 PROCEDURE — 58300 INSERT INTRAUTERINE DEVICE: CPT | Performed by: OBSTETRICS & GYNECOLOGY

## 2021-07-22 RX ORDER — IBUPROFEN 400 MG/1
800 TABLET, FILM COATED ORAL ONCE
Status: COMPLETED | OUTPATIENT
Start: 2021-07-22 | End: 2021-07-22

## 2021-07-22 RX ADMIN — IBUPROFEN 800 MG: 400 TABLET, FILM COATED ORAL at 16:22

## 2021-07-22 ASSESSMENT — MIFFLIN-ST. JEOR: SCORE: 1627.71

## 2021-09-18 ENCOUNTER — HEALTH MAINTENANCE LETTER (OUTPATIENT)
Age: 21
End: 2021-09-18

## 2021-10-06 ENCOUNTER — VIRTUAL VISIT (OUTPATIENT)
Dept: FAMILY MEDICINE | Facility: CLINIC | Age: 21
End: 2021-10-06
Payer: COMMERCIAL

## 2021-10-06 DIAGNOSIS — F43.22 ADJUSTMENT DISORDER WITH ANXIOUS MOOD: ICD-10-CM

## 2021-10-06 PROCEDURE — 96127 BRIEF EMOTIONAL/BEHAV ASSMT: CPT | Mod: 95 | Performed by: FAMILY MEDICINE

## 2021-10-06 PROCEDURE — 99214 OFFICE O/P EST MOD 30 MIN: CPT | Mod: 95 | Performed by: FAMILY MEDICINE

## 2021-10-06 RX ORDER — ESCITALOPRAM OXALATE 20 MG/1
20 TABLET ORAL DAILY
Qty: 120 TABLET | Refills: 2 | Status: SHIPPED | OUTPATIENT
Start: 2021-10-06 | End: 2021-12-13

## 2021-10-06 ASSESSMENT — ANXIETY QUESTIONNAIRES
GAD7 TOTAL SCORE: 2
5. BEING SO RESTLESS THAT IT IS HARD TO SIT STILL: NOT AT ALL
8. IF YOU CHECKED OFF ANY PROBLEMS, HOW DIFFICULT HAVE THESE MADE IT FOR YOU TO DO YOUR WORK, TAKE CARE OF THINGS AT HOME, OR GET ALONG WITH OTHER PEOPLE?: NOT DIFFICULT AT ALL
GAD7 TOTAL SCORE: 2
4. TROUBLE RELAXING: NOT AT ALL
1. FEELING NERVOUS, ANXIOUS, OR ON EDGE: SEVERAL DAYS
2. NOT BEING ABLE TO STOP OR CONTROL WORRYING: NOT AT ALL
7. FEELING AFRAID AS IF SOMETHING AWFUL MIGHT HAPPEN: NOT AT ALL
3. WORRYING TOO MUCH ABOUT DIFFERENT THINGS: NOT AT ALL
GAD7 TOTAL SCORE: 2
6. BECOMING EASILY ANNOYED OR IRRITABLE: SEVERAL DAYS
7. FEELING AFRAID AS IF SOMETHING AWFUL MIGHT HAPPEN: NOT AT ALL

## 2021-10-06 ASSESSMENT — PATIENT HEALTH QUESTIONNAIRE - PHQ9
10. IF YOU CHECKED OFF ANY PROBLEMS, HOW DIFFICULT HAVE THESE PROBLEMS MADE IT FOR YOU TO DO YOUR WORK, TAKE CARE OF THINGS AT HOME, OR GET ALONG WITH OTHER PEOPLE: SOMEWHAT DIFFICULT
SUM OF ALL RESPONSES TO PHQ QUESTIONS 1-9: 4
SUM OF ALL RESPONSES TO PHQ QUESTIONS 1-9: 4

## 2021-10-06 NOTE — PROGRESS NOTES
ROLLY-7 SCORE 2/9/2021 7/8/2021 10/6/2021   Total Score - - 2 (minimal anxiety)   Total Score 1 0 2     PHQ 2/9/2021 7/8/2021 10/6/2021   PHQ-9 Total Score 3 3 4   Q9: Thoughts of better off dead/self-harm past 2 weeks Not at all Not at all Not at all   20 year old female with known history of anxiety and medications follow up   (F43.22) Adjustment disorder with anxious mood  Plan: escitalopram (LEXAPRO) 20 MG tablet- ok to reduce 10 mg once daily   But if recurring symptoms to go right back on 20 mg once daily  Refill given for the whole yr    Video Start Time:  6:04pm  Video End time  6:10    Would like to decrease the dose  Has been on lexapro since 08/2020  Therapy helped in past  Last few session were not that helpful    O/E- vital not taken due to VV  GENERAL: Healthy, alert and no distress  EYES: Eyes grossly normal to inspection.  No discharge or erythema, or obvious scleral/conjunctival abnormalities.  RESP: No audible wheeze, cough, or visible cyanosis.  No visible retractions or increased work of breathing.    SKIN: Visible skin clear. No significant rash, abnormal pigmentation or lesions.  NEURO: Cranial nerves grossly intact.  Mentation and speech appropriate for age.  PSYCH: Mentation appears normal, affect normal/bright, judgement and insight intact, normal speech and appearance well-groomed.        Answers for HPI/ROS submitted by the patient on 10/6/2021  If you checked off any problems, how difficult have these problems made it for you to do your work, take care of things at home, or get along with other people?: Somewhat difficult  PHQ9 TOTAL SCORE: 4  ROLLY 7 TOTAL SCORE: 2

## 2021-10-07 ASSESSMENT — PATIENT HEALTH QUESTIONNAIRE - PHQ9: SUM OF ALL RESPONSES TO PHQ QUESTIONS 1-9: 4

## 2021-10-07 ASSESSMENT — ANXIETY QUESTIONNAIRES: GAD7 TOTAL SCORE: 2

## 2021-12-03 ENCOUNTER — TELEPHONE (OUTPATIENT)
Dept: FAMILY MEDICINE | Facility: CLINIC | Age: 21
End: 2021-12-03
Payer: COMMERCIAL

## 2021-12-03 NOTE — TELEPHONE ENCOUNTER
Mother calling on behalf patient.  Patient is having increased anxiety symptoms and is wondering if she should increase dosage of Lexapro.  Patient currently in [place] finishing finals and will be back on 12/11. Helped schedule virtual visit for f/u for after patient returns home.    Please call patient back to discuss increase of medication. Also can reach out to on [employer]

## 2021-12-09 NOTE — PROGRESS NOTES
Yady is a 21 year old who is being evaluated via a billable video visit.      How would you like to obtain your AVS? Radha  If the video visit is dropped, the invitation should be resent by: Radha or else Text to cell phone: [phone]  Will anyone else be joining your video visit? No      Video Start Time: 4:19 PM    Assessment & Plan     Adjustment disorder with mixed anxiety and depressed mood    - EMOTIONAL / BEHAVIORAL ASSESSMENT  - busPIRone (BUSPAR) 10 MG tablet; Take 1 tablet (10 mg) by mouth 2 times daily  - buPROPion (WELLBUTRIN XL) 150 MG 24 hr tablet; Take 1 tablet (150 mg) by mouth every morning    Potential medication side effects were discussed with the patient; let me know if any occur.      1. Adjustment disorder with mixed anxiety and depressed mood    - busPIRone (BUSPAR) 10 MG tablet; more for anxiety  You can take full or 1/2 up to twice daily   Take 1 tablet (10 mg) by mouth 2 times daily  Dispense: 60 tablet; Refill: 3  - buPROPion (WELLBUTRIN XL) 150 MG 24 hr tablet; more for depression Take 1 tablet (150 mg) by mouth every morning  Dispense: 30 tablet; Refill: 3    Taper down lexparo 10 mg once daily - that's 1/2 tab for next 1-2 weeks and then everyother day     Follow up in 3-4 weeks    Safety plan was reviewed; to the ER as needed or call after hours crisis line; [phone]  Sucide prevention life line [phone]  Community out reach for psych emergencies [phone].  Education and counseling was done regarding use of medications, psychotherapy options.        Prescription drug management  34 minutes spent on the date of the encounter doing chart review, history and exam, documentation and further activities per the note       Depression Screening Follow Up    PHQ 12/13/2021   PHQ-9 Total Score 10   Q9: Thoughts of better off dead/self-harm past 2 weeks Not at all   F/U: Thoughts of suicide or self-harm Yes   F/U: Self harm-plan Yes   F/U: Self-harm action Yes   F/U: Safety  concerns No           No flowsheet data found.      Follow Up    Follow Up Actions Taken  Crisis resource information provided in the After Visit Summary    Discussed the following ways the patient can remain in a safe environment:  be around others  Depression Screening Follow Up    PHQ 12/13/2021   PHQ-9 Total Score 10   Q9: Thoughts of better off dead/self-harm past 2 weeks Not at all   F/U: Thoughts of suicide or self-harm Yes   F/U: Self harm-plan Yes   F/U: Self-harm action Yes   F/U: Safety concerns No         Follow Up Actions Taken  Crisis resource information provided in After Visit Summary     See Patient Instructions    Return in about 22 days (around 1/4/2022).    Adriana Jimenez MD  [facility]    Rolando Conteh is a 21 year old who presents for the following health issues     History of Present Illness       Mental Health Follow-up:  Patient presents to follow-up on Depression & Anxiety.Patient's depression since last visit has been:  Medium  The patient is not having other symptoms associated with depression.  Patient's anxiety since last visit has been:  Medium  The patient is not having other symptoms associated with anxiety.  Any significant life events: No  Patient is feeling anxious or having panic attacks.  Patient has no concerns about alcohol or drug use.     Social History  Tobacco Use    Smoking status: Never Smoker    Smokeless tobacco: Never Used  Alcohol use: Yes  Drug use: Never      Today's PHQ-9         PHQ-9 Total Score:     11   PHQ-9 Q9 Thoughts of better off dead/self-harm past 2 weeks :   Several days   Thoughts of suicide or self harm:  (P) Yes   Self-harm Plan:    (P) Yes   Self-harm Action:      (P) Yes   Safety concerns for self or others: (P) No         conflicts of clubs- that added to stress at school  Got overwhelmed. At college.  There is still a conflict coming up and feels its not in her hands.  She is here for the winter break till jan  18th  Self cutting with razor last week  therapist weekly    Answers for HPI/ROS submitted by the patient on 12/13/2021  If you checked off any problems, how difficult have these problems made it for you to do your work, take care of things at home, or get along with other people?: Very difficult  PHQ9 TOTAL SCORE: 11  ROLLY 7 TOTAL SCORE: 9        Review of Systems   Constitutional, HEENT, cardiovascular, pulmonary, GI, , musculoskeletal, neuro, skin, endocrine and psych systems are negative, except as otherwise noted.      Objective           Vitals:  No vitals were obtained today due to virtual visit.    Physical Exam   GENERAL: Healthy, alert and no distress  EYES: Eyes grossly normal to inspection.  No discharge or erythema, or obvious scleral/conjunctival abnormalities.  RESP: No audible wheeze, cough, or visible cyanosis.  No visible retractions or increased work of breathing.    SKIN: Visible skin clear. No significant rash, abnormal pigmentation or lesions.  NEURO: Cranial nerves grossly intact.  Mentation and speech appropriate for age.  PSYCH: Mentation appears normal, affect normal/bright, judgement and insight intact, normal speech and appearance well-groomed.  PHQ 7/8/2021 10/6/2021 12/13/2021   PHQ-9 Total Score 3 4 10   Q9: Thoughts of better off dead/self-harm past 2 weeks Not at all Not at all Not at all   F/U: Thoughts of suicide or self-harm - - Yes   F/U: Self harm-plan - - Yes   F/U: Self-harm action - - Yes   F/U: Safety concerns - - No     ROLLY-7 SCORE 7/8/2021 10/6/2021 12/13/2021   Total Score - 2 (minimal anxiety) 9 (mild anxiety)   Total Score 0 2 9                   Video-Visit Details    Type of service:  Video Visit    Video End Time:4:42 PM    Originating Location (pt. Location): Home    Distant Location (provider location):  [facility]     Platform used for Video Visit: [employer]

## 2021-12-13 ENCOUNTER — VIRTUAL VISIT (OUTPATIENT)
Dept: FAMILY MEDICINE | Facility: CLINIC | Age: 21
End: 2021-12-13
Payer: COMMERCIAL

## 2021-12-13 DIAGNOSIS — F43.23 ADJUSTMENT DISORDER WITH MIXED ANXIETY AND DEPRESSED MOOD: Primary | ICD-10-CM

## 2021-12-13 PROCEDURE — 99214 OFFICE O/P EST MOD 30 MIN: CPT | Mod: 95 | Performed by: FAMILY MEDICINE

## 2021-12-13 PROCEDURE — 96127 BRIEF EMOTIONAL/BEHAV ASSMT: CPT | Mod: 95 | Performed by: FAMILY MEDICINE

## 2021-12-13 RX ORDER — BUPROPION HYDROCHLORIDE 150 MG/1
150 TABLET ORAL EVERY MORNING
Qty: 30 TABLET | Refills: 3 | Status: SHIPPED | OUTPATIENT
Start: 2021-12-13 | End: 2022-02-21

## 2021-12-13 RX ORDER — BUSPIRONE HYDROCHLORIDE 10 MG/1
10 TABLET ORAL 2 TIMES DAILY
Qty: 60 TABLET | Refills: 3 | Status: SHIPPED | OUTPATIENT
Start: 2021-12-13 | End: 2022-06-21

## 2021-12-13 ASSESSMENT — ANXIETY QUESTIONNAIRES
2. NOT BEING ABLE TO STOP OR CONTROL WORRYING: SEVERAL DAYS
GAD7 TOTAL SCORE: 9
1. FEELING NERVOUS, ANXIOUS, OR ON EDGE: MORE THAN HALF THE DAYS
5. BEING SO RESTLESS THAT IT IS HARD TO SIT STILL: SEVERAL DAYS
3. WORRYING TOO MUCH ABOUT DIFFERENT THINGS: MORE THAN HALF THE DAYS
GAD7 TOTAL SCORE: 9
6. BECOMING EASILY ANNOYED OR IRRITABLE: SEVERAL DAYS
7. FEELING AFRAID AS IF SOMETHING AWFUL MIGHT HAPPEN: NOT AT ALL
GAD7 TOTAL SCORE: 9
7. FEELING AFRAID AS IF SOMETHING AWFUL MIGHT HAPPEN: NOT AT ALL
4. TROUBLE RELAXING: MORE THAN HALF THE DAYS

## 2021-12-13 ASSESSMENT — PATIENT HEALTH QUESTIONNAIRE - PHQ9
SUM OF ALL RESPONSES TO PHQ QUESTIONS 1-9: 11
10. IF YOU CHECKED OFF ANY PROBLEMS, HOW DIFFICULT HAVE THESE PROBLEMS MADE IT FOR YOU TO DO YOUR WORK, TAKE CARE OF THINGS AT HOME, OR GET ALONG WITH OTHER PEOPLE: VERY DIFFICULT

## 2021-12-13 NOTE — PATIENT INSTRUCTIONS
1. Adjustment disorder with mixed anxiety and depressed mood    - busPIRone (BUSPAR) 10 MG tablet; more for anxiety  You can take full or 1/2 up to twice daily   Take 1 tablet (10 mg) by mouth 2 times daily  Dispense: 60 tablet; Refill: 3  - buPROPion (WELLBUTRIN XL) 150 MG 24 hr tablet; more for depression Take 1 tablet (150 mg) by mouth every morning  Dispense: 30 tablet; Refill: 3    Taper down lexparo 10 mg once daily - that's 1/2 tab for next 1-2 weeks and then everyother day     Follow up in 3-4 weeks    Safety plan was reviewed; to the ER as needed or call after hours crisis line; [phone]  Sucide prevention life line [phone]  Community out reach for psych emergencies [phone].  Education and counseling was done regarding use of medications, psychotherapy options.

## 2021-12-14 ASSESSMENT — ANXIETY QUESTIONNAIRES: GAD7 TOTAL SCORE: 9

## 2022-01-06 DIAGNOSIS — F43.22 ADJUSTMENT DISORDER WITH ANXIOUS MOOD: ICD-10-CM

## 2022-01-06 DIAGNOSIS — J30.81 CHRONIC ALLERGIC RHINITIS DUE TO ANIMAL HAIR AND DANDER: ICD-10-CM

## 2022-01-06 NOTE — TELEPHONE ENCOUNTER
Meds not on active med list  Left message for patient to call [facility] back  When patient calls back please transfer to BAMBI GUNN RN

## 2022-01-07 RX ORDER — ESCITALOPRAM OXALATE 20 MG/1
TABLET ORAL
Qty: 30 TABLET | Refills: 1 | OUTPATIENT
Start: 2022-01-07

## 2022-01-07 RX ORDER — ALBUTEROL SULFATE 90 UG/1
AEROSOL, METERED RESPIRATORY (INHALATION)
Qty: 8.5 G | OUTPATIENT
Start: 2022-01-07

## 2022-01-10 DIAGNOSIS — J45.30 MILD PERSISTENT ASTHMA WITHOUT COMPLICATION: ICD-10-CM

## 2022-01-11 RX ORDER — ALBUTEROL SULFATE 90 UG/1
2 AEROSOL, METERED RESPIRATORY (INHALATION) EVERY 6 HOURS PRN
Qty: 6.7 G | Refills: 0 | Status: SHIPPED | OUTPATIENT
Start: 2022-01-11 | End: 2024-01-11

## 2022-01-11 RX ORDER — ESCITALOPRAM OXALATE 10 MG/1
10 TABLET ORAL DAILY
Qty: 30 TABLET | Refills: 1 | Status: SHIPPED | OUTPATIENT
Start: 2022-01-11 | End: 2022-06-21

## 2022-01-11 NOTE — TELEPHONE ENCOUNTER
Dr. Jimenez,     Pt called back regarding refills -     Pt states she hasn't refilled albuterol inhaler in a long time - states she hasn't refilled since  (has since fallen off of med list). Her inhaler is now  - denies any worsening asthma symptoms, states she just needs new inhaler to have on hand in case of emergency.    As for escitalopram, pt has been tapering down on her dose per Dr. Jimenez's instruction but states she needs 1-2 more months supply of escitalopram 10mg before stopping completely (was previously on 20mg dose).    Meds cued.    Routing refill request to provider for review/approval because:  Drug not active on patient's medication list    Shaunna Montiel RN  [facility]

## 2022-01-12 NOTE — TELEPHONE ENCOUNTER
I have refilled medications  She does need updated questionnaires and or at least TE appointment to follow up on depression and a anxiety  Thanks     PHQ 7/8/2021 10/6/2021 12/13/2021   PHQ-9 Total Score 3 4 10   Q9: Thoughts of better off dead/self-harm past 2 weeks Not at all Not at all Not at all   F/U: Thoughts of suicide or self-harm - - Yes   F/U: Self harm-plan - - Yes   F/U: Self-harm action - - Yes   F/U: Safety concerns - - No     ROLLY-7 SCORE 7/8/2021 10/6/2021 12/13/2021   Total Score - 2 (minimal anxiety) 9 (mild anxiety)   Total Score 0 2 9

## 2022-01-17 RX ORDER — FLUTICASONE PROPIONATE 220 UG/1
1 AEROSOL, METERED RESPIRATORY (INHALATION) 2 TIMES DAILY
Qty: 12 G | Refills: 0 | Status: SHIPPED | OUTPATIENT
Start: 2022-01-17 | End: 2022-06-21

## 2022-01-17 NOTE — TELEPHONE ENCOUNTER
Routing old refill request to PCP to advise  Patient has not responded to our outreach - refill over 48 hours old  Thanks,  Therese GUNN RN

## 2022-02-25 ENCOUNTER — NURSE TRIAGE (OUTPATIENT)
Dept: NURSING | Facility: CLINIC | Age: 22
End: 2022-02-25
Payer: COMMERCIAL

## 2022-02-25 NOTE — TELEPHONE ENCOUNTER
Mother calling with patient on other line.  Patient tested positive for Covid via home test and is asking about monoclonal antibodies.  RN referred patient to MNRAP website.  Other potential treatments via PCP.    Disposition also recommends patient call PCP due to patient having asthma and high risk.  RN will route message to PCP.    Alexandra Cook RN  [facility]    COVID 19 Nurse Triage Plan/Patient Instructions    Please be aware that novel coronavirus (COVID-19) may be circulating in the community. If you develop symptoms such as fever, cough, or SOB or if you have concerns about the presence of another infection including coronavirus (COVID-19), please contact your health care provider or visit [email or web address].     Disposition/Instructions    Home care recommended. Follow home care protocol based instructions.    Thank you for taking steps to prevent the spread of this virus.  o Limit your contact with others.  o Wear a simple mask to cover your cough.  o Wash your hands well and often.    Resources    [facility]: About COVID-19: [email or web address]    CDC: What to Do If You're Sick: www.cdc.gov/coronavirus/2019-ncov/about/steps-when-sick.html    CDC: Ending Home Isolation: www.cdc.gov/coronavirus/2019-ncov/hcp/disposition-in-home-patients.html     CDC: Caring for Someone: www.cdc.gov/coronavirus/2019-ncov/if-you-are-sick/care-for-someone.html     [facility]: Interim Guidance for Hospital Discharge to Home: www.health.[facility].mn.us/diseases/coronavirus/hcp/hospdischarge.pdf    [facility] clinical trials (COVID-19 research studies): [email or web address]     Below are the COVID-19 hotlines at the [facility] ([facility]). Interpreters are available.   o For health questions: Call [phone] or 1-768.139.1280 (7 a.m. to 7 p.m.)  o For questions about schools and childcare: Call [phone] or 1-416.149.1295 (7 a.m. to 7 p.m.)      Reason for Disposition    HIGH RISK for severe COVID complications (e.g., age > 64 years, obesity with BMI > 25, pregnant, chronic lung disease or other chronic medical condition)  (Exception: Already seen by PCP and no new or worsening symptoms.)    Additional Information    Negative: SEVERE difficulty breathing (e.g., struggling for each breath, speaks in single words)    Negative: Difficult to awaken or acting confused (e.g., disoriented, slurred speech)    Negative: Bluish (or gray) lips or face now    Negative: Shock suspected (e.g., cold/pale/clammy skin, too weak to stand, low BP, rapid pulse)    Negative: Sounds like a life-threatening emergency to the triager    Negative: SEVERE or constant chest pain or pressure (Exception: mild central chest pain, present only when coughing)    Negative: MODERATE difficulty breathing (e.g., speaks in phrases, SOB even at rest, pulse 100-120)    Negative: [1] Headache AND [2] stiff neck (can't touch chin to chest)    Negative: MILD difficulty breathing (e.g., minimal/no SOB at rest, SOB with walking, pulse <100)    Negative: Chest pain or pressure    Negative: Patient sounds very sick or weak to the triager    Negative: Fever > 103 F (39.4 C)    Negative: [1] Fever > 101 F (38.3 C) AND [2] age > 60 years    Negative: [1] Fever > 100.0 F (37.8 C) AND [2] bedridden (e.g., nursing home patient, CVA, chronic illness, recovering from surgery)    Protocols used: CORONAVIRUS (COVID-19) DIAGNOSED OR [id number] 8.25.2021

## 2022-02-25 NOTE — TELEPHONE ENCOUNTER
Advised patient log into MyChart -   On front page, treatment options is a button - questions will be asked to let patient know what treatment options she qualifies for and get scheduled   Therese GUNN RN

## 2022-04-30 ENCOUNTER — HEALTH MAINTENANCE LETTER (OUTPATIENT)
Age: 22
End: 2022-04-30

## 2022-06-20 DIAGNOSIS — J45.30 MILD PERSISTENT ASTHMA WITHOUT COMPLICATION: ICD-10-CM

## 2022-06-20 DIAGNOSIS — F43.23 ADJUSTMENT DISORDER WITH MIXED ANXIETY AND DEPRESSED MOOD: ICD-10-CM

## 2022-06-21 ENCOUNTER — VIRTUAL VISIT (OUTPATIENT)
Dept: FAMILY MEDICINE | Facility: CLINIC | Age: 22
End: 2022-06-21
Payer: COMMERCIAL

## 2022-06-21 DIAGNOSIS — J45.30 MILD PERSISTENT ASTHMA WITHOUT COMPLICATION: ICD-10-CM

## 2022-06-21 DIAGNOSIS — F43.23 ADJUSTMENT DISORDER WITH MIXED ANXIETY AND DEPRESSED MOOD: ICD-10-CM

## 2022-06-21 PROCEDURE — 96127 BRIEF EMOTIONAL/BEHAV ASSMT: CPT | Mod: 95 | Performed by: FAMILY MEDICINE

## 2022-06-21 PROCEDURE — 99214 OFFICE O/P EST MOD 30 MIN: CPT | Mod: 95 | Performed by: FAMILY MEDICINE

## 2022-06-21 RX ORDER — FLUOXETINE 10 MG/1
10 CAPSULE ORAL DAILY
Qty: 30 CAPSULE | Refills: 11 | Status: SHIPPED | OUTPATIENT
Start: 2022-06-21 | End: 2022-08-08

## 2022-06-21 RX ORDER — BUPROPION HYDROCHLORIDE 150 MG/1
TABLET ORAL
Qty: 1 TABLET | Refills: 0 | OUTPATIENT
Start: 2022-06-21

## 2022-06-21 RX ORDER — FLUTICASONE PROPIONATE 220 UG/1
1 AEROSOL, METERED RESPIRATORY (INHALATION) 2 TIMES DAILY
Qty: 12 G | Refills: 11 | Status: SHIPPED | OUTPATIENT
Start: 2022-06-21 | End: 2022-11-01

## 2022-06-21 RX ORDER — BUPROPION HYDROCHLORIDE 150 MG/1
TABLET ORAL
Qty: 30 TABLET | Refills: 11 | Status: SHIPPED | OUTPATIENT
Start: 2022-06-21 | End: 2022-09-13

## 2022-06-21 RX ORDER — FLUTICASONE PROPIONATE 220 UG/1
AEROSOL, METERED RESPIRATORY (INHALATION)
Qty: 1 G | Refills: 0 | OUTPATIENT
Start: 2022-06-21

## 2022-06-21 ASSESSMENT — ASTHMA QUESTIONNAIRES: ACT_TOTALSCORE: 24

## 2022-06-21 ASSESSMENT — ANXIETY QUESTIONNAIRES
1. FEELING NERVOUS, ANXIOUS, OR ON EDGE: NEARLY EVERY DAY
GAD7 TOTAL SCORE: 17
4. TROUBLE RELAXING: NEARLY EVERY DAY
GAD7 TOTAL SCORE: 17
7. FEELING AFRAID AS IF SOMETHING AWFUL MIGHT HAPPEN: MORE THAN HALF THE DAYS
2. NOT BEING ABLE TO STOP OR CONTROL WORRYING: MORE THAN HALF THE DAYS
8. IF YOU CHECKED OFF ANY PROBLEMS, HOW DIFFICULT HAVE THESE MADE IT FOR YOU TO DO YOUR WORK, TAKE CARE OF THINGS AT HOME, OR GET ALONG WITH OTHER PEOPLE?: SOMEWHAT DIFFICULT
7. FEELING AFRAID AS IF SOMETHING AWFUL MIGHT HAPPEN: MORE THAN HALF THE DAYS
6. BECOMING EASILY ANNOYED OR IRRITABLE: MORE THAN HALF THE DAYS
3. WORRYING TOO MUCH ABOUT DIFFERENT THINGS: MORE THAN HALF THE DAYS
GAD7 TOTAL SCORE: 17
5. BEING SO RESTLESS THAT IT IS HARD TO SIT STILL: NEARLY EVERY DAY

## 2022-06-21 ASSESSMENT — PATIENT HEALTH QUESTIONNAIRE - PHQ9
SUM OF ALL RESPONSES TO PHQ QUESTIONS 1-9: 7
10. IF YOU CHECKED OFF ANY PROBLEMS, HOW DIFFICULT HAVE THESE PROBLEMS MADE IT FOR YOU TO DO YOUR WORK, TAKE CARE OF THINGS AT HOME, OR GET ALONG WITH OTHER PEOPLE: SOMEWHAT DIFFICULT
SUM OF ALL RESPONSES TO PHQ QUESTIONS 1-9: 7

## 2022-06-21 NOTE — PROGRESS NOTES
Yady is a 21 year old who is being evaluated via a billable video visit.      How would you like to obtain your AVS? MyChart  If the video visit is dropped, the invitation should be resent by: Send to e-mail at: [email or web address]  Will anyone else be joining your video visit? No          Assessment & Plan   21 year old female , known history of depression and anxiety- follow up for medications     Depression, recurrent  Anxiety moderate    Comment : Bupropion has helped with depression.  Anxiety moderate, combination factor including situational stress. Previously did not like Lexapro, felt monotone.  Buspirone tried only once or twice and did not like it..    Willing to try another SSRI.  Plan:Start new selective serotonin reuptake inhibitor  - FLUoxetine (PROZAC) 10 MG capsule; Take 1 capsule (10 mg) by mouth daily  Dispense: 30 capsule; Refill: 11  Continue same - - buPROPion (WELLBUTRIN XL) 150 MG 24 hr tablet; TAKE 1 TABLET(150 MG) BY MOUTH EVERY MORNING  Dispense: 30 tablet; Refill: 11    Get routine exercise, self-care, sleep hygiene also discussed.  She does get weekly therapy, advised her to consider more specific cognitive behavioral therapy, DBT.    We also discussed benefit of medication management team referral.  - Med Therapy Management Referral  Follow up in 2-4 weeks  Follow up complete physical august  Follow up as needed  earlier for mood or medications  ROLLY-7 SCORE 10/6/2021 12/13/2021 6/21/2022   Total Score 2 (minimal anxiety) 9 (mild anxiety) 17 (severe anxiety)   Total Score 2 9 17     PHQ 10/6/2021 12/13/2021 6/21/2022   PHQ-9 Total Score 4 10 7   Q9: Thoughts of better off dead/self-harm past 2 weeks Not at all Not at all Not at all   F/U: Thoughts of suicide or self-harm - Yes -   F/U: Self harm-plan - Yes -   F/U: Self-harm action - Yes -   F/U: Safety concerns - No -       2. Mild persistent asthma without complication.AAP/ACT reviewed.  Well controlled  Refill given for 1 yr  -  "fluticasone (FLOVENT HFA) 220 MCG/ACT inhaler; Inhale 1 puff into the lungs 2 times daily  Dispense: 12 g; Refill: 11    Potential medication side effects were discussed with the patient; let me know if any occur.    Potential medication side effects were discussed with the patient; let me know if any occur.     Prescription drug management  38 minutes spent on the date of the encounter doing chart review, history and exam, documentation and further activities per the note           Return in about 4 weeks (around 7/19/2022) for concerns,unresolved, virtual/video visit, mood.    Adriana Jimenez MD  [facility]    Rolando Conteh is a 21 year old presenting for the following health issues:  Depression and Anxiety    Graduated spring 2022.  Had plans to take MCAT- unsure right now when.  Moved back from college to home.  She reports that the \"perception of higher gun violence in [place],\" is causing more anxiety , inability to concentrate and sleep is affected as well.  She has a therapist- weekly, VV.  No scheduled routine at home- trying to bike to go study somewhere a few days a week.  Has difficulty falling asleep- without use of caffeine,does endorse more screen time in evening and night    HPI     Depression and Anxiety Follow-Up    How are you doing with your depression since your last visit? Improved     How are you doing with your anxiety since your last visit?  Worsened     Are you having other symptoms that might be associated with depression or anxiety? Yes:  Difficulty concentrating, difficulty sleeping    Have you had a significant life event? OTHER: recently moved back home from college     Do you have any concerns with your use of alcohol or other drugs? No    Social History     Tobacco Use     Smoking status: Never Smoker     Smokeless tobacco: Never Used   Vaping Use     Vaping Use: Never used   Substance Use Topics     Alcohol use: Yes     Drug use: Never     PHQ " 10/6/2021 12/13/2021 6/21/2022   PHQ-9 Total Score 4 10 7   Q9: Thoughts of better off dead/self-harm past 2 weeks Not at all Not at all Not at all   F/U: Thoughts of suicide or self-harm - Yes -   F/U: Self harm-plan - Yes -   F/U: Self-harm action - Yes -   F/U: Safety concerns - No -     ROLLY-7 SCORE 10/6/2021 12/13/2021 6/21/2022   Total Score 2 (minimal anxiety) 9 (mild anxiety) 17 (severe anxiety)   Total Score 2 9 17         Suicide Assessment Five-step Evaluation and Treatment (SAFE-T)      How many servings of fruits and vegetables do you eat daily?  4 or more    On average, how many sweetened beverages do you drink each day (Examples: soda, juice, sweet tea, etc.  Do NOT count diet or artificially sweetened beverages)?   1    How many days per week do you exercise enough to make your heart beat faster? 4    How many minutes a day do you exercise enough to make your heart beat faster? 30 - 60  How many days per week do you miss taking your medication? 1    What makes it hard for you to take your medications?  remembering to take        Review of Systems   Constitutional, HEENT, cardiovascular, pulmonary, GI, , musculoskeletal, neuro, skin, endocrine and psych systems are negative, except as otherwise noted.      Objective           Vitals:  No vitals were obtained today due to virtual visit.    Physical Exam   GENERAL: Healthy, alert and no distress  EYES: Eyes grossly normal to inspection.  No discharge or erythema, or obvious scleral/conjunctival abnormalities.  RESP: No audible wheeze, cough, or visible cyanosis.  No visible retractions or increased work of breathing.    SKIN: Visible skin clear. No significant rash, abnormal pigmentation or lesions.  NEURO: Cranial nerves grossly intact.  Mentation and speech appropriate for age.  PSYCH: Mentation appears normal, affect normal/bright, judgement and insight intact, normal speech and appearance well-groomed.            Video-Visit Details    Video Start  Time: 5:55 PM    Type of service:  Video Visit    Video End Time:6:25 PM    Originating Location (pt. Location): Home    Distant Location (provider location):  [facility]     Platform used for Video Visit: Trisha Tello

## 2022-06-21 NOTE — PATIENT INSTRUCTIONS
1. Adjustment disorder with mixed anxiety and depressed mood  ROLLY-7 SCORE 10/6/2021 12/13/2021 6/21/2022   Total Score 2 (minimal anxiety) 9 (mild anxiety) 17 (severe anxiety)   Total Score 2 9 17      Get routine excercise.  Counseling, DBT, CBT      Start new selective serotonin reuptake inhibitor  - FLUoxetine (PROZAC) 10 MG capsule; Take 1 capsule (10 mg) by mouth daily  Dispense: 30 capsule; Refill: 11  Continue same - - buPROPion (WELLBUTRIN XL) 150 MG 24 hr tablet; TAKE 1 TABLET(150 MG) BY MOUTH EVERY MORNING  Dispense: 30 tablet; Refill: 11  - Med Therapy Management Referral  Follow up in 2-4 weeks  Follow up complete physical august  Follow up as needed  earlier for mood or medications      2. Mild persistent asthma without complication  Well controlled  Refill given for 1 yr  - fluticasone (FLOVENT HFA) 220 MCG/ACT inhaler; Inhale 1 puff into the lungs 2 times daily  Dispense: 12 g; Refill: 11

## 2022-06-21 NOTE — TELEPHONE ENCOUNTER
Note from 12/13/21 VV:  Follow up in 3-4 weeks    Patient has VV scheduled with A.S. today at 5:20 pm  Refills will be addressed then.    Samantha García RN

## 2022-06-23 ENCOUNTER — TELEPHONE (OUTPATIENT)
Dept: FAMILY MEDICINE | Facility: CLINIC | Age: 22
End: 2022-06-23

## 2022-06-23 NOTE — TELEPHONE ENCOUNTER
MTM referral from: [facility] visit (referral by provider)    MTM referral outreach attempt #2 on June 23, 2022 at 10:03 AM      Outcome: Patient not reachable after several attempts, will route to MTM Pharmacist/Provider as an FYI.  MT scheduling number is [phone].  Thank you for the referral.    Jake Saab, MTM coordinator

## 2022-06-30 NOTE — PROGRESS NOTES
"Yady is a 21 year old who is being evaluated via a billable video visit.      How would you like to obtain your AVS? {AVS Preference:243575}  If the video visit is dropped, the invitation should be resent by: {video visit invitation (Optional) :063392}  Will anyone else be joining your video visit? {:487377}  {If patient encounters technical issues they should call [phone] :936603}        {PROVIDER CHARTING PREFERENCE:302019}    Subjective   Yady is a 21 year old{ACCOMPANIED BY STATEMENT (Optional):815761}, presenting for the following health issues:  No chief complaint on file.      HPI     Depression and Anxiety Follow-Up    How are you doing with your depression since your last visit? { :905489::\"No change\"}    How are you doing with your anxiety since your last visit?  { :137891::\"No change\"}    Are you having other symptoms that might be associated with depression or anxiety? { :212855}    Have you had a significant life event? { :833861}     Do you have any concerns with your use of alcohol or other drugs? { :032951}    Social History     Tobacco Use     Smoking status: Never Smoker     Smokeless tobacco: Never Used   Vaping Use     Vaping Use: Never used   Substance Use Topics     Alcohol use: Yes     Drug use: Never     PHQ 10/6/2021 12/13/2021 6/21/2022   PHQ-9 Total Score 4 10 7   Q9: Thoughts of better off dead/self-harm past 2 weeks Not at all Not at all Not at all   F/U: Thoughts of suicide or self-harm - Yes -   F/U: Self harm-plan - Yes -   F/U: Self-harm action - Yes -   F/U: Safety concerns - No -     ROLLY-7 SCORE 10/6/2021 12/13/2021 6/21/2022   Total Score 2 (minimal anxiety) 9 (mild anxiety) 17 (severe anxiety)   Total Score 2 9 17     {Last PHQ9 or GAD7 Responses (Optional):026735}    Suicide Assessment Five-step Evaluation and Treatment (SAFE-T)  {Provider  Link to Depression Care Package SmartSet :268006}    How many servings of fruits and vegetables do you eat daily?  { :007017}    On " "average, how many sweetened beverages do you drink each day (Examples: soda, juice, sweet tea, etc.  Do NOT count diet or artificially sweetened beverages)?   { 1-11:376894}    How many days per week do you exercise enough to make your heart beat faster? { :187392}    How many minutes a day do you exercise enough to make your heart beat faster? { :838595}    How many days per week do you miss taking your medication? {0-7 :209970}    {additonal problems for provider to add (Optional):453046}    Review of Systems   {ROS COMP (Optional):999417}      Objective           Vitals:  No vitals were obtained today due to virtual visit.    Physical Exam   {video visit exam brief selected:745424::\"GENERAL: Healthy, alert and no distress\",\"EYES: Eyes grossly normal to inspection.  No discharge or erythema, or obvious scleral/conjunctival abnormalities.\",\"RESP: No audible wheeze, cough, or visible cyanosis.  No visible retractions or increased work of breathing.  \",\"SKIN: Visible skin clear. No significant rash, abnormal pigmentation or lesions.\",\"NEURO: Cranial nerves grossly intact.  Mentation and speech appropriate for age.\",\"PSYCH: Mentation appears normal, affect normal/bright, judgement and insight intact, normal speech and appearance well-groomed.\"}    {Diagnostic Test Results (Optional):192956}    {AMBULATORY ATTESTATION (Optional):373328}        Video-Visit Details    Video Start Time: {video visit start/end time for provider to select:133118}    Type of service:  Video Visit    Video End Time:{video visit start/end time for provider to select:324329}    Originating Location (pt. Location): {video visit patient location:899492::\"Home\"}    Distant Location (provider location[facility]  [facility]     Platform used for Video Visit: {Virtual Visit Platforms:746614::\"pbsi\"}    .  ..  "

## 2022-07-06 ENCOUNTER — VIRTUAL VISIT (OUTPATIENT)
Dept: FAMILY MEDICINE | Facility: CLINIC | Age: 22
End: 2022-07-06
Payer: COMMERCIAL

## 2022-07-06 DIAGNOSIS — F43.22 ADJUSTMENT DISORDER WITH ANXIOUS MOOD: Primary | ICD-10-CM

## 2022-07-06 PROCEDURE — 99207 PR NO CHARGE LOS: CPT | Mod: 25 | Performed by: FAMILY MEDICINE

## 2022-07-06 ASSESSMENT — ANXIETY QUESTIONNAIRES
GAD7 TOTAL SCORE: 7
GAD7 TOTAL SCORE: 7
5. BEING SO RESTLESS THAT IT IS HARD TO SIT STILL: SEVERAL DAYS
2. NOT BEING ABLE TO STOP OR CONTROL WORRYING: SEVERAL DAYS
8. IF YOU CHECKED OFF ANY PROBLEMS, HOW DIFFICULT HAVE THESE MADE IT FOR YOU TO DO YOUR WORK, TAKE CARE OF THINGS AT HOME, OR GET ALONG WITH OTHER PEOPLE?: SOMEWHAT DIFFICULT
GAD7 TOTAL SCORE: 7
7. FEELING AFRAID AS IF SOMETHING AWFUL MIGHT HAPPEN: SEVERAL DAYS
1. FEELING NERVOUS, ANXIOUS, OR ON EDGE: SEVERAL DAYS
6. BECOMING EASILY ANNOYED OR IRRITABLE: NOT AT ALL
4. TROUBLE RELAXING: MORE THAN HALF THE DAYS
7. FEELING AFRAID AS IF SOMETHING AWFUL MIGHT HAPPEN: SEVERAL DAYS
3. WORRYING TOO MUCH ABOUT DIFFERENT THINGS: SEVERAL DAYS

## 2022-07-06 ASSESSMENT — PATIENT HEALTH QUESTIONNAIRE - PHQ9
SUM OF ALL RESPONSES TO PHQ QUESTIONS 1-9: 7
SUM OF ALL RESPONSES TO PHQ QUESTIONS 1-9: 7
10. IF YOU CHECKED OFF ANY PROBLEMS, HOW DIFFICULT HAVE THESE PROBLEMS MADE IT FOR YOU TO DO YOUR WORK, TAKE CARE OF THINGS AT HOME, OR GET ALONG WITH OTHER PEOPLE: SOMEWHAT DIFFICULT

## 2022-07-06 NOTE — PROGRESS NOTES
Unable to connect on rewellsheldon and her VM is full.  Will have to reschedule  Answers for HPI/ROS submitted by the patient on 7/6/2022  If you checked off any problems, how difficult have these problems made it for you to do your work, take care of things at home, or get along with other people?: Somewhat difficult  PHQ9 TOTAL SCORE: 7  ROLLY 7 TOTAL SCORE: 7  Depression/Anxiety: Anxiety  Anxiety since last: : medium  Other associated symotome: : No  Significant life event: : No  Anxious:: Yes  Current substance use:: No  How many servings of fruits and vegetables do you eat daily?: 4 or more  On average, how many sweetened beverages do you drink each day (Examples: soda, juice, sweet tea, etc.  Do NOT count diet or artificially sweetened beverages)?: 0  How many minutes a day do you exercise enough to make your heart beat faster?: 20 to 29  How many days a week do you exercise enough to make your heart beat faster?: 4  How many days per week do you miss taking your medication?: 1  What makes it hard for you to take your medication every day?: remembering to take

## 2022-08-08 ENCOUNTER — OFFICE VISIT (OUTPATIENT)
Dept: FAMILY MEDICINE | Facility: CLINIC | Age: 22
End: 2022-08-08
Payer: COMMERCIAL

## 2022-08-08 VITALS
OXYGEN SATURATION: 96 % | BODY MASS INDEX: 23 KG/M2 | SYSTOLIC BLOOD PRESSURE: 128 MMHG | RESPIRATION RATE: 16 BRPM | HEIGHT: 71 IN | DIASTOLIC BLOOD PRESSURE: 84 MMHG | WEIGHT: 164.3 LBS | TEMPERATURE: 97.1 F | HEART RATE: 89 BPM

## 2022-08-08 DIAGNOSIS — Z97.5 IUD (INTRAUTERINE DEVICE) IN PLACE: ICD-10-CM

## 2022-08-08 DIAGNOSIS — L70.0 ACNE VULGARIS: ICD-10-CM

## 2022-08-08 DIAGNOSIS — Z83.49 FAMILY HISTORY OF THYROID DISORDER: ICD-10-CM

## 2022-08-08 DIAGNOSIS — F43.23 ADJUSTMENT DISORDER WITH MIXED ANXIETY AND DEPRESSED MOOD: ICD-10-CM

## 2022-08-08 DIAGNOSIS — J45.30 MILD PERSISTENT ASTHMA WITHOUT COMPLICATION: ICD-10-CM

## 2022-08-08 DIAGNOSIS — Z00.00 ROUTINE GENERAL MEDICAL EXAMINATION AT A HEALTH CARE FACILITY: Primary | ICD-10-CM

## 2022-08-08 DIAGNOSIS — Z11.3 SCREEN FOR STD (SEXUALLY TRANSMITTED DISEASE): ICD-10-CM

## 2022-08-08 PROCEDURE — 87591 N.GONORRHOEAE DNA AMP PROB: CPT | Performed by: FAMILY MEDICINE

## 2022-08-08 PROCEDURE — 86803 HEPATITIS C AB TEST: CPT | Performed by: FAMILY MEDICINE

## 2022-08-08 PROCEDURE — 84443 ASSAY THYROID STIM HORMONE: CPT | Performed by: FAMILY MEDICINE

## 2022-08-08 PROCEDURE — 87491 CHLMYD TRACH DNA AMP PROBE: CPT | Performed by: FAMILY MEDICINE

## 2022-08-08 PROCEDURE — 99395 PREV VISIT EST AGE 18-39: CPT | Performed by: FAMILY MEDICINE

## 2022-08-08 PROCEDURE — 36415 COLL VENOUS BLD VENIPUNCTURE: CPT | Performed by: FAMILY MEDICINE

## 2022-08-08 PROCEDURE — 87389 HIV-1 AG W/HIV-1&-2 AB AG IA: CPT | Performed by: FAMILY MEDICINE

## 2022-08-08 PROCEDURE — 99213 OFFICE O/P EST LOW 20 MIN: CPT | Mod: 25 | Performed by: FAMILY MEDICINE

## 2022-08-08 PROCEDURE — 80048 BASIC METABOLIC PNL TOTAL CA: CPT | Performed by: FAMILY MEDICINE

## 2022-08-08 RX ORDER — SPIRONOLACTONE 100 MG/1
100 TABLET, FILM COATED ORAL DAILY
Qty: 30 TABLET | Refills: 4 | Status: SHIPPED | OUTPATIENT
Start: 2022-08-08 | End: 2022-09-13

## 2022-08-08 ASSESSMENT — PATIENT HEALTH QUESTIONNAIRE - PHQ9
SUM OF ALL RESPONSES TO PHQ QUESTIONS 1-9: 7
5. POOR APPETITE OR OVEREATING: MORE THAN HALF THE DAYS

## 2022-08-08 ASSESSMENT — ENCOUNTER SYMPTOMS
NAUSEA: 0
CONSTIPATION: 0
ARTHRALGIAS: 0
HEMATOCHEZIA: 0
DIZZINESS: 0
BREAST MASS: 0
MYALGIAS: 0
NERVOUS/ANXIOUS: 1
ABDOMINAL PAIN: 0
HEADACHES: 0
JOINT SWELLING: 0
HEARTBURN: 0
PALPITATIONS: 0
PARESTHESIAS: 0
SHORTNESS OF BREATH: 0
SORE THROAT: 0
FREQUENCY: 0
HEMATURIA: 0
DIARRHEA: 0
WEAKNESS: 0
CHILLS: 0
EYE PAIN: 0
FEVER: 0
COUGH: 0
DYSURIA: 0

## 2022-08-08 ASSESSMENT — ANXIETY QUESTIONNAIRES
7. FEELING AFRAID AS IF SOMETHING AWFUL MIGHT HAPPEN: MORE THAN HALF THE DAYS
3. WORRYING TOO MUCH ABOUT DIFFERENT THINGS: SEVERAL DAYS
1. FEELING NERVOUS, ANXIOUS, OR ON EDGE: NEARLY EVERY DAY
IF YOU CHECKED OFF ANY PROBLEMS ON THIS QUESTIONNAIRE, HOW DIFFICULT HAVE THESE PROBLEMS MADE IT FOR YOU TO DO YOUR WORK, TAKE CARE OF THINGS AT HOME, OR GET ALONG WITH OTHER PEOPLE: VERY DIFFICULT
2. NOT BEING ABLE TO STOP OR CONTROL WORRYING: MORE THAN HALF THE DAYS
6. BECOMING EASILY ANNOYED OR IRRITABLE: NOT AT ALL
GAD7 TOTAL SCORE: 12
5. BEING SO RESTLESS THAT IT IS HARD TO SIT STILL: MORE THAN HALF THE DAYS
GAD7 TOTAL SCORE: 12

## 2022-08-08 ASSESSMENT — PAIN SCALES - GENERAL: PAINLEVEL: NO PAIN (1)

## 2022-08-08 NOTE — PROGRESS NOTES
SUBJECTIVE:   CC: Yady Sellers is an 21 year old woman who presents for preventive health visit.       Patient has been advised of split billing requirements and indicates understanding: Yes  Healthy Habits:     Getting at least 3 servings of Calcium per day:  Yes    Bi-annual eye exam:  NO    Dental care twice a year:  Yes    Sleep apnea or symptoms of sleep apnea:  None    Diet:  Regular (no restrictions)    Frequency of exercise:  4-5 days/week    Duration of exercise:  30-45 minutes    Taking medications regularly:  Yes    Medication side effects:  None    PHQ-2 Total Score: 2    Additional concerns today:  No      Still often anxious.  Prozac helps- no side effects but still quite anxious.  preping for MCAT.  Depression responded to wellbutrin- would like to continue .  Has therapy everyother week      PHQ 6/21/2022 7/6/2022 8/8/2022   PHQ-9 Total Score 7 7 7   Q9: Thoughts of better off dead/self-harm past 2 weeks Not at all Not at all Not at all   F/U: Thoughts of suicide or self-harm - - -   F/U: Self harm-plan - - -   F/U: Self-harm action - - -   F/U: Safety concerns - - -     ROLLY-7 SCORE 6/21/2022 7/6/2022 8/8/2022   Total Score 17 (severe anxiety) 7 (mild anxiety) -   Total Score 17 7 12         Today's PHQ-2 Score:   PHQ-2 ( 1999 [employer]) 8/8/2022   Q1: Little interest or pleasure in doing things 1   Q2: Feeling down, depressed or hopeless 1   PHQ-2 Score 2   PHQ-2 Total Score (12-17 Years)- Positive if 3 or more points; Administer PHQ-A if positive -   Q1: Little interest or pleasure in doing things Several days   Q2: Feeling down, depressed or hopeless Several days   PHQ-2 Score 2       Abuse: Current or Past (Physical, Sexual or Emotional) - No  Do you feel safe in your environment? Yes    Have you ever done Advance Care Planning? (For example, a Health Directive, POLST, or a discussion with a medical provider or your loved ones about your wishes): No, advance care planning information given to  patient to review.  Patient plans to discuss their wishes with loved ones or provider.      Social History     Tobacco Use     Smoking status: Never Smoker     Smokeless tobacco: Never Used   Substance Use Topics     Alcohol use: Yes     If you drink alcohol do you typically have >3 drinks per day or >7 drinks per week? No    Alcohol Use 8/8/2022   Prescreen: >3 drinks/day or >7 drinks/week? No   Prescreen: >3 drinks/day or >7 drinks/week? -   No flowsheet data found.    Reviewed orders with patient.  Reviewed health maintenance and updated orders accordingly - Yes  BP Readings from Last 3 Encounters:   08/08/22 128/84   07/22/21 122/64   06/25/18 126/77    Wt Readings from Last 3 Encounters:   08/08/22 74.5 kg (164 lb 4.8 oz)   07/22/21 76.7 kg (169 lb)   07/08/21 76.5 kg (168 lb 9.6 oz)                    Breast Cancer Screening:        History of abnormal Pap smear: NO - age 21-29 PAP every 3 years recommended  PAP / HPV 7/8/2021   PAP (Historical) NIL     Reviewed and updated as needed this visit by clinical staff   Tobacco  Allergies  Meds   Med Hx  Surg Hx  Fam Hx  Soc Hx          Reviewed and updated as needed this visit by Provider                       Review of Systems   Constitutional: Negative for chills and fever.   HENT: Negative for congestion, ear pain, hearing loss and sore throat.    Eyes: Negative for pain and visual disturbance.   Respiratory: Negative for cough and shortness of breath.    Cardiovascular: Negative for chest pain, palpitations and peripheral edema.   Gastrointestinal: Negative for abdominal pain, constipation, diarrhea, heartburn, hematochezia and nausea.   Breasts:  Negative for tenderness, breast mass and discharge.   Genitourinary: Negative for dysuria, frequency, genital sores, hematuria, pelvic pain, urgency, vaginal bleeding and vaginal discharge.   Musculoskeletal: Negative for arthralgias, joint swelling and myalgias.   Skin: Negative for rash.   Neurological:  "Negative for dizziness, weakness, headaches and paresthesias.   Psychiatric/Behavioral: Negative for mood changes. The patient is nervous/anxious.           OBJECTIVE:   /84   Pulse 89   Temp 97.1  F (36.2  C) (Temporal)   Resp 16   Ht 1.803 m (5' 11\")   Wt 74.5 kg (164 lb 4.8 oz)   LMP 08/07/2022 (Exact Date)   SpO2 96%   BMI 22.92 kg/m    Physical Exam  GENERAL: healthy, alert and no distress  EYES: Eyes grossly normal to inspection, PERRL and conjunctivae and sclerae normal  HENT: ear canals and TM's normal, nose and mouth without ulcers or lesions  NECK: no adenopathy, no asymmetry, masses, or scars and thyroid normal to palpation  RESP: lungs clear to auscultation - no rales, rhonchi or wheezes  BREAST: normal without masses, tenderness or nipple discharge and no palpable axillary masses or adenopathy  CV: regular rate and rhythm, normal S1 S2, no S3 or S4, no murmur, click or rub, no peripheral edema and peripheral pulses strong  ABDOMEN: soft, nontender, no hepatosplenomegaly, no masses and bowel sounds normal  MS: no gross musculoskeletal defects noted, no edema  SKIN: no suspicious lesions or rashes  NEURO: Normal strength and tone, mentation intact and speech normal  PSYCH: mentation appears normal, affect normal/bright    Diagnostic Test Results:  Labs reviewed in Epic  Results for orders placed or performed in visit on 08/08/22   HIV Antigen Antibody Combo     Status: Normal   Result Value Ref Range    HIV Antigen Antibody Combo Nonreactive Nonreactive   Hepatitis C Screen Reflex to HCV RNA Quant and Genotype     Status: Normal   Result Value Ref Range    Hepatitis C Antibody Nonreactive Nonreactive    Narrative    Assay performance characteristics have not been established for newborns, infants, and children.   Basic metabolic panel  (Ca, Cl, CO2, Creat, Gluc, K, Na, BUN)     Status: Normal   Result Value Ref Range    Sodium 139 133 - 144 mmol/L    Potassium 4.6 3.4 - 5.3 mmol/L    " Chloride 106 94 - 109 mmol/L    Carbon Dioxide (CO2) 26 20 - 32 mmol/L    Anion Gap 7 3 - 14 mmol/L    Urea Nitrogen 9 7 - 30 mg/dL    Creatinine 0.89 0.52 - 1.04 mg/dL    Calcium 9.4 8.5 - 10.1 mg/dL    Glucose 75 70 - 99 mg/dL    GFR Estimate >90 >60 mL/min/1.73m2   TSH with free T4 reflex     Status: Normal   Result Value Ref Range    TSH 1.14 0.40 - 4.00 mU/L   NEISSERIA GONORRHOEA PCR     Status: Normal    Specimen: Vagina; Swab   Result Value Ref Range    Neisseria gonorrhoeae Negative Negative   CHLAMYDIA TRACHOMATIS PCR     Status: Normal    Specimen: Vagina; Swab   Result Value Ref Range    Chlamydia trachomatis Negative Negative       ASSESSMENT/PLAN:   Yady was seen today for physical.    Diagnoses and all orders for this visit:    Routine general medical examination at a health care facility  -Pap up-to-date repeat in 3 years due July 2024.     REVIEW OF HEALTH MAINTENANCE PROTOCOL ORDERS    Adjustment disorder with mixed anxiety and depressed mood  Comments:  increase fluoxetine form 10 to 20 mg qd. TE fup in 3 weeks.  Orders:  -     FLUoxetine (PROZAC) 20 MG capsule; Take 1 capsule (20 mg) by mouth daily  PHQ 6/21/2022 7/6/2022 8/8/2022   PHQ-9 Total Score 7 7 7   Q9: Thoughts of better off dead/self-harm past 2 weeks Not at all Not at all Not at all   F/U: Thoughts of suicide or self-harm - - -   F/U: Self harm-plan - - -   F/U: Self-harm action - - -   F/U: Safety concerns - - -     ROLLY-7 SCORE 6/21/2022 7/6/2022 8/8/2022   Total Score 17 (severe anxiety) 7 (mild anxiety) -   Total Score 17 7 12       -     OFFICE/OUTPT VISIT,EST,LEVL III    Acne vulgaris  Comments:  add sprinolactone. BMP today  Orders:  -     spironolactone (ALDACTONE) 100 MG tablet; Take 1 tablet (100 mg) by mouth daily  -     Basic metabolic panel  (Ca, Cl, CO2, Creat, Gluc, K, Na, BUN); Future  -     OFFICE/OUTPT VISIT,EST,LEVL III  -     Basic metabolic panel  (Ca, Cl, CO2, Creat, Gluc, K, Na, BUN)    Mild persistent asthma  "without complication  Comments:  flovent has helped- but expensive- so takes it only qd.  singular did not help  Orders:  -     fluticasone-vilanterol (BREO ELLIPTA) 200-25 MCG/INH inhaler; Inhale 1 puff into the lungs daily for 30 days  -     OFFICE/OUTPT VISIT,EST,LEVL III  AAP/ACT- reviewed    Screen for STD (sexually transmitted disease)  -     NEISSERIA GONORRHOEA PCR; Future  -     CHLAMYDIA TRACHOMATIS PCR; Future  -     HIV Antigen Antibody Combo; Future  -     Hepatitis C Screen Reflex to HCV RNA Quant and Genotype; Future  -     NEISSERIA GONORRHOEA PCR  -     CHLAMYDIA TRACHOMATIS PCR  -     HIV Antigen Antibody Combo  -     Hepatitis C Screen Reflex to HCV RNA Quant and Genotype   mirena IUD in place  Comments:  Anastasiya 08/2021    Family history of thyroid disorder  Comments:  grand mom  Orders:  -     TSH with free T4 reflex; Future  -     TSH with free T4 reflex        Patient has been advised of split billing requirements and indicates understanding: Yes    COUNSELING:  Reviewed preventive health counseling, as reflected in patient instructions       Regular exercise       Healthy diet/nutrition       Vision screening       Hearing screening       Contraception       Family planning       Folic Acid    Estimated body mass index is 22.92 kg/m  as calculated from the following:    Height as of this encounter: 1.803 m (5' 11\").    Weight as of this encounter: 74.5 kg (164 lb 4.8 oz).        She reports that she has never smoked. She has never used smokeless tobacco.      Counseling Resources:  ATP IV Guidelines  Pooled Cohorts Equation Calculator  Breast Cancer Risk Calculator  BRCA-Related Cancer Risk Assessment: FHS-7 Tool  FRAX Risk Assessment  ICSI Preventive Guidelines  Dietary Guidelines for Americans, 2010  USDA's MyPlate  ASA Prophylaxis  Lung CA Screening    Adriana Jimenez MD  [facility]  "

## 2022-08-09 ENCOUNTER — TELEPHONE (OUTPATIENT)
Dept: FAMILY MEDICINE | Facility: CLINIC | Age: 22
End: 2022-08-09

## 2022-08-09 LAB
C TRACH DNA SPEC QL NAA+PROBE: NEGATIVE
HCV AB SERPL QL IA: NONREACTIVE
HIV 1+2 AB+HIV1 P24 AG SERPL QL IA: NONREACTIVE
N GONORRHOEA DNA SPEC QL NAA+PROBE: NEGATIVE

## 2022-08-09 NOTE — TELEPHONE ENCOUNTER
Prior Authorization Not Needed per Insurance    Medication: fluticasone-vilanterol (BREO ELLIPTA) 200-25 MCG/INH inhaler-PA NOT NEEDED  Insurance Company: [employer] - Phone [phone] Fax [phone]  Expected CoPay:      Pharmacy Filling the Rx: [id number] - [facility] [address] [facility] [address] [facility]  Pharmacy Notified: Yes  Patient Notified: No    Called pharmacy, they processed and there is a $30 copay.  **Instructed pharmacy to notify patient when script is ready to /ship.**

## 2022-08-09 NOTE — TELEPHONE ENCOUNTER
Central Prior Authorization Team   Phone: [phone]      PA Initiation    Medication: fluticasone-vilanterol (BREO ELLIPTA) 200-25 MCG/INH inhaler  Insurance Company: [employer] Phone [phone] Fax [phone]  Pharmacy Filling the Rx: [facility] #[id number] [place] [address] AT [facility] [address]  Filling Pharmacy Phone: [phone]  Filling Pharmacy Fax:    Start Date: 8/9/2022

## 2022-08-09 NOTE — TELEPHONE ENCOUNTER
Prior Authorization Retail Medication Request    Medication/Dose: fluticasone-vilanterol (BREO ELLIPTA) 200-25 MCG/INH inhaler  ICD code (if different than what is on RX):  Mild persistent asthma without complication [J45.30  Previously Tried and Failed:  unknown  Rationale:  unknown    Insurance Name:  [employer]  Insurance ID:  [id number]      Pharmacy Information (if different than what is on RX)  Name:  [facility]  Phone:  [phone]

## 2022-08-10 LAB
ANION GAP SERPL CALCULATED.3IONS-SCNC: 7 MMOL/L (ref 3–14)
BUN SERPL-MCNC: 9 MG/DL (ref 7–30)
CALCIUM SERPL-MCNC: 9.4 MG/DL (ref 8.5–10.1)
CHLORIDE BLD-SCNC: 106 MMOL/L (ref 94–109)
CO2 SERPL-SCNC: 26 MMOL/L (ref 20–32)
CREAT SERPL-MCNC: 0.89 MG/DL (ref 0.52–1.04)
GFR SERPL CREATININE-BSD FRML MDRD: >90 ML/MIN/1.73M2
GLUCOSE BLD-MCNC: 75 MG/DL (ref 70–99)
POTASSIUM BLD-SCNC: 4.6 MMOL/L (ref 3.4–5.3)
SODIUM SERPL-SCNC: 139 MMOL/L (ref 133–144)

## 2022-08-11 LAB — TSH SERPL DL<=0.005 MIU/L-ACNC: 1.14 MU/L (ref 0.4–4)

## 2022-08-16 PROBLEM — Z83.49 FAMILY HISTORY OF THYROID DISORDER: Status: ACTIVE | Noted: 2022-08-16

## 2022-08-16 ASSESSMENT — ASTHMA QUESTIONNAIRES: ACT_TOTALSCORE: 25

## 2022-09-13 ENCOUNTER — VIRTUAL VISIT (OUTPATIENT)
Dept: FAMILY MEDICINE | Facility: CLINIC | Age: 22
End: 2022-09-13
Payer: COMMERCIAL

## 2022-09-13 DIAGNOSIS — L70.0 ACNE VULGARIS: ICD-10-CM

## 2022-09-13 DIAGNOSIS — J45.30 MILD PERSISTENT ASTHMA WITHOUT COMPLICATION: ICD-10-CM

## 2022-09-13 DIAGNOSIS — F43.23 ADJUSTMENT DISORDER WITH MIXED ANXIETY AND DEPRESSED MOOD: Primary | ICD-10-CM

## 2022-09-13 PROCEDURE — 96127 BRIEF EMOTIONAL/BEHAV ASSMT: CPT | Mod: 95 | Performed by: FAMILY MEDICINE

## 2022-09-13 PROCEDURE — 99214 OFFICE O/P EST MOD 30 MIN: CPT | Mod: 95 | Performed by: FAMILY MEDICINE

## 2022-09-13 RX ORDER — SPIRONOLACTONE 100 MG/1
100 TABLET, FILM COATED ORAL DAILY
Qty: 30 TABLET | Refills: 11 | Status: SHIPPED | OUTPATIENT
Start: 2022-09-13 | End: 2023-02-13

## 2022-09-13 RX ORDER — FLUOXETINE 40 MG/1
40 CAPSULE ORAL DAILY
Qty: 30 CAPSULE | Refills: 11 | Status: SHIPPED | OUTPATIENT
Start: 2022-09-13 | End: 2022-11-01

## 2022-09-13 ASSESSMENT — ANXIETY QUESTIONNAIRES
4. TROUBLE RELAXING: MORE THAN HALF THE DAYS
1. FEELING NERVOUS, ANXIOUS, OR ON EDGE: MORE THAN HALF THE DAYS
3. WORRYING TOO MUCH ABOUT DIFFERENT THINGS: SEVERAL DAYS
6. BECOMING EASILY ANNOYED OR IRRITABLE: NOT AT ALL
GAD7 TOTAL SCORE: 8
8. IF YOU CHECKED OFF ANY PROBLEMS, HOW DIFFICULT HAVE THESE MADE IT FOR YOU TO DO YOUR WORK, TAKE CARE OF THINGS AT HOME, OR GET ALONG WITH OTHER PEOPLE?: SOMEWHAT DIFFICULT
7. FEELING AFRAID AS IF SOMETHING AWFUL MIGHT HAPPEN: SEVERAL DAYS
5. BEING SO RESTLESS THAT IT IS HARD TO SIT STILL: SEVERAL DAYS
GAD7 TOTAL SCORE: 8
IF YOU CHECKED OFF ANY PROBLEMS ON THIS QUESTIONNAIRE, HOW DIFFICULT HAVE THESE PROBLEMS MADE IT FOR YOU TO DO YOUR WORK, TAKE CARE OF THINGS AT HOME, OR GET ALONG WITH OTHER PEOPLE: SOMEWHAT DIFFICULT
7. FEELING AFRAID AS IF SOMETHING AWFUL MIGHT HAPPEN: SEVERAL DAYS
2. NOT BEING ABLE TO STOP OR CONTROL WORRYING: SEVERAL DAYS

## 2022-09-13 ASSESSMENT — PATIENT HEALTH QUESTIONNAIRE - PHQ9: SUM OF ALL RESPONSES TO PHQ QUESTIONS 1-9: 11

## 2022-09-13 ASSESSMENT — ENCOUNTER SYMPTOMS: NERVOUS/ANXIOUS: 1

## 2022-09-13 ASSESSMENT — ASTHMA QUESTIONNAIRES: ACT_TOTALSCORE: 25

## 2022-09-13 NOTE — PROGRESS NOTES
Yady is a 21 year old who is being evaluated via a billable video visit.      How would you like to obtain your AVS? MyChart  If the video visit is dropped, the invitation should be resent by: Send to e-mail at: [email or web address]  Will anyone else be joining your video visit? No          Assessment & Plan     Adjustment disorder with mixed anxiety and depressed mood  ROLYL-7 SCORE 7/6/2022 8/8/2022 9/13/2022   Total Score 7 (mild anxiety) - 8 (mild anxiety)   Total Score 7 12 8     PHQ 7/6/2022 8/8/2022 9/13/2022   PHQ-9 Total Score 7 7 11   Q9: Thoughts of better off dead/self-harm past 2 weeks Not at all Not at all Not at all   F/U: Thoughts of suicide or self-harm - - -   F/U: Self harm-plan - - -   F/U: Self-harm action - - -   F/U: Safety concerns - - -   comments; Wellbutrin/bupropion possibly adding to anxiety and also depression is not well controlled.  Fluoxetine/Prozac has helped.    Plan: 1.Please stop bupropion-unlikely to cause withdrawal side effects.    2. Increase prozac/ fluoxetine  from  20 to 40 mg once daily.    3. Follow up in 4 weeks    4. Follow up earlier if concerns about mood or higher dose of Prozac/fluoxetine .    5. Continue with counseling .    - FLUoxetine (PROZAC) 40 MG capsule; Take 1 capsule (40 mg) by mouth daily  Potential medication side effects were discussed with the patient; let me know if any occur.      Safety plan -be around loved once  Or go  to the ER as needed or call after hours crisis line; [phone]  Sucide prevention life line [phone]  Community out reach for psych emergencies [phone].    Acne vulgaris  Need refills   - spironolactone (ALDACTONE) 100 MG tablet; Take 1 tablet (100 mg) by mouth daily      Asthma persistent- well controlled  ACT/AAP reviewed no concerns   93170}     Depression Screening Follow Up    PHQ 9/13/2022   PHQ-9 Total Score 11   Q9: Thoughts of better off dead/self-harm past 2 weeks Not at all   F/U: Thoughts of suicide or self-harm  -   F/U: Self harm-plan -   F/U: Self-harm action -   F/U: Safety concerns -         Follow Up Actions Taken  Crisis resource information provided in After Visit Summary  no thoughts or plans of self hard or sucide     See Patient Instructions    Return in about 4 weeks (around 10/11/2022) for concerns,unresolved, virtual/video visit, medications recheck/review/refill, mood.    Adriana Jimenez MD  [facility]    Rolando Conteh is a 21 year old, presenting for the following health issues:  Anxiety      Anxiety    History of Present Illness       Mental Health Follow-up:  Patient presents to follow-up on Depression & Anxiety.Patient's depression since last visit has been:  Medium  The patient is not having other symptoms associated with depression.  Patient's anxiety since last visit has been:  Medium  The patient is not having other symptoms associated with anxiety.  Any significant life events: No  Patient is feeling anxious or having panic attacks.  Patient has no concerns about alcohol or drug use.    She eats 4 or more servings of fruits and vegetables daily.She consumes 0 sweetened beverage(s) daily.She exercises with enough effort to increase her heart rate 20 to 29 minutes per day.  She exercises with enough effort to increase her heart rate 4 days per week. She is missing 1 dose(s) of medications per week.  She is not taking prescribed medications regularly due to remembering to take.  Today's ROLLY-7 Score: 8     ROLLY-7 SCORE 7/6/2022 8/8/2022 9/13/2022   Total Score 7 (mild anxiety) - 8 (mild anxiety)   Total Score 7 12 8     PHQ 7/6/2022 8/8/2022 9/13/2022   PHQ-9 Total Score 7 7 11   Q9: Thoughts of better off dead/self-harm past 2 weeks Not at all Not at all Not at all   F/U: Thoughts of suicide or self-harm - - -   F/U: Self harm-plan - - -   F/U: Self-harm action - - -   F/U: Safety concerns - - -         Review of Systems   Psychiatric/Behavioral: The patient is  nervous/anxious.     she is in therapy- once a week  Possibly Wellbutrin adding to anxiety.  Prozac not as effective a lexapro.  Its has helped with anxiety but not all they way  lexapro caused side effects so can not be used  No sucidal thoughts or self harm thoughts  Done with MCAT  Starting the process of job hunting.  Feels safe at parents   Acne is better with spironolactone and would like refills .      Constitutional, HEENT, cardiovascular, pulmonary, GI, , musculoskeletal, neuro, skin, endocrine and psych systems are negative, except as otherwise noted.      Objective           Vitals:  No vitals were obtained today due to virtual visit.    Physical Exam   GENERAL: Healthy, alert and no distress  EYES: Eyes grossly normal to inspection.  No discharge or erythema, or obvious scleral/conjunctival abnormalities.  RESP: No audible wheeze, cough, or visible cyanosis.  No visible retractions or increased work of breathing.    SKIN: Visible skin clear. No significant rash, abnormal pigmentation or lesions.  NEURO: Cranial nerves grossly intact.  Mentation and speech appropriate for age.  PSYCH: Mentation appears normal, affect normal/bright, judgement and insight intact, normal speech and appearance well-groomed.          Video-Visit Details    Video Start Time: 12:43 PM    Type of service:  Video Visit    Video End Time:12:57 PM    Originating Location (pt. Location): Home    Distant Location (provider location):  [facility]     Platform used for Video Visit: [employer]

## 2022-09-13 NOTE — LETTER
My Asthma Action Plan    Name: Yady Sellers   YOB: 2000  Date: 9/13/2022   My doctor: Adriana Jimenez MD   My clinic: [facility]        My Control Medicine: Fluticasone propionate (Flovent HFA) - 220 mcg twice a day  My Rescue Medicine: Albuterol (Proair/Ventolin/Proventil HFA) 2-4 puffs EVERY 4 HOURS as needed. Use a spacer if recommended by your provider.   My Asthma Severity:   Mild Persistent  Know your asthma triggers: pollens  humidity  Patient is unaware of triggers            GREEN ZONE   Good Control    I feel good    No cough or wheeze    Can work, sleep and play without asthma symptoms       Take your asthma control medicine every day.     1. If exercise triggers your asthma, take your rescue medication    15 minutes before exercise or sports, and    During exercise if you have asthma symptoms  2. Spacer to use with inhaler: If you have a spacer, make sure to use it with your inhaler             YELLOW ZONE Getting Worse  I have ANY of these:    I do not feel good    Cough or wheeze    Chest feels tight    Wake up at night   1. Keep taking your Green Zone medications  2. Start taking your rescue medicine:    every 20 minutes for up to 1 hour. Then every 4 hours for 24-48 hours.  3. If you stay in the Yellow Zone for more than 12-24 hours, contact your doctor.  4. If you do not return to the Green Zone in 12-24 hours or you get worse, start taking your oral steroid medicine if prescribed by your provider.           RED ZONE Medical Alert - Get Help  I have ANY of these:    I feel awful    Medicine is not helping    Breathing getting harder    Trouble walking or talking    Nose opens wide to breathe       1. Take your rescue medicine NOW  2. If your provider has prescribed an oral steroid medicine, start taking it NOW  3. Call your doctor NOW  4. If you are still in the Red Zone after 20 minutes and you have not reached your doctor:    Take your rescue medicine  again and    Call 911 or go to the emergency room right away    See your regular doctor within 2 weeks of an Emergency Room or Urgent Care visit for follow-up treatment.          Annual Reminders:  Meet with Asthma Educator,  Flu Shot in the Fall, consider Pneumonia Vaccination for patients with asthma (aged 19 and older).    Pharmacy:    [id number] - [place], [place] - [address] AT [facility] [id number] - [place], [place] - [address]  AT [facility]    Electronically signed by Adriana Jimenez MD   Date: 09/13/22                      Asthma Triggers  How To Control Things That Make Your Asthma Worse    Triggers are things that make your asthma worse.  Look at the list below to help you find your triggers and what you can do about them.  You can help prevent asthma flare-ups by staying away from your triggers.      Trigger                                                          What you can do   Cigarette Smoke  Tobacco smoke can make asthma worse. Do not allow smoking in your home, car or around you.  Be sure no one smokes at a child s day care or school.  If you smoke, ask your health care provider for ways to help you quit.  Ask family members to quit too.  Ask your health care provider for a referral to Quit Plan to help you quit smoking, or call 3-568-510-PLAN.     Colds, Flu, Bronchitis  These are common triggers of asthma. Wash your hands often.  Don t touch your eyes, nose or mouth.  Get a flu shot every year.     Dust Mites  These are tiny bugs that live in cloth or carpet. They are too small to see. Wash sheets and blankets in hot water every week.   Encase pillows and mattress in dust mite proof covers.  Avoid having carpet if you can. If you have carpet, vacuum weekly.   Use a dust mask and HEPA vacuum.   Pollen and Outdoor Mold  Some people are allergic to trees, grass, or weed pollen, or molds. Try to keep your windows  closed.  Limit time out doors when pollen count is high.   Ask you health care provider about taking medicine during allergy season.     Animal Dander  Some people are allergic to skin flakes, urine or saliva from pets with fur or feathers. Keep pets with fur or feathers out of your home.    If you can t keep the pet outdoors, then keep the pet out of your bedroom.  Keep the bedroom door closed.  Keep pets off cloth furniture and away from stuffed toys.     Mice, Rats, and Cockroaches   Some people are allergic to the waste from these pests.   Cover food and garbage.  Clean up spills and food crumbs.  Store grease in the refrigerator.   Keep food out of the bedroom.   Indoor Mold  This can be a trigger if your home has high moisture. Fix leaking faucets, pipes, or other sources of water.   Clean moldy surfaces.  Dehumidify basement if it is damp and smelly.   Smoke, Strong Odors, and Sprays  These can reduce air quality. Stay away from strong odors and sprays, such as perfume, powder, hair spray, paints, smoke incense, paint, cleaning products, candles and new carpet.   Exercise or Sports  Some people with asthma have this trigger. Be active!  Ask your doctor about taking medicine before sports or exercise to prevent symptoms.    Warm up for 5-10 minutes before and after sports or exercise.     Other Triggers of Asthma  Cold air:  Cover your nose and mouth with a scarf.  Sometimes laughing or crying can be a trigger.  Some medicines and food can trigger asthma.

## 2022-09-13 NOTE — PATIENT INSTRUCTIONS
1.Please stop bupropion- because of possibly adding to anxiety and also because dep not well controlled    2. Increase prozac/ fluoxetine  from  20 to 40 mg once daily.    3. Follow up in 4 weeks    4. Follow up earlier if do not like higher dose of Prozac/fluoxetine .    5. Continue with counseling .    Safety plan -be around loved once  Or go  to the ER as needed or call after hours crisis line; [phone]  Sucide prevention life line [phone]  Community out reach for psych emergencies [phone].

## 2022-10-19 DIAGNOSIS — L70.0 ACNE VULGARIS: ICD-10-CM

## 2022-10-20 RX ORDER — SPIRONOLACTONE 100 MG/1
TABLET, FILM COATED ORAL
Qty: 1 TABLET | Refills: 0 | OUTPATIENT
Start: 2022-10-20

## 2022-11-01 ENCOUNTER — TELEPHONE (OUTPATIENT)
Dept: FAMILY MEDICINE | Facility: CLINIC | Age: 22
End: 2022-11-01

## 2022-11-01 ENCOUNTER — VIRTUAL VISIT (OUTPATIENT)
Dept: FAMILY MEDICINE | Facility: CLINIC | Age: 22
End: 2022-11-01
Attending: FAMILY MEDICINE
Payer: COMMERCIAL

## 2022-11-01 DIAGNOSIS — Z11.1 SCREENING EXAMINATION FOR PULMONARY TUBERCULOSIS: ICD-10-CM

## 2022-11-01 DIAGNOSIS — J45.30 MILD PERSISTENT ASTHMA WITHOUT COMPLICATION: ICD-10-CM

## 2022-11-01 DIAGNOSIS — F43.23 ADJUSTMENT DISORDER WITH MIXED ANXIETY AND DEPRESSED MOOD: Primary | ICD-10-CM

## 2022-11-01 DIAGNOSIS — Z11.1 SCREENING EXAMINATION FOR PULMONARY TUBERCULOSIS: Primary | ICD-10-CM

## 2022-11-01 PROCEDURE — 96127 BRIEF EMOTIONAL/BEHAV ASSMT: CPT | Mod: 95 | Performed by: FAMILY MEDICINE

## 2022-11-01 PROCEDURE — 99214 OFFICE O/P EST MOD 30 MIN: CPT | Mod: 95 | Performed by: FAMILY MEDICINE

## 2022-11-01 RX ORDER — FLUOXETINE 40 MG/1
40 CAPSULE ORAL DAILY
Qty: 90 CAPSULE | Refills: 1 | Status: SHIPPED | OUTPATIENT
Start: 2022-11-01 | End: 2022-11-29

## 2022-11-01 RX ORDER — MONTELUKAST SODIUM 10 MG/1
10 TABLET ORAL AT BEDTIME
Qty: 90 TABLET | Refills: 1 | Status: SHIPPED | OUTPATIENT
Start: 2022-11-01 | End: 2023-04-24

## 2022-11-01 ASSESSMENT — ANXIETY QUESTIONNAIRES
3. WORRYING TOO MUCH ABOUT DIFFERENT THINGS: SEVERAL DAYS
5. BEING SO RESTLESS THAT IT IS HARD TO SIT STILL: SEVERAL DAYS
8. IF YOU CHECKED OFF ANY PROBLEMS, HOW DIFFICULT HAVE THESE MADE IT FOR YOU TO DO YOUR WORK, TAKE CARE OF THINGS AT HOME, OR GET ALONG WITH OTHER PEOPLE?: SOMEWHAT DIFFICULT
GAD7 TOTAL SCORE: 6
2. NOT BEING ABLE TO STOP OR CONTROL WORRYING: NOT AT ALL
IF YOU CHECKED OFF ANY PROBLEMS ON THIS QUESTIONNAIRE, HOW DIFFICULT HAVE THESE PROBLEMS MADE IT FOR YOU TO DO YOUR WORK, TAKE CARE OF THINGS AT HOME, OR GET ALONG WITH OTHER PEOPLE: SOMEWHAT DIFFICULT
6. BECOMING EASILY ANNOYED OR IRRITABLE: NOT AT ALL
7. FEELING AFRAID AS IF SOMETHING AWFUL MIGHT HAPPEN: SEVERAL DAYS
GAD7 TOTAL SCORE: 6
7. FEELING AFRAID AS IF SOMETHING AWFUL MIGHT HAPPEN: SEVERAL DAYS
4. TROUBLE RELAXING: MORE THAN HALF THE DAYS
1. FEELING NERVOUS, ANXIOUS, OR ON EDGE: SEVERAL DAYS
GAD7 TOTAL SCORE: 6

## 2022-11-01 ASSESSMENT — PATIENT HEALTH QUESTIONNAIRE - PHQ9: SUM OF ALL RESPONSES TO PHQ QUESTIONS 1-9: 6

## 2022-11-01 NOTE — LETTER
My Asthma Action Plan    Name: Yady Sellers   YOB: 2000  Date: 11/1/2022   My doctor: Adriana Jimenez MD   My clinic: [facility]        My Control Medicine: Fluticasone furoate (Arnuity Ellipta) -  100 mcg bid  My Rescue Medicine: Albuterol (Proair/Ventolin/Proventil HFA) 2-4 puffs EVERY 4 HOURS as needed. Use a spacer if recommended by your provider.  My Oral Steroid Medicine: . My Asthma Severity:   Mild Persistent  Know your asthma triggers: upper respiratory infections, dust mites, pollens, animal dander, humidity, exercise or sports, emotions and cold air  humidity  Patient is unaware of triggers            GREEN ZONE   Good Control    I feel good    No cough or wheeze    Can work, sleep and play without asthma symptoms       Take your asthma control medicine every day.     1. If exercise triggers your asthma, take your rescue medication    15 minutes before exercise or sports, and    During exercise if you have asthma symptoms  2. Spacer to use with inhaler: If you have a spacer, make sure to use it with your inhaler             YELLOW ZONE Getting Worse  I have ANY of these:    I do not feel good    Cough or wheeze    Chest feels tight    Wake up at night   1. Keep taking your Green Zone medications  2. Start taking your rescue medicine:    every 20 minutes for up to 1 hour. Then every 4 hours for 24-48 hours.  3. If you stay in the Yellow Zone for more than 12-24 hours, contact your doctor.  4. If you do not return to the Green Zone in 12-24 hours or you get worse, start taking your oral steroid medicine if prescribed by your provider.           RED ZONE Medical Alert - Get Help  I have ANY of these:    I feel awful    Medicine is not helping    Breathing getting harder    Trouble walking or talking    Nose opens wide to breathe       1. Take your rescue medicine NOW  2. If your provider has prescribed an oral steroid medicine, start taking it NOW  3. Call your  doctor NOW  4. If you are still in the Red Zone after 20 minutes and you have not reached your doctor:    Take your rescue medicine again and    Call 911 or go to the emergency room right away    See your regular doctor within 2 weeks of an Emergency Room or Urgent Care visit for follow-up treatment.          Annual Reminders:  Meet with Asthma Educator,  Flu Shot in the Fall, consider Pneumonia Vaccination for patients with asthma (aged 19 and older).    Pharmacy:    [id number] - [place], [place] - [address] AT [facility] [address] license of [facility][id number] - [place], [place] - [address] [facility] AT [facility] & [facility] [place], [place] - [address]    Electronically signed by Adriana Jimenez MD   Date: 11/01/22                      Asthma Triggers  How To Control Things That Make Your Asthma Worse    Triggers are things that make your asthma worse.  Look at the list below to help you find your triggers and what you can do about them.  You can help prevent asthma flare-ups by staying away from your triggers.      Trigger                                                          What you can do   Cigarette Smoke  Tobacco smoke can make asthma worse. Do not allow smoking in your home, car or around you.  Be sure no one smokes at a child s day care or school.  If you smoke, ask your health care provider for ways to help you quit.  Ask family members to quit too.  Ask your health care provider for a referral to Quit Plan to help you quit smoking, or call 1-638-306-PLAN.     Colds, Flu, Bronchitis  These are common triggers of asthma. Wash your hands often.  Don t touch your eyes, nose or mouth.  Get a flu shot every year.     Dust Mites  These are tiny bugs that live in cloth or carpet. They are too small to see. Wash sheets and blankets in hot water every week.   Encase pillows and mattress in dust mite proof covers.  Avoid having carpet if you can. If you have carpet,  vacuum weekly.   Use a dust mask and HEPA vacuum.   Pollen and Outdoor Mold  Some people are allergic to trees, grass, or weed pollen, or molds. Try to keep your windows closed.  Limit time out doors when pollen count is high.   Ask you health care provider about taking medicine during allergy season.     Animal Dander  Some people are allergic to skin flakes, urine or saliva from pets with fur or feathers. Keep pets with fur or feathers out of your home.    If you can t keep the pet outdoors, then keep the pet out of your bedroom.  Keep the bedroom door closed.  Keep pets off cloth furniture and away from stuffed toys.     Mice, Rats, and Cockroaches   Some people are allergic to the waste from these pests.   Cover food and garbage.  Clean up spills and food crumbs.  Store grease in the refrigerator.   Keep food out of the bedroom.   Indoor Mold  This can be a trigger if your home has high moisture. Fix leaking faucets, pipes, or other sources of water.   Clean moldy surfaces.  Dehumidify basement if it is damp and smelly.   Smoke, Strong Odors, and Sprays  These can reduce air quality. Stay away from strong odors and sprays, such as perfume, powder, hair spray, paints, smoke incense, paint, cleaning products, candles and new carpet.   Exercise or Sports  Some people with asthma have this trigger. Be active!  Ask your doctor about taking medicine before sports or exercise to prevent symptoms.    Warm up for 5-10 minutes before and after sports or exercise.     Other Triggers of Asthma  Cold air:  Cover your nose and mouth with a scarf.  Sometimes laughing or crying can be a trigger.  Some medicines and food can trigger asthma.

## 2022-11-01 NOTE — PROGRESS NOTES
Yady is a 21 year old who is being evaluated via a billable video visit.      How would you like to obtain your AVS? MyChart  If the video visit is dropped, the invitation should be resent by: Text to cell phone: [phone]  Will anyone else be joining your video visit? No          Assessment & Plan     Adjustment disorder with mixed anxiety and depressed mood  Plan: add regular excercise.  Keep taking - FLUoxetine (PROZAC) 40 MG capsule; Take 1 capsule (40 mg) by mouth daily  Phone or video follow up in 1 month.  Starting a new job as .  Follow-up in 4 weeks earlier if more concerns.  Mild persistent asthma without complication  Plan: change in inhalers Flovent was not covered- she was given breo- that causes jitteriness.  Changed to - fluticasone (ARNUITY ELLIPTA) 100 MCG/ACT inhaler; Inhale 1 puff into the lungs daily. Refills given . Wash mouth after use  Add - montelukast (SINGULAIR) 10 MG tablet; Take 1 tablet (10 mg) by mouth At Bedtime  Wash mouth after use of preventative IS  Potential medication side effects were discussed with the patient; let me know if any occur.    This is an addendum.  Patient requested TB screening and blood) and to make a lab only appointment.  Prescription drug management   30 Time spent doing chart review, history and exam, documentation and further activities per the note           Return in about 4 weeks (around 11/29/2022).   Follow-up Visit   Expected date:  Dec 01, 2022 (Approximate)      Follow Up Appointment Details:     Follow-up with whom?: Me    Follow-Up for what?: Chronic Disease f/u    Chronic Disease f/u:  Anxiety  Asthma  Depression       How?: Video                    Adriana Jimenez MD  [facility]    Subjective   Yady is a 21 year old accompanied by her ., presenting for the following health issues:   Follow Up and Recheck Medication      History of Present Illness       She eats 4 or more servings of fruits and  vegetables daily.She consumes 0 sweetened beverage(s) daily.She exercises with enough effort to increase her heart rate 30 to 60 minutes per day.  She exercises with enough effort to increase her heart rate 3 or less days per week.   She is taking medications regularly.    Today's PHQ-9         PHQ-9 Total Score: 6    PHQ-9 Q9 Thoughts of better off dead/self-harm past 2 weeks :   Not at all      Today's ROLLY-7 Score: 6     Feeling much better.  No routine excercise and that maybe a big factor- tied to her MH.    Depression and Anxiety Follow-Up    How are you doing with your depression since your last visit? Improved     How are you doing with your anxiety since your last visit?  Improved     Are you having other symptoms that might be associated with depression or anxiety? No    Have you had a significant life event? No     Do you have any concerns with your use of alcohol or other drugs? No    Social History     Tobacco Use     Smoking status: Never     Smokeless tobacco: Never   Vaping Use     Vaping Use: Never used   Substance Use Topics     Alcohol use: Yes     Drug use: Never     PHQ 8/8/2022 9/13/2022 11/1/2022   PHQ-9 Total Score 7 11 5   Q9: Thoughts of better off dead/self-harm past 2 weeks Not at all Not at all Not at all   F/U: Thoughts of suicide or self-harm - - -   F/U: Self harm-plan - - -   F/U: Self-harm action - - -   F/U: Safety concerns - - -     ROLLY-7 SCORE 8/8/2022 9/13/2022 11/1/2022   Total Score - 8 (mild anxiety) 6 (mild anxiety)   Total Score 12 8 6       asthma- feels jittery after breo  flovent was not covered.  breo still costly  Multiple triggers including yr round allergies  Over the counter antihistamine- uses claritin only when feels asthma not controlled.  Review of Systems   Constitutional, HEENT, cardiovascular, pulmonary, GI, , musculoskeletal, neuro, skin, endocrine and psych systems are negative, except as otherwise noted.      Objective           Vitals:  No vitals were  obtained today due to virtual visit.    Physical Exam   GENERAL: Healthy, alert and no distress  EYES: Eyes grossly normal to inspection.  No discharge or erythema, or obvious scleral/conjunctival abnormalities.  RESP: No audible wheeze, cough, or visible cyanosis.  No visible retractions or increased work of breathing.    SKIN: Visible skin clear. No significant rash, abnormal pigmentation or lesions.  NEURO: Cranial nerves grossly intact.  Mentation and speech appropriate for age.  PSYCH: Mentation appears normal, affect normal/bright, judgement and insight intact, normal speech and appearance well-groomed.      Video-Visit Details    Video Start Time: 9:28 AM    Type of service:  Video Visit    Video End Time:9:53 AM    Originating Location (pt. Location): Home  Distant Location (provider location):  On-site    Platform used for Video Visit: [employer]

## 2022-11-01 NOTE — PATIENT INSTRUCTIONS
Follow up on phone or video in 4 weeks  Changed to - fluticasone (ARNUITY ELLIPTA) 100 MCG/ACT inhaler; Inhale 1 puff into the lungs daily. Refills given . Wash mouth after use  Add - montelukast (SINGULAIR) 10 MG tablet; Take 1 tablet (10 mg) by mouth At Bedtime

## 2022-11-01 NOTE — TELEPHONE ENCOUNTER
A.S.      Order/Referral Request    Patient called.      Orders being requested: TB Quant for work. States forgot to ask you at time of VV today    Does patient have an appointment scheduled?: No. Will call patient when order signed and schedule lab only    Could we send this information to you in [employer] or would you prefer to receive a phone call?:   Patient would prefer a phone call   Okay to leave a detailed message?: No at Cell number on file:    Telephone Information:   Mobile [phone]     Order T'd up.    Thank you,  Samantha García, RN

## 2022-11-03 ENCOUNTER — LAB (OUTPATIENT)
Dept: LAB | Facility: CLINIC | Age: 22
End: 2022-11-03
Payer: COMMERCIAL

## 2022-11-03 DIAGNOSIS — Z11.1 SCREENING EXAMINATION FOR PULMONARY TUBERCULOSIS: ICD-10-CM

## 2022-11-03 PROCEDURE — 86481 TB AG RESPONSE T-CELL SUSP: CPT

## 2022-11-03 PROCEDURE — 36415 COLL VENOUS BLD VENIPUNCTURE: CPT

## 2022-11-05 LAB
GAMMA INTERFERON BACKGROUND BLD IA-ACNC: 0.05 IU/ML
M TB IFN-G BLD-IMP: NEGATIVE
M TB IFN-G CD4+ BCKGRND COR BLD-ACNC: 9.95 IU/ML
MITOGEN IGNF BCKGRD COR BLD-ACNC: -0.01 IU/ML
MITOGEN IGNF BCKGRD COR BLD-ACNC: -0.02 IU/ML
QUANTIFERON MITOGEN: 10 IU/ML
QUANTIFERON NIL TUBE: 0.05 IU/ML
QUANTIFERON TB1 TUBE: 0.03 IU/ML
QUANTIFERON TB2 TUBE: 0.04

## 2022-11-29 ENCOUNTER — VIRTUAL VISIT (OUTPATIENT)
Dept: FAMILY MEDICINE | Facility: CLINIC | Age: 22
End: 2022-11-29
Payer: COMMERCIAL

## 2022-11-29 DIAGNOSIS — F43.23 ADJUSTMENT DISORDER WITH MIXED ANXIETY AND DEPRESSED MOOD: ICD-10-CM

## 2022-11-29 DIAGNOSIS — J45.30 MILD PERSISTENT ASTHMA WITHOUT COMPLICATION: ICD-10-CM

## 2022-11-29 PROCEDURE — 99214 OFFICE O/P EST MOD 30 MIN: CPT | Mod: 95 | Performed by: FAMILY MEDICINE

## 2022-11-29 PROCEDURE — 96127 BRIEF EMOTIONAL/BEHAV ASSMT: CPT | Mod: 95 | Performed by: FAMILY MEDICINE

## 2022-11-29 RX ORDER — FLUOXETINE 40 MG/1
40 CAPSULE ORAL DAILY
Qty: 30 CAPSULE | Refills: 11 | Status: SHIPPED | OUTPATIENT
Start: 2022-11-29 | End: 2023-09-29

## 2022-11-29 ASSESSMENT — ANXIETY QUESTIONNAIRES
3. WORRYING TOO MUCH ABOUT DIFFERENT THINGS: NOT AT ALL
5. BEING SO RESTLESS THAT IT IS HARD TO SIT STILL: SEVERAL DAYS
8. IF YOU CHECKED OFF ANY PROBLEMS, HOW DIFFICULT HAVE THESE MADE IT FOR YOU TO DO YOUR WORK, TAKE CARE OF THINGS AT HOME, OR GET ALONG WITH OTHER PEOPLE?: SOMEWHAT DIFFICULT
4. TROUBLE RELAXING: NOT AT ALL
GAD7 TOTAL SCORE: 2
IF YOU CHECKED OFF ANY PROBLEMS ON THIS QUESTIONNAIRE, HOW DIFFICULT HAVE THESE PROBLEMS MADE IT FOR YOU TO DO YOUR WORK, TAKE CARE OF THINGS AT HOME, OR GET ALONG WITH OTHER PEOPLE: SOMEWHAT DIFFICULT
7. FEELING AFRAID AS IF SOMETHING AWFUL MIGHT HAPPEN: NOT AT ALL
2. NOT BEING ABLE TO STOP OR CONTROL WORRYING: NOT AT ALL
6. BECOMING EASILY ANNOYED OR IRRITABLE: NOT AT ALL
7. FEELING AFRAID AS IF SOMETHING AWFUL MIGHT HAPPEN: NOT AT ALL
GAD7 TOTAL SCORE: 2
1. FEELING NERVOUS, ANXIOUS, OR ON EDGE: SEVERAL DAYS
GAD7 TOTAL SCORE: 2

## 2022-11-29 ASSESSMENT — PATIENT HEALTH QUESTIONNAIRE - PHQ9
10. IF YOU CHECKED OFF ANY PROBLEMS, HOW DIFFICULT HAVE THESE PROBLEMS MADE IT FOR YOU TO DO YOUR WORK, TAKE CARE OF THINGS AT HOME, OR GET ALONG WITH OTHER PEOPLE: NOT DIFFICULT AT ALL
SUM OF ALL RESPONSES TO PHQ QUESTIONS 1-9: 2
SUM OF ALL RESPONSES TO PHQ QUESTIONS 1-9: 2

## 2022-11-29 NOTE — PROGRESS NOTES
"Yady is a 22 year old who is being evaluated via a billable video visit.      How would you like to obtain your AVS? MyChart  If the video visit is dropped, the invitation should be resent by: Text to cell phone: [phone]  Will anyone else be joining your video visit? No          Assessment & Plan     Adjustment disorder with mixed anxiety and depressed mood  Plan: Stable on increased dose of fluoxetine  - FLUoxetine (PROZAC) 40 MG capsule; Take 1 capsule (40 mg) by mouth daily  Refill given for 1 year.  She is also a runner and exercise helps her mood as well.  ROLLY-7 SCORE 9/13/2022 11/1/2022 11/29/2022   Total Score 8 (mild anxiety) 6 (mild anxiety) 2 (minimal anxiety)   Total Score 8 6 2     PHQ 9/13/2022 11/1/2022 11/29/2022   PHQ-9 Total Score 11 5 2   Q9: Thoughts of better off dead/self-harm past 2 weeks Not at all Not at all Not at all   F/U: Thoughts of suicide or self-harm - - -   F/U: Self harm-plan - - -   F/U: Self-harm action - - -   F/U: Safety concerns - - -   follow up in 1-3 months if needed     Mild persistent asthma without complication  Plan.  Well-controlled on Arnuity.  Palpitation has improved since discontinued Advair.  Does not use montelukast daily, does use it if needed.  Does not need refill.                 No follow-ups on file.   Follow-up Visit   Expected date:  Dec 29, 2022 (Approximate)      Follow Up Appointment Details:     Follow-up with whom?: Me    Follow-Up for what?: Other (Office Visit)    How?: Telephone    Is this an as-needed follow-up?: No                    Adriana Jimenez MD  [facility] [place]    Subjective   Yady is a 22 year old, presenting for the following health issues:  Asthma and Depression      HPI     Depression and Anxiety Follow-Up    How are you doing with your depression since your last visit?  :[id number]::\"No change\"}    How are you doing with your anxiety since your last visit?  No change    Are you having other symptoms that might " be associated with depression or anxiety? No    Have you had a significant life event? Job Concerns     Do you have any concerns with your use of alcohol or other drugs? No  Working as a scribe.  Enjoys the work.  Anxiety is better with higher dose of Prozac 40 mg a day.  Still gets some low mood and anxiety but its way better.  Does not want to change the medication dose.  she denies suicidal thoughts or ideation.reports no side effects from medications. Would like to continue.      Social History     Tobacco Use     Smoking status: Never     Smokeless tobacco: Never   Vaping Use     Vaping Use: Never used   Substance Use Topics     Alcohol use: Yes     Drug use: Never     PHQ 9/13/2022 11/1/2022 11/29/2022   PHQ-9 Total Score 11 5 2   Q9: Thoughts of better off dead/self-harm past 2 weeks Not at all Not at all Not at all   F/U: Thoughts of suicide or self-harm - - -   F/U: Self harm-plan - - -   F/U: Self-harm action - - -   F/U: Safety concerns - - -     ROLLY-7 SCORE 9/13/2022 11/1/2022 11/29/2022   Total Score 8 (mild anxiety) 6 (mild anxiety) 2 (minimal anxiety)   Total Score 8 6 2         Suicide Assessment Five-step Evaluation and Treatment (SAFE-T)    Asthma Follow-Up    Was ACT completed today?  ACT/AAP completed.  Arnuity is well-tolerated.  Does not need and use Singulair daily.  Palpitation has resolved since she stopped Advair.    How many servings of fruits and vegetables do you eat daily?  4 or more    On average, how many sweetened beverages do you drink each day (Examples: soda, juice, sweet tea, etc.  Do NOT count diet or artificially sweetened beverages)?   0    How many days per week do you exercise enough to make your heart beat faster? 5    How many minutes a day do you exercise enough to make your heart beat faster? 30 - 60    How many days per week do you miss taking your medication? 0      Review of Systems   Constitutional, HEENT, cardiovascular, pulmonary, GI, , musculoskeletal, neuro,  skin, endocrine and psych systems are negative, except as otherwise noted.      Objective           Vitals:  No vitals were obtained today due to virtual visit.    Physical Exam   GENERAL: Healthy, alert and no distress  EYES: Eyes grossly normal to inspection.  No discharge or erythema, or obvious scleral/conjunctival abnormalities.  RESP: No audible wheeze, cough, or visible cyanosis.  No visible retractions or increased work of breathing.    SKIN: Visible skin clear. No significant rash, abnormal pigmentation or lesions.  NEURO: Cranial nerves grossly intact.  Mentation and speech appropriate for age.  PSYCH: Mentation appears normal, affect normal/bright, judgement and insight intact, normal speech and appearance well-groomed.                Video-Visit Details    Video Start Time: 12:26 PM    Type of service:  Video Visit    Video End Time:12:34 PM    Originating Location (pt. Location): Home      Distant Location (provider location):  On-site    Platform used for Video Visit: [employer]

## 2022-12-08 ENCOUNTER — VIRTUAL VISIT (OUTPATIENT)
Dept: FAMILY MEDICINE | Facility: CLINIC | Age: 22
End: 2022-12-08
Payer: COMMERCIAL

## 2022-12-08 DIAGNOSIS — J06.9 UPPER RESPIRATORY INFECTION, VIRAL: Primary | ICD-10-CM

## 2022-12-08 PROCEDURE — 99213 OFFICE O/P EST LOW 20 MIN: CPT | Mod: 95 | Performed by: FAMILY MEDICINE

## 2022-12-08 NOTE — LETTER
FOR: Work         RE: Yady Sellers  : 21              Yady had a virtual visit with me today 22.  Please excuse from work   22-22.  She is ok to return to work 12/10/22if remains fever and symptoms free .             Sincerely,      Adriana Jimenez MD

## 2022-12-08 NOTE — PROGRESS NOTES
Yady is a 22 year old who is being evaluated via a billable video visit.      How would you like to obtain your AVS? MyChart  If the video visit is dropped, the invitation should be resent by:   Will anyone else be joining your video visit? No          Assessment & Plan     Upper respiratory infection, viral  Plan: relative rest.  Good hydration.  excuse from  Work for 3 days  OK to return to work letter in my chart.  If fever or concerns- follow up as needed         Adriana Jimenez MD  [facility]   Yady is a 22 year old, presenting for the following health issues:  Fever and Patient Request for Note/Letter      HPI         This week sore throat 5 days ago.  Yesterday vomited once. Sore throat resolved .  Yesterday fever up to 38, chills and sweating through the night.  Doing better this pm, still chills .  2 covid test negative   Visiting college friends - Friday- Tuesday.  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      Objective           Vitals:  No vitals were obtained today due to virtual visit.    Physical Exam   GENERAL: Healthy, alert and no distress  EYES: Eyes grossly normal to inspection.  No discharge or erythema, or obvious scleral/conjunctival abnormalities.  RESP: No audible wheeze, cough, or visible cyanosis.  No visible retractions or increased work of breathing.    SKIN: Visible skin clear. No significant rash, abnormal pigmentation or lesions.  NEURO: Cranial nerves grossly intact.  Mentation and speech appropriate for age.  PSYCH: Mentation appears normal, affect normal/bright, judgement and insight intact, normal speech and appearance well-groomed.                Video-Visit  Details    Video Start Time: 3:10 PM    Type of service:  Video Visit    Video End Time:3:20 PM    Originating Location (pt. Location): Home      Distant Location (provider location):  On-site    Platform used for Video Visit: Trisha

## 2023-03-14 ENCOUNTER — VIRTUAL VISIT (OUTPATIENT)
Dept: FAMILY MEDICINE | Facility: CLINIC | Age: 23
End: 2023-03-14
Payer: COMMERCIAL

## 2023-03-14 DIAGNOSIS — J45.30 MILD PERSISTENT ASTHMA WITHOUT COMPLICATION: ICD-10-CM

## 2023-03-14 DIAGNOSIS — R22.9 LOCALIZED SUPERFICIAL SWELLING, MASS, OR LUMP: Primary | ICD-10-CM

## 2023-03-14 DIAGNOSIS — L70.0 ACNE VULGARIS: ICD-10-CM

## 2023-03-14 PROCEDURE — 99213 OFFICE O/P EST LOW 20 MIN: CPT | Mod: VID | Performed by: FAMILY MEDICINE

## 2023-03-14 ASSESSMENT — ANXIETY QUESTIONNAIRES
IF YOU CHECKED OFF ANY PROBLEMS ON THIS QUESTIONNAIRE, HOW DIFFICULT HAVE THESE PROBLEMS MADE IT FOR YOU TO DO YOUR WORK, TAKE CARE OF THINGS AT HOME, OR GET ALONG WITH OTHER PEOPLE: SOMEWHAT DIFFICULT
7. FEELING AFRAID AS IF SOMETHING AWFUL MIGHT HAPPEN: SEVERAL DAYS
GAD7 TOTAL SCORE: 3
1. FEELING NERVOUS, ANXIOUS, OR ON EDGE: SEVERAL DAYS
8. IF YOU CHECKED OFF ANY PROBLEMS, HOW DIFFICULT HAVE THESE MADE IT FOR YOU TO DO YOUR WORK, TAKE CARE OF THINGS AT HOME, OR GET ALONG WITH OTHER PEOPLE?: SOMEWHAT DIFFICULT
GAD7 TOTAL SCORE: 3
7. FEELING AFRAID AS IF SOMETHING AWFUL MIGHT HAPPEN: SEVERAL DAYS
5. BEING SO RESTLESS THAT IT IS HARD TO SIT STILL: NOT AT ALL
3. WORRYING TOO MUCH ABOUT DIFFERENT THINGS: NOT AT ALL
6. BECOMING EASILY ANNOYED OR IRRITABLE: NOT AT ALL
GAD7 TOTAL SCORE: 3
2. NOT BEING ABLE TO STOP OR CONTROL WORRYING: NOT AT ALL
4. TROUBLE RELAXING: SEVERAL DAYS

## 2023-03-14 ASSESSMENT — PATIENT HEALTH QUESTIONNAIRE - PHQ9
SUM OF ALL RESPONSES TO PHQ QUESTIONS 1-9: 4
SUM OF ALL RESPONSES TO PHQ QUESTIONS 1-9: 4
10. IF YOU CHECKED OFF ANY PROBLEMS, HOW DIFFICULT HAVE THESE PROBLEMS MADE IT FOR YOU TO DO YOUR WORK, TAKE CARE OF THINGS AT HOME, OR GET ALONG WITH OTHER PEOPLE: NOT DIFFICULT AT ALL

## 2023-03-14 ASSESSMENT — ASTHMA QUESTIONNAIRES
ACT_TOTALSCORE: 23
QUESTION_1 LAST FOUR WEEKS HOW MUCH OF THE TIME DID YOUR ASTHMA KEEP YOU FROM GETTING AS MUCH DONE AT WORK, SCHOOL OR AT HOME: NONE OF THE TIME
QUESTION_4 LAST FOUR WEEKS HOW OFTEN HAVE YOU USED YOUR RESCUE INHALER OR NEBULIZER MEDICATION (SUCH AS ALBUTEROL): NOT AT ALL
QUESTION_2 LAST FOUR WEEKS HOW OFTEN HAVE YOU HAD SHORTNESS OF BREATH: ONCE OR TWICE A WEEK
QUESTION_5 LAST FOUR WEEKS HOW WOULD YOU RATE YOUR ASTHMA CONTROL: WELL CONTROLLED
QUESTION_3 LAST FOUR WEEKS HOW OFTEN DID YOUR ASTHMA SYMPTOMS (WHEEZING, COUGHING, SHORTNESS OF BREATH, CHEST TIGHTNESS OR PAIN) WAKE YOU UP AT NIGHT OR EARLIER THAN USUAL IN THE MORNING: NOT AT ALL
ACT_TOTALSCORE: 23

## 2023-03-14 NOTE — PROGRESS NOTES
Yady is a 22 year old who is being evaluated via a billable video visit.      How would you like to obtain your AVS? MyChart  If the video visit is dropped, the invitation should be resent by:   Will anyone else be joining your video visit? No          Assessment & Plan     Localized superficial swelling, mass, or lump  Plan: Noted by patient in neck region and slightly increased in size.  Advised excisional biopsy and referral is given  - Adult General Surg Referral; Future        Asthma, anxiety,acne  All stable   Does not need refill on medications .    Adriana Jimenez MD  [facility] [place]    Subjective   Yady is a 22 year old  presenting for the following health issues:  No chief complaint on file.      HPI       Noted a lump in neck a few weeks/months ago and now more prominent.  No associated fever, sore throat, dysphagia, change in appetite.  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      Objective           Vitals:  No vitals were obtained today due to virtual visit.    Physical Exam   GENERAL: Healthy, alert and no distress  EYES: Eyes grossly normal to inspection.  No discharge or erythema, or obvious scleral/conjunctival abnormalities.  RESP: No audible wheeze, cough, or visible cyanosis.  No visible retractions or increased work of breathing.    SKIN: Visible lump in the center of the neck . No significant rash, abnormal pigmentation or lesions.  NEURO: Cranial nerves grossly intact.  Mentation and speech appropriate for age.  PSYCH: Mentation appears normal, affect normal/bright, judgement and insight intact, normal speech and appearance well-groomed.                Video-Visit Details    Type of service:  Video Visit      Originating Location (pt. Location): Home  Distant Location (provider location):  On-site  Platform used for Video Visit: Trisha

## 2023-03-17 ENCOUNTER — OFFICE VISIT (OUTPATIENT)
Dept: SURGERY | Facility: CLINIC | Age: 23
End: 2023-03-17
Payer: COMMERCIAL

## 2023-03-17 ENCOUNTER — TELEPHONE (OUTPATIENT)
Dept: SURGERY | Facility: CLINIC | Age: 23
End: 2023-03-17

## 2023-03-17 VITALS
WEIGHT: 156 LBS | HEIGHT: 71 IN | SYSTOLIC BLOOD PRESSURE: 126 MMHG | DIASTOLIC BLOOD PRESSURE: 74 MMHG | BODY MASS INDEX: 21.84 KG/M2 | OXYGEN SATURATION: 95 % | RESPIRATION RATE: 18 BRPM | HEART RATE: 80 BPM

## 2023-03-17 DIAGNOSIS — R22.1 NECK MASS: Primary | ICD-10-CM

## 2023-03-17 PROCEDURE — 99202 OFFICE O/P NEW SF 15 MIN: CPT | Performed by: STUDENT IN AN ORGANIZED HEALTH CARE EDUCATION/TRAINING PROGRAM

## 2023-03-17 RX ORDER — CEFAZOLIN SODIUM/WATER 2 G/20 ML
2 SYRINGE (ML) INTRAVENOUS SEE ADMIN INSTRUCTIONS
Status: CANCELLED | OUTPATIENT
Start: 2023-03-17

## 2023-03-17 RX ORDER — CEFAZOLIN SODIUM/WATER 2 G/20 ML
2 SYRINGE (ML) INTRAVENOUS
Status: CANCELLED | OUTPATIENT
Start: 2023-03-17

## 2023-03-17 NOTE — LETTER
March 17, 2023          Adriana Jimenez MD  [address] [place] [address]      RE:   Yady Sellers 2000      Dear Colleague,    Thank you for referring your patient, Yady Sellers, to [facility] at [place] location. Please see a copy of my visit note below.    [facility] Consultation       Chief Complaint - New Lump on Front of Neck      HISTORY OF PRESENT ILLNESS:  Yady Sellers is a 22 year old female who is seen in consultation at the request of Dr. Jimenez for evaluation of new lump in front of neck.     Yady is a healthy 22-year-old female who is presenting with chief complaint of new lump in front of neck.  She reports that she first noticed this a few months ago and it appeared more like a pimple.  It has grown in size.  She denies any color changes to it.  She denies any pain associated with it.  It does not cause her trouble with swallowing or breathing.  She denies any lumps or bumps elsewhere.  She has had a prior ear tubes placed as a child.  She denies any personal family history of issues with bleeding or general anesthesia.     ASSESSMENT:  Yady Sellers is a 22 year old who presents with likely epidermoid cyst of her neck.  We discussed the possible differential of this cyst including brachial cleft cysts and thyroglossal duct cyst.  Its features of being midline, mobile, round, white, without any elevation of swallowing makes these less likely.  We discussed watchful waiting versus surgical management.  If patient was to proceed with surgery I recommend excision of cyst under MAC sedation given the location of the cyst.  We discussed risk of surgery including but not being limited to pain, bleeding, infection, seroma/hematoma for animation, and injury to surrounding structures and organs.  Patient voiced understanding would like to discuss the financial cost of the procedure first before proceeding.  This is reasonable.  If the cyst was to  grow in size between today's visit and day of surgery I would then recommend proceeding with a CT neck with contrast to fully assess the cyst.  Significant pertinent co-morbidities include: None.         PLAN:  Patient will call back and schedule surgery at her convenience.     Again, thank you for allowing me to participate in the care of your patient.      Sincerely,      Greg Winchester MD

## 2023-03-17 NOTE — TELEPHONE ENCOUNTER
Type of surgery: excision neck mass  Location of surgery: [facility]  Date and time of surgery: 4/10/23 11:40am  Surgeon: Dr Winchester  Pre-Op Appt Date: pt to schedule  Post-Op Appt Date: pt to schedule   Packet sent out: Yes  Pre-cert/Authorization completed:  Not Applicable  Date: 3/17/23

## 2023-03-17 NOTE — PROGRESS NOTES
[facility] Consultation    CHIEF COMPLAINT:  Chief Complaint   Patient presents with     Consult     New lump on front of neck...no imaging...ref Dr Jimenez...dll       HISTORY OF PRESENT ILLNESS:  Yady Sellers is a 22 year old female who is seen in consultation at the request of Dr. Jimenez for evaluation of new lump in front of neck.    Yady is a healthy 22-year-old female who is presenting with chief complaint of new lump in front of neck.  She reports that she first noticed this a few months ago and it appeared more like a pimple.  It has grown in size.  She denies any color changes to it.  She denies any pain associated with it.  It does not cause her trouble with swallowing or breathing.  She denies any lumps or bumps elsewhere.  She has had a prior ear tubes placed as a child.  She denies any personal family history of issues with bleeding or general anesthesia.    REVIEW OF SYSTEMS:  Constitutional: No fevers or chills  Eyes: No blurred or double vision  HENT: Denies headaches, No rhinorrhea, No sore throat  Respiratory: No cough or shortness of breath  Cardiovascular: Denies chest pain or palpitations  Gastrointestinal: No abdominal pain or nausea/vomiting  Genitourinary: No hematuria or dysuria  Musculoskeletal: Denies arthralgias or myalgias  Neurologic: No numbness or tingling  Integumentary: No skin rashes    Past Medical History:   Diagnosis Date     Encounter for insertion of mirena IUD        Past Surgical History:   Procedure Laterality Date     ENT SURGERY  2003    Ear tubes     MOLE REMOVAL  07/22/2021    Around vagina       Family History   Problem Relation Age of Onset     Diabetes Paternal Grandfather      Hypertension Paternal Grandfather      Heart Disease Paternal Grandfather      Cerebrovascular Disease Paternal Grandmother      Breast Cancer Paternal Grandmother      Colon Cancer Maternal Grandmother      Thyroid Disease Maternal Grandmother      Depression Mother   "    Depression Sister      Anxiety Disorder Sister      Obesity Sister      No Known Problems Father      No Known Problems Brother      Heart Disease Maternal Grandfather      Gout Maternal Grandfather      No Known Problems Other        Social History     Tobacco Use     Smoking status: Never     Smokeless tobacco: Never   Substance Use Topics     Alcohol use: Yes       Patient Active Problem List   Diagnosis     Melanocytic nevi of trunk     Numerous moles     Immunity status testing     Adjustment disorder with mixed anxiety and depressed mood     Chronic allergic rhinitis due to animal hair and dander     Mild persistent asthma without complication     Family history of thyroid disorder     Acne vulgaris     Screening examination for pulmonary tuberculosis       No Known Allergies    Current Outpatient Medications   Medication Sig Dispense Refill     albuterol (PROAIR HFA/PROVENTIL HFA/VENTOLIN HFA) 108 (90 Base) MCG/ACT inhaler Inhale 2 puffs into the lungs every 6 hours as needed for shortness of breath / dyspnea or wheezing 6.7 g 0     FLUoxetine (PROZAC) 40 MG capsule Take 1 capsule (40 mg) by mouth daily 30 capsule 11     fluticasone (ARNUITY ELLIPTA) 100 MCG/ACT inhaler Inhale 1 puff into the lungs daily 1 each 11     levonorgestrel (MIRENA) 20 MCG/24HR IUD 1 each (20 mcg) by Intrauterine route once       montelukast (SINGULAIR) 10 MG tablet Take 1 tablet (10 mg) by mouth At Bedtime 90 tablet 1     spironolactone (ALDACTONE) 100 MG tablet TAKE 1 TABLET(100 MG) BY MOUTH DAILY 30 tablet 6       Vitals: /74 (BP Location: Right arm, Patient Position: Sitting, Cuff Size: Adult Regular)   Pulse 80   Resp 18   Ht 1.803 m (5' 11\")   Wt 70.8 kg (156 lb)   SpO2 95%   BMI 21.76 kg/m    BMI= Body mass index is 21.76 kg/m .    EXAM:  General: Vital signs reviewed, in no apparent distress  Eyes: Anicteric, EOMI   HENT: Normocephalic, atraumatic, trachea midline. 1 cm round, mobile, firm, white cyst like " mass at the midline of her throat just over her thyroid cartilage.  Does not elevate or move with swallowing.  Nontender to palpation.  No palpable masses elsewhere.  Respiratory: Breathing nonlabored  Cardiovascular: Regular rate and rhythm  GI: Abdomen soft, nondistended, nontender, no organomegaly  Musculoskeletal: No gross deformities  Neurologic: Grossly nonfocal exam  Psychiatric: Normal mood, affect and insight  Integumentary: Warm and dry    All labs and imaging personally reviewed and significant for: None to review    ASSESSMENT:  Yady Sellers is a 22 year old who presents with likely epidermoid cyst of her neck.  We discussed the possible differential of this cyst including brachial cleft cysts and thyroglossal duct cyst.  Its features of being midline, mobile, round, white, without any elevation of swallowing makes these less likely.  We discussed watchful waiting versus surgical management.  If patient was to proceed with surgery I recommend excision of cyst under MAC sedation given the location of the cyst.  We discussed risk of surgery including but not being limited to pain, bleeding, infection, seroma/hematoma for animation, and injury to surrounding structures and organs.  Patient voiced understanding would like to discuss the financial cost of the procedure first before proceeding.  This is reasonable.  If the cyst was to grow in size between today's visit and day of surgery I would then recommend proceeding with a CT neck with contrast to fully assess the cyst.  Significant pertinent co-morbidities include: None.       PLAN:  Patient will call back and schedule surgery at her convenience.    It was my pleasure to participate in the care of Yady Sellers in clinic today. Thank you for this consultation.         Greg Winchester MD    Please route or send letter to:  Primary Care Provider (PCP) and Referring Provider

## 2023-04-05 ENCOUNTER — OFFICE VISIT (OUTPATIENT)
Dept: FAMILY MEDICINE | Facility: CLINIC | Age: 23
End: 2023-04-05
Payer: COMMERCIAL

## 2023-04-05 VITALS
WEIGHT: 154 LBS | RESPIRATION RATE: 18 BRPM | TEMPERATURE: 98.9 F | DIASTOLIC BLOOD PRESSURE: 80 MMHG | SYSTOLIC BLOOD PRESSURE: 118 MMHG | HEART RATE: 62 BPM | BODY MASS INDEX: 21.56 KG/M2 | OXYGEN SATURATION: 97 % | HEIGHT: 71 IN

## 2023-04-05 DIAGNOSIS — J30.81 CHRONIC ALLERGIC RHINITIS DUE TO ANIMAL HAIR AND DANDER: ICD-10-CM

## 2023-04-05 DIAGNOSIS — J45.30 MILD PERSISTENT ASTHMA WITHOUT COMPLICATION: ICD-10-CM

## 2023-04-05 DIAGNOSIS — Z01.818 PREOP GENERAL PHYSICAL EXAM: Primary | ICD-10-CM

## 2023-04-05 DIAGNOSIS — F43.23 ADJUSTMENT DISORDER WITH MIXED ANXIETY AND DEPRESSED MOOD: ICD-10-CM

## 2023-04-05 DIAGNOSIS — R22.1 NECK MASS: ICD-10-CM

## 2023-04-05 PROCEDURE — 99214 OFFICE O/P EST MOD 30 MIN: CPT | Performed by: FAMILY MEDICINE

## 2023-04-05 ASSESSMENT — PAIN SCALES - GENERAL: PAINLEVEL: NO PAIN (0)

## 2023-04-05 ASSESSMENT — ANXIETY QUESTIONNAIRES
3. WORRYING TOO MUCH ABOUT DIFFERENT THINGS: SEVERAL DAYS
6. BECOMING EASILY ANNOYED OR IRRITABLE: SEVERAL DAYS
2. NOT BEING ABLE TO STOP OR CONTROL WORRYING: SEVERAL DAYS
1. FEELING NERVOUS, ANXIOUS, OR ON EDGE: SEVERAL DAYS
GAD7 TOTAL SCORE: 7
4. TROUBLE RELAXING: SEVERAL DAYS
7. FEELING AFRAID AS IF SOMETHING AWFUL MIGHT HAPPEN: SEVERAL DAYS
8. IF YOU CHECKED OFF ANY PROBLEMS, HOW DIFFICULT HAVE THESE MADE IT FOR YOU TO DO YOUR WORK, TAKE CARE OF THINGS AT HOME, OR GET ALONG WITH OTHER PEOPLE?: SOMEWHAT DIFFICULT
7. FEELING AFRAID AS IF SOMETHING AWFUL MIGHT HAPPEN: SEVERAL DAYS
IF YOU CHECKED OFF ANY PROBLEMS ON THIS QUESTIONNAIRE, HOW DIFFICULT HAVE THESE PROBLEMS MADE IT FOR YOU TO DO YOUR WORK, TAKE CARE OF THINGS AT HOME, OR GET ALONG WITH OTHER PEOPLE: SOMEWHAT DIFFICULT
GAD7 TOTAL SCORE: 7
GAD7 TOTAL SCORE: 7
5. BEING SO RESTLESS THAT IT IS HARD TO SIT STILL: SEVERAL DAYS

## 2023-04-05 ASSESSMENT — PATIENT HEALTH QUESTIONNAIRE - PHQ9
10. IF YOU CHECKED OFF ANY PROBLEMS, HOW DIFFICULT HAVE THESE PROBLEMS MADE IT FOR YOU TO DO YOUR WORK, TAKE CARE OF THINGS AT HOME, OR GET ALONG WITH OTHER PEOPLE: SOMEWHAT DIFFICULT
SUM OF ALL RESPONSES TO PHQ QUESTIONS 1-9: 7
SUM OF ALL RESPONSES TO PHQ QUESTIONS 1-9: 7

## 2023-04-05 NOTE — PATIENT INSTRUCTIONS
For informational purposes only. Not to replace the advice of your health care provider. Copyright   2003,  [facility]. All rights reserved. Clinically reviewed by Brittany Matute MD. [employer] [id number] - REV .  Preparing for Your Surgery  Getting started  A nurse will call you to review your health history and instructions. They will give you an arrival time based on your scheduled surgery time. Please be ready to share:    Your doctor's clinic name and phone number    Your medical, surgical, and anesthesia history    A list of allergies and sensitivities    A list of medicines, including herbal treatments and over-the-counter drugs    Whether the patient has a legal guardian (ask how to send us the papers in advance)  Please tell us if you're pregnant--or if there's any chance you might be pregnant. Some surgeries may injure a fetus (unborn baby), so they require a pregnancy test. Surgeries that are safe for a fetus don't always need a test, and you can choose whether to have one.   If you have a child who's having surgery, please ask for a copy of Preparing for Your Child's Surgery.    Preparing for surgery    Within 10 to 30 days of surgery: Have a pre-op exam (sometimes called an H&P, or History and Physical). This can be done at a clinic or pre-operative center.  ? If you're having a , you may not need this exam. Talk to your care team.    At your pre-op exam, talk to your care team about all medicines you take. If you need to stop any medicines before surgery, ask when to start taking them again.  ? We do this for your safety. Many medicines can make you bleed too much during surgery. Some change how well surgery (anesthesia) drugs work.    Call your insurance company to let them know you're having surgery. (If you don't have insurance, call [phone].)    Call your clinic if there's any change in your health. This includes signs of a cold or flu (sore throat, runny nose,  cough, rash, fever). It also includes a scrape or scratch near the surgery site.    If you have questions on the day of surgery, call your hospital or surgery center.  Eating and drinking guidelines  For your safety: Unless your surgeon tells you otherwise, follow the guidelines below.    Eat and drink as usual until 8 hours before you arrive for surgery. After that, no food or milk.    Drink clear liquids until 2 hours before you arrive. These are liquids you can see through, like water, Gatorade, and Propel Water. They also include plain black coffee and tea (no cream or milk), candy, and breath mints. You can spit out gum when you arrive.    If you drink alcohol: Stop drinking it the night before surgery.    If your care team tells you to take medicine on the morning of surgery, it's okay to take it with a sip of water.  Preventing infection    Shower or bathe the night before and morning of your surgery. Follow the instructions your clinic gave you. (If no instructions, use regular soap.)    Don't shave or clip hair near your surgery site. We'll remove the hair if needed.    Don't smoke or vape the morning of surgery. You may chew nicotine gum up to 2 hours before surgery. A nicotine patch is okay.  ? Note: Some surgeries require you to completely quit smoking and nicotine. Check with your surgeon.    Your care team will make every effort to keep you safe from infection. We will:  ? Clean our hands often with soap and water (or an alcohol-based hand rub).  ? Clean the skin at your surgery site with a special soap that kills germs.  ? Give you a special gown to keep you warm. (Cold raises the risk of infection.)  ? Wear special hair covers, masks, gowns and gloves during surgery.  ? Give antibiotic medicine, if prescribed. Not all surgeries need antibiotics.  What to bring on the day of surgery    Photo ID and insurance card    Copy of your health care directive, if you have one    Glasses and hearing aids (bring  cases)  ? You can't wear contacts during surgery    Inhaler and eye drops, if you use them (tell us about these when you arrive)    CPAP machine or breathing device, if you use them    A few personal items, if spending the night    If you have . . .  ? A pacemaker, ICD (cardiac defibrillator) or other implant: Bring the ID card.  ? An implanted stimulator: Bring the remote control.  ? A legal guardian: Bring a copy of the certified (court-stamped) guardianship papers.  Please remove any jewelry, including body piercings. Leave jewelry and other valuables at home.  If you're going home the day of surgery    You must have a responsible adult drive you home. They should stay with you overnight as well.    If you don't have someone to stay with you, and you aren't safe to go home alone, we may keep you overnight. Insurance often won't pay for this.  After surgery  If it's hard to control your pain or you need more pain medicine, please call your surgeon's office.  Questions?   If you have any questions for your care team, list them here: _________________________________________________________________________________________________________________________________________________________________________ ____________________________________ ____________________________________ ____________________________________

## 2023-04-05 NOTE — H&P (VIEW-ONLY)
[facility] [place]  [address], [address]  [facility] [address]  Phone: [phone]  Primary Provider: Adriana Srinivasan  Pre-op Performing Provider: ADRIANA SRINIVASAN      PREOPERATIVE EVALUATION:  Today's date: 4/5/2023    Yady Sellers is a 22 year old female who presents for a preoperative evaluation.       View : No data to display.              Surgical Information:  Surgery/Procedure: EXCISION NECK MASS  Surgery Location: [facility]  Surgeon: Greg Winchester MD  Surgery Date: 4/10/23  Time of Surgery: 11:40 AM  Where patient plans to recover: At home with family  Fax number for surgical facility: Note does not need to be faxed, will be available electronically in Epic.    Answers for HPI/ROS submitted by the patient on 4/5/2023  If you checked off any problems, how difficult have these problems made it for you to do your work, take care of things at home, or get along with other people?: Somewhat difficult  PHQ9 TOTAL SCORE: 7  ROLLY 7 TOTAL SCORE: 7      Assessment & Plan     The proposed surgical procedure is considered LOW risk.    Preop general physical exam  Neck mass  Plan: excisional biopsy 4/10/23    Adjustment disorder with mixed anxiety and depressed mood  Plan: continue prozac 40mg once daily     Mild persistent asthma without complication  Plan:Well-controlled asthma on fluticasone and montelukast.    Chronic allergic rhinitis due to animal hair and dander  Plan:OTC antihistamine         Risks and Recommendations:  The patient has the following additional risks and recommendations for perioperative complications:   - No identified additional risk factors other than previously addressed    Medication Instructions:  Patient is to take all scheduled medications on the day of surgery   - ibuprofen (Advil, Motrin): HOLD 1 day before surgery.     RECOMMENDATION:  APPROVAL GIVEN to proceed with proposed procedure, without further diagnostic  evaluation.      I spent a total of 30 minutes on the day of the visit.   Time spent by me doing chart review, history and exam, documentation and further activities per the note      Subjective     HPI related to upcoming procedure: Neck mass noted by patient, seem to be increasing in size.  Surgical consultation 317.  Excisional biopsy is advised and scheduled for 10th April.        4/5/2023    12:04 PM   Preop Questions   1. Have you ever had a heart attack or stroke? No   2. Have you ever had surgery on your heart or blood vessels, such as a stent placement, a coronary artery bypass, or surgery on an artery in your head, neck, heart, or legs? No   3. Do you have chest pain with activity? No   4. Do you have a history of  heart failure? No   5. Do you currently have a cold, bronchitis or symptoms of other infection? No   6. Do you have a cough, shortness of breath, or wheezing? No   7. Do you or anyone in your family have previous history of blood clots? No   8. Do you or does anyone in your family have a serious bleeding problem such as prolonged bleeding following surgeries or cuts? No   9. Have you ever had problems with anemia or been told to take iron pills? No   10. Have you had any abnormal blood loss such as black, tarry or bloody stools, or abnormal vaginal bleeding? No   11. Have you ever had a blood transfusion? No   12. Are you willing to have a blood transfusion if it is medically needed before, during, or after your surgery? Yes   13. Have you or any of your relatives ever had problems with anesthesia? No   14. Do you have sleep apnea, excessive snoring or daytime drowsiness? No   15. Do you have any artifical heart valves or other implanted medical devices like a pacemaker, defibrillator, or continuous glucose monitor? No   16. Do you have artificial joints? No   17. Are you allergic to latex? No   18. Is there any chance that you may be pregnant? No       Health Care Directive:  Patient does not  have a Health Care Directive or Living Will: Discussed advance care planning with patient; information given to patient to review.    Preoperative Review of :   reviewed - no record of controlled substances prescribed.      Status of Chronic Conditions:  ASTHMA - Patient has a longstanding history of moderate-severe Asthma . Patient has been doing well overall noting NO SYMPTOMS and continues on medication regimen consisting of fluticasone without adverse reactions or side effects.     DEPRESSION - Patient has a long history of Depression of moderate severity requiring medication for control with recent symptoms being stable..Current symptoms of depression include none.       Review of Systems  Constitutional, neuro, ENT, endocrine, pulmonary, cardiac, gastrointestinal, genitourinary, musculoskeletal, integument and psychiatric systems are negative, except as otherwise noted.    Patient Active Problem List    Diagnosis Date Noted     Screening examination for pulmonary tuberculosis 11/01/2022     Priority: Medium     Acne vulgaris 09/13/2022     Priority: Medium     add sprinolactone. BMP today       Family history of thyroid disorder 08/16/2022     Priority: Medium     grand mom       Mild persistent asthma without complication 02/09/2021     Priority: Medium     flovent has helped  was not taking singular and resumed and it helped        Chronic allergic rhinitis due to animal hair and dander 10/19/2020     Priority: Medium     Adjustment disorder with mixed anxiety and depressed mood 07/07/2020     Priority: Medium     Numerous moles 06/25/2018     Priority: Medium     Immunity status testing 06/25/2018     Priority: Medium     Melanocytic nevi of trunk 12/19/2013     Priority: Medium      Past Medical History:   Diagnosis Date     Depressive disorder 8/15/2020     Encounter for insertion of mirena IUD      Uncomplicated asthma 3/20/2018     Past Surgical History:   Procedure Laterality Date     ENT SURGERY  " 2003    Ear tubes     MOLE REMOVAL  07/22/2021    Around vagina     Current Outpatient Medications   Medication Sig Dispense Refill     albuterol (PROAIR HFA/PROVENTIL HFA/VENTOLIN HFA) 108 (90 Base) MCG/ACT inhaler Inhale 2 puffs into the lungs every 6 hours as needed for shortness of breath / dyspnea or wheezing 6.7 g 0     FLUoxetine (PROZAC) 40 MG capsule Take 1 capsule (40 mg) by mouth daily 30 capsule 11     fluticasone (ARNUITY ELLIPTA) 100 MCG/ACT inhaler Inhale 1 puff into the lungs daily 1 each 11     levonorgestrel (MIRENA) 20 MCG/24HR IUD 1 each (20 mcg) by Intrauterine route once       montelukast (SINGULAIR) 10 MG tablet Take 1 tablet (10 mg) by mouth At Bedtime 90 tablet 1     spironolactone (ALDACTONE) 100 MG tablet TAKE 1 TABLET(100 MG) BY MOUTH DAILY 30 tablet 6       No Known Allergies     Social History     Tobacco Use     Smoking status: Never     Smokeless tobacco: Never   Vaping Use     Vaping status: Never Used   Substance Use Topics     Alcohol use: Yes     Family History   Problem Relation Age of Onset     Diabetes Paternal Grandfather      Hypertension Paternal Grandfather      Heart Disease Paternal Grandfather      Cerebrovascular Disease Paternal Grandmother      Breast Cancer Paternal Grandmother      Colon Cancer Maternal Grandmother      Thyroid Disease Maternal Grandmother      Depression Mother      Depression Sister      Anxiety Disorder Sister      Obesity Sister      No Known Problems Father      No Known Problems Brother      Heart Disease Maternal Grandfather      Gout Maternal Grandfather      No Known Problems Other      History   Drug Use Unknown         Objective     /80   Pulse 62   Temp 98.9  F (37.2  C) (Temporal)   Resp 18   Ht 1.803 m (5' 11\")   Wt 69.9 kg (154 lb)   SpO2 97%   BMI 21.48 kg/m      Physical Exam    GENERAL APPEARANCE: healthy, alert and no distress     EYES: EOMI, PERRL     HENT: ear canals and TM's normal and nose and mouth without " ulcers or lesions     NECK: no adenopathy and mass about 1cms soft palpabe     RESP: lungs clear to auscultation - no rales, rhonchi or wheezes     CV: regular rates and rhythm, normal S1 S2, no S3 or S4 and no murmur, click or rub     ABDOMEN:  soft, nontender, no HSM or masses and bowel sounds normal     MS: extremities normal- no gross deformities noted, no evidence of inflammation in joints, FROM in all extremities.     SKIN: no suspicious lesions or rashes     NEURO: Normal strength and tone, sensory exam grossly normal, mentation intact and speech normal     PSYCH: mentation appears normal. and affect normal/bright     LYMPHATICS: No cervical adenopathy    Recent Labs   Lab Test 08/08/22  1359      POTASSIUM 4.6   CR 0.89        Diagnostics:  No labs were ordered during this visit.   No EKG required for low risk surgery (cataract, skin procedure, breast biopsy, etc).  No EKG required, no history of coronary heart disease, significant arrhythmia, peripheral arterial disease or other structural heart disease.    Revised Cardiac Risk Index (RCRI):  The patient has the following serious cardiovascular risks for perioperative complications:   - No serious cardiac risks = 0 points     RCRI Interpretation: 0 points: Class I (very low risk - 0.4% complication rate)           Signed Electronically by: Adriana Jimenez MD  Copy of this evaluation report is provided to requesting physician.

## 2023-04-05 NOTE — PROGRESS NOTES
[facility] [place]  [address], [address]  [facility] [address]  Phone: [phone]  Primary Provider: Adriana Srinivasan  Pre-op Performing Provider: ADRIANA SRINIVASAN      PREOPERATIVE EVALUATION:  Today's date: 4/5/2023    Yady Sellers is a 22 year old female who presents for a preoperative evaluation.       View : No data to display.              Surgical Information:  Surgery/Procedure: EXCISION NECK MASS  Surgery Location: [facility]  Surgeon: Greg Winchester MD  Surgery Date: 4/10/23  Time of Surgery: 11:40 AM  Where patient plans to recover: At home with family  Fax number for surgical facility: Note does not need to be faxed, will be available electronically in Epic.    Answers for HPI/ROS submitted by the patient on 4/5/2023  If you checked off any problems, how difficult have these problems made it for you to do your work, take care of things at home, or get along with other people?: Somewhat difficult  PHQ9 TOTAL SCORE: 7  ROLLY 7 TOTAL SCORE: 7      Assessment & Plan     The proposed surgical procedure is considered LOW risk.    Preop general physical exam  Neck mass  Plan: excisional biopsy 4/10/23    Adjustment disorder with mixed anxiety and depressed mood  Plan: continue prozac 40mg once daily     Mild persistent asthma without complication  Plan:Well-controlled asthma on fluticasone and montelukast.    Chronic allergic rhinitis due to animal hair and dander  Plan:OTC antihistamine         Risks and Recommendations:  The patient has the following additional risks and recommendations for perioperative complications:   - No identified additional risk factors other than previously addressed    Medication Instructions:  Patient is to take all scheduled medications on the day of surgery   - ibuprofen (Advil, Motrin): HOLD 1 day before surgery.     RECOMMENDATION:  APPROVAL GIVEN to proceed with proposed procedure, without further diagnostic  evaluation.      I spent a total of 30 minutes on the day of the visit.   Time spent by me doing chart review, history and exam, documentation and further activities per the note      Subjective     HPI related to upcoming procedure: Neck mass noted by patient, seem to be increasing in size.  Surgical consultation 317.  Excisional biopsy is advised and scheduled for 10th April.        4/5/2023    12:04 PM   Preop Questions   1. Have you ever had a heart attack or stroke? No   2. Have you ever had surgery on your heart or blood vessels, such as a stent placement, a coronary artery bypass, or surgery on an artery in your head, neck, heart, or legs? No   3. Do you have chest pain with activity? No   4. Do you have a history of  heart failure? No   5. Do you currently have a cold, bronchitis or symptoms of other infection? No   6. Do you have a cough, shortness of breath, or wheezing? No   7. Do you or anyone in your family have previous history of blood clots? No   8. Do you or does anyone in your family have a serious bleeding problem such as prolonged bleeding following surgeries or cuts? No   9. Have you ever had problems with anemia or been told to take iron pills? No   10. Have you had any abnormal blood loss such as black, tarry or bloody stools, or abnormal vaginal bleeding? No   11. Have you ever had a blood transfusion? No   12. Are you willing to have a blood transfusion if it is medically needed before, during, or after your surgery? Yes   13. Have you or any of your relatives ever had problems with anesthesia? No   14. Do you have sleep apnea, excessive snoring or daytime drowsiness? No   15. Do you have any artifical heart valves or other implanted medical devices like a pacemaker, defibrillator, or continuous glucose monitor? No   16. Do you have artificial joints? No   17. Are you allergic to latex? No   18. Is there any chance that you may be pregnant? No       Health Care Directive:  Patient does not  have a Health Care Directive or Living Will: Discussed advance care planning with patient; information given to patient to review.    Preoperative Review of :   reviewed - no record of controlled substances prescribed.      Status of Chronic Conditions:  ASTHMA - Patient has a longstanding history of moderate-severe Asthma . Patient has been doing well overall noting NO SYMPTOMS and continues on medication regimen consisting of fluticasone without adverse reactions or side effects.     DEPRESSION - Patient has a long history of Depression of moderate severity requiring medication for control with recent symptoms being stable..Current symptoms of depression include none.       Review of Systems  Constitutional, neuro, ENT, endocrine, pulmonary, cardiac, gastrointestinal, genitourinary, musculoskeletal, integument and psychiatric systems are negative, except as otherwise noted.    Patient Active Problem List    Diagnosis Date Noted     Screening examination for pulmonary tuberculosis 11/01/2022     Priority: Medium     Acne vulgaris 09/13/2022     Priority: Medium     add sprinolactone. BMP today       Family history of thyroid disorder 08/16/2022     Priority: Medium     grand mom       Mild persistent asthma without complication 02/09/2021     Priority: Medium     flovent has helped  was not taking singular and resumed and it helped        Chronic allergic rhinitis due to animal hair and dander 10/19/2020     Priority: Medium     Adjustment disorder with mixed anxiety and depressed mood 07/07/2020     Priority: Medium     Numerous moles 06/25/2018     Priority: Medium     Immunity status testing 06/25/2018     Priority: Medium     Melanocytic nevi of trunk 12/19/2013     Priority: Medium      Past Medical History:   Diagnosis Date     Depressive disorder 8/15/2020     Encounter for insertion of mirena IUD      Uncomplicated asthma 3/20/2018     Past Surgical History:   Procedure Laterality Date     ENT SURGERY  " 2003    Ear tubes     MOLE REMOVAL  07/22/2021    Around vagina     Current Outpatient Medications   Medication Sig Dispense Refill     albuterol (PROAIR HFA/PROVENTIL HFA/VENTOLIN HFA) 108 (90 Base) MCG/ACT inhaler Inhale 2 puffs into the lungs every 6 hours as needed for shortness of breath / dyspnea or wheezing 6.7 g 0     FLUoxetine (PROZAC) 40 MG capsule Take 1 capsule (40 mg) by mouth daily 30 capsule 11     fluticasone (ARNUITY ELLIPTA) 100 MCG/ACT inhaler Inhale 1 puff into the lungs daily 1 each 11     levonorgestrel (MIRENA) 20 MCG/24HR IUD 1 each (20 mcg) by Intrauterine route once       montelukast (SINGULAIR) 10 MG tablet Take 1 tablet (10 mg) by mouth At Bedtime 90 tablet 1     spironolactone (ALDACTONE) 100 MG tablet TAKE 1 TABLET(100 MG) BY MOUTH DAILY 30 tablet 6       No Known Allergies     Social History     Tobacco Use     Smoking status: Never     Smokeless tobacco: Never   Vaping Use     Vaping status: Never Used   Substance Use Topics     Alcohol use: Yes     Family History   Problem Relation Age of Onset     Diabetes Paternal Grandfather      Hypertension Paternal Grandfather      Heart Disease Paternal Grandfather      Cerebrovascular Disease Paternal Grandmother      Breast Cancer Paternal Grandmother      Colon Cancer Maternal Grandmother      Thyroid Disease Maternal Grandmother      Depression Mother      Depression Sister      Anxiety Disorder Sister      Obesity Sister      No Known Problems Father      No Known Problems Brother      Heart Disease Maternal Grandfather      Gout Maternal Grandfather      No Known Problems Other      History   Drug Use Unknown         Objective     /80   Pulse 62   Temp 98.9  F (37.2  C) (Temporal)   Resp 18   Ht 1.803 m (5' 11\")   Wt 69.9 kg (154 lb)   SpO2 97%   BMI 21.48 kg/m      Physical Exam    GENERAL APPEARANCE: healthy, alert and no distress     EYES: EOMI, PERRL     HENT: ear canals and TM's normal and nose and mouth without " ulcers or lesions     NECK: no adenopathy and mass about 1cms soft palpabe     RESP: lungs clear to auscultation - no rales, rhonchi or wheezes     CV: regular rates and rhythm, normal S1 S2, no S3 or S4 and no murmur, click or rub     ABDOMEN:  soft, nontender, no HSM or masses and bowel sounds normal     MS: extremities normal- no gross deformities noted, no evidence of inflammation in joints, FROM in all extremities.     SKIN: no suspicious lesions or rashes     NEURO: Normal strength and tone, sensory exam grossly normal, mentation intact and speech normal     PSYCH: mentation appears normal. and affect normal/bright     LYMPHATICS: No cervical adenopathy    Recent Labs   Lab Test 08/08/22  1359      POTASSIUM 4.6   CR 0.89        Diagnostics:  No labs were ordered during this visit.   No EKG required for low risk surgery (cataract, skin procedure, breast biopsy, etc).  No EKG required, no history of coronary heart disease, significant arrhythmia, peripheral arterial disease or other structural heart disease.    Revised Cardiac Risk Index (RCRI):  The patient has the following serious cardiovascular risks for perioperative complications:   - No serious cardiac risks = 0 points     RCRI Interpretation: 0 points: Class I (very low risk - 0.4% complication rate)           Signed Electronically by: Adriana Jimenez MD  Copy of this evaluation report is provided to requesting physician.

## 2023-04-09 ENCOUNTER — ANESTHESIA EVENT (OUTPATIENT)
Dept: SURGERY | Facility: CLINIC | Age: 23
End: 2023-04-09
Payer: COMMERCIAL

## 2023-04-10 ENCOUNTER — APPOINTMENT (OUTPATIENT)
Dept: SURGERY | Facility: PHYSICIAN GROUP | Age: 23
End: 2023-04-10
Payer: COMMERCIAL

## 2023-04-10 ENCOUNTER — ANESTHESIA (OUTPATIENT)
Dept: SURGERY | Facility: CLINIC | Age: 23
End: 2023-04-10
Payer: COMMERCIAL

## 2023-04-10 ENCOUNTER — HOSPITAL ENCOUNTER (OUTPATIENT)
Facility: CLINIC | Age: 23
Discharge: HOME | End: 2023-04-10
Attending: STUDENT IN AN ORGANIZED HEALTH CARE EDUCATION/TRAINING PROGRAM | Admitting: STUDENT IN AN ORGANIZED HEALTH CARE EDUCATION/TRAINING PROGRAM
Payer: COMMERCIAL

## 2023-04-10 VITALS
DIASTOLIC BLOOD PRESSURE: 74 MMHG | OXYGEN SATURATION: 98 % | HEART RATE: 53 BPM | WEIGHT: 156.4 LBS | BODY MASS INDEX: 21.9 KG/M2 | SYSTOLIC BLOOD PRESSURE: 123 MMHG | TEMPERATURE: 97.4 F | HEIGHT: 71 IN | RESPIRATION RATE: 16 BRPM

## 2023-04-10 DIAGNOSIS — G89.18 ACUTE POST-OPERATIVE PAIN: Primary | ICD-10-CM

## 2023-04-10 LAB — HCG UR QL: NEGATIVE

## 2023-04-10 PROCEDURE — 999N000141 HC STATISTIC PRE-PROCEDURE NURSING ASSESSMENT: Performed by: STUDENT IN AN ORGANIZED HEALTH CARE EDUCATION/TRAINING PROGRAM

## 2023-04-10 PROCEDURE — 250N000013 HC RX MED GY IP 250 OP 250 PS 637: Performed by: STUDENT IN AN ORGANIZED HEALTH CARE EDUCATION/TRAINING PROGRAM

## 2023-04-10 PROCEDURE — 88305 TISSUE EXAM BY PATHOLOGIST: CPT | Mod: TC | Performed by: STUDENT IN AN ORGANIZED HEALTH CARE EDUCATION/TRAINING PROGRAM

## 2023-04-10 PROCEDURE — 360N000077 HC SURGERY LEVEL 4, PER MIN: Performed by: STUDENT IN AN ORGANIZED HEALTH CARE EDUCATION/TRAINING PROGRAM

## 2023-04-10 PROCEDURE — 250N000011 HC RX IP 250 OP 636: Performed by: ANESTHESIOLOGY

## 2023-04-10 PROCEDURE — 258N000003 HC RX IP 258 OP 636

## 2023-04-10 PROCEDURE — 250N000009 HC RX 250: Performed by: STUDENT IN AN ORGANIZED HEALTH CARE EDUCATION/TRAINING PROGRAM

## 2023-04-10 PROCEDURE — 710N000009 HC RECOVERY PHASE 1, LEVEL 1, PER MIN: Performed by: STUDENT IN AN ORGANIZED HEALTH CARE EDUCATION/TRAINING PROGRAM

## 2023-04-10 PROCEDURE — 710N000012 HC RECOVERY PHASE 2, PER MINUTE: Performed by: STUDENT IN AN ORGANIZED HEALTH CARE EDUCATION/TRAINING PROGRAM

## 2023-04-10 PROCEDURE — 272N000001 HC OR GENERAL SUPPLY STERILE: Performed by: STUDENT IN AN ORGANIZED HEALTH CARE EDUCATION/TRAINING PROGRAM

## 2023-04-10 PROCEDURE — 250N000011 HC RX IP 250 OP 636: Performed by: STUDENT IN AN ORGANIZED HEALTH CARE EDUCATION/TRAINING PROGRAM

## 2023-04-10 PROCEDURE — 88304 TISSUE EXAM BY PATHOLOGIST: CPT | Mod: 26 | Performed by: PATHOLOGY

## 2023-04-10 PROCEDURE — 370N000017 HC ANESTHESIA TECHNICAL FEE, PER MIN: Performed by: STUDENT IN AN ORGANIZED HEALTH CARE EDUCATION/TRAINING PROGRAM

## 2023-04-10 PROCEDURE — 81025 URINE PREGNANCY TEST: CPT | Performed by: ANESTHESIOLOGY

## 2023-04-10 PROCEDURE — 11421 EXC H-F-NK-SP B9+MARG 0.6-1: CPT | Performed by: STUDENT IN AN ORGANIZED HEALTH CARE EDUCATION/TRAINING PROGRAM

## 2023-04-10 RX ORDER — ACETAMINOPHEN 325 MG/1
975 TABLET ORAL ONCE
Status: COMPLETED | OUTPATIENT
Start: 2023-04-10 | End: 2023-04-10

## 2023-04-10 RX ORDER — OXYCODONE HYDROCHLORIDE 5 MG/1
5-10 TABLET ORAL EVERY 4 HOURS PRN
Qty: 12 TABLET | Refills: 0 | Status: SHIPPED | OUTPATIENT
Start: 2023-04-10 | End: 2024-01-11

## 2023-04-10 RX ORDER — SODIUM CHLORIDE, SODIUM LACTATE, POTASSIUM CHLORIDE, CALCIUM CHLORIDE 600; 310; 30; 20 MG/100ML; MG/100ML; MG/100ML; MG/100ML
INJECTION, SOLUTION INTRAVENOUS CONTINUOUS PRN
Status: DISCONTINUED | OUTPATIENT
Start: 2023-04-10 | End: 2023-04-10

## 2023-04-10 RX ORDER — FENTANYL CITRATE 0.05 MG/ML
25 INJECTION, SOLUTION INTRAMUSCULAR; INTRAVENOUS EVERY 5 MIN PRN
Status: DISCONTINUED | OUTPATIENT
Start: 2023-04-10 | End: 2023-04-10 | Stop reason: HOSPADM

## 2023-04-10 RX ORDER — ONDANSETRON 2 MG/ML
4 INJECTION INTRAMUSCULAR; INTRAVENOUS EVERY 30 MIN PRN
Status: DISCONTINUED | OUTPATIENT
Start: 2023-04-10 | End: 2023-04-10 | Stop reason: HOSPADM

## 2023-04-10 RX ORDER — HYDROMORPHONE HCL IN WATER/PF 6 MG/30 ML
0.4 PATIENT CONTROLLED ANALGESIA SYRINGE INTRAVENOUS EVERY 5 MIN PRN
Status: DISCONTINUED | OUTPATIENT
Start: 2023-04-10 | End: 2023-04-10 | Stop reason: HOSPADM

## 2023-04-10 RX ORDER — LIDOCAINE HYDROCHLORIDE AND EPINEPHRINE 10; 10 MG/ML; UG/ML
INJECTION, SOLUTION INFILTRATION; PERINEURAL PRN
Status: DISCONTINUED | OUTPATIENT
Start: 2023-04-10 | End: 2023-04-10 | Stop reason: HOSPADM

## 2023-04-10 RX ORDER — FENTANYL CITRATE 0.05 MG/ML
50 INJECTION, SOLUTION INTRAMUSCULAR; INTRAVENOUS EVERY 5 MIN PRN
Status: DISCONTINUED | OUTPATIENT
Start: 2023-04-10 | End: 2023-04-10 | Stop reason: HOSPADM

## 2023-04-10 RX ORDER — ONDANSETRON 4 MG/1
4 TABLET, ORALLY DISINTEGRATING ORAL EVERY 30 MIN PRN
Status: DISCONTINUED | OUTPATIENT
Start: 2023-04-10 | End: 2023-04-10 | Stop reason: HOSPADM

## 2023-04-10 RX ORDER — CEFAZOLIN SODIUM/WATER 2 G/20 ML
2 SYRINGE (ML) INTRAVENOUS
Status: COMPLETED | OUTPATIENT
Start: 2023-04-10 | End: 2023-04-10

## 2023-04-10 RX ORDER — MAGNESIUM HYDROXIDE 1200 MG/15ML
LIQUID ORAL PRN
Status: DISCONTINUED | OUTPATIENT
Start: 2023-04-10 | End: 2023-04-10 | Stop reason: HOSPADM

## 2023-04-10 RX ORDER — AMOXICILLIN 250 MG
1-2 CAPSULE ORAL 2 TIMES DAILY PRN
Qty: 20 TABLET | Refills: 0 | Status: SHIPPED | OUTPATIENT
Start: 2023-04-10 | End: 2024-01-11

## 2023-04-10 RX ORDER — OXYCODONE HYDROCHLORIDE 5 MG/1
5 TABLET ORAL
Status: DISCONTINUED | OUTPATIENT
Start: 2023-04-10 | End: 2023-04-10 | Stop reason: HOSPADM

## 2023-04-10 RX ORDER — PROPOFOL 10 MG/ML
INJECTION, EMULSION INTRAVENOUS CONTINUOUS PRN
Status: DISCONTINUED | OUTPATIENT
Start: 2023-04-10 | End: 2023-04-10

## 2023-04-10 RX ORDER — HYDROMORPHONE HCL IN WATER/PF 6 MG/30 ML
0.2 PATIENT CONTROLLED ANALGESIA SYRINGE INTRAVENOUS EVERY 5 MIN PRN
Status: DISCONTINUED | OUTPATIENT
Start: 2023-04-10 | End: 2023-04-10 | Stop reason: HOSPADM

## 2023-04-10 RX ORDER — FENTANYL CITRATE 50 UG/ML
INJECTION, SOLUTION INTRAMUSCULAR; INTRAVENOUS PRN
Status: DISCONTINUED | OUTPATIENT
Start: 2023-04-10 | End: 2023-04-10

## 2023-04-10 RX ORDER — SODIUM CHLORIDE, SODIUM LACTATE, POTASSIUM CHLORIDE, CALCIUM CHLORIDE 600; 310; 30; 20 MG/100ML; MG/100ML; MG/100ML; MG/100ML
INJECTION, SOLUTION INTRAVENOUS CONTINUOUS
Status: DISCONTINUED | OUTPATIENT
Start: 2023-04-10 | End: 2023-04-10 | Stop reason: HOSPADM

## 2023-04-10 RX ORDER — BUPIVACAINE HYDROCHLORIDE AND EPINEPHRINE 5; 5 MG/ML; UG/ML
INJECTION, SOLUTION EPIDURAL; INTRACAUDAL; PERINEURAL
Status: DISCONTINUED
Start: 2023-04-10 | End: 2023-04-10 | Stop reason: HOSPADM

## 2023-04-10 RX ORDER — CEFAZOLIN SODIUM/WATER 2 G/20 ML
2 SYRINGE (ML) INTRAVENOUS SEE ADMIN INSTRUCTIONS
Status: DISCONTINUED | OUTPATIENT
Start: 2023-04-10 | End: 2023-04-10 | Stop reason: HOSPADM

## 2023-04-10 RX ORDER — ONDANSETRON 2 MG/ML
INJECTION INTRAMUSCULAR; INTRAVENOUS PRN
Status: DISCONTINUED | OUTPATIENT
Start: 2023-04-10 | End: 2023-04-10

## 2023-04-10 RX ADMIN — PROPOFOL 300 MCG/KG/MIN: 10 INJECTION, EMULSION INTRAVENOUS at 11:10

## 2023-04-10 RX ADMIN — ACETAMINOPHEN 975 MG: 325 TABLET, FILM COATED ORAL at 10:08

## 2023-04-10 RX ADMIN — MIDAZOLAM 2 MG: 1 INJECTION INTRAMUSCULAR; INTRAVENOUS at 11:08

## 2023-04-10 RX ADMIN — ONDANSETRON 4 MG: 2 INJECTION INTRAMUSCULAR; INTRAVENOUS at 11:12

## 2023-04-10 RX ADMIN — Medication 2 G: at 11:11

## 2023-04-10 RX ADMIN — FENTANYL CITRATE 50 MCG: 50 INJECTION, SOLUTION INTRAMUSCULAR; INTRAVENOUS at 11:12

## 2023-04-10 RX ADMIN — SODIUM CHLORIDE, POTASSIUM CHLORIDE, SODIUM LACTATE AND CALCIUM CHLORIDE: 600; 310; 30; 20 INJECTION, SOLUTION INTRAVENOUS at 11:05

## 2023-04-10 ASSESSMENT — ENCOUNTER SYMPTOMS: SEIZURES: 0

## 2023-04-10 ASSESSMENT — ACTIVITIES OF DAILY LIVING (ADL)
ADLS_ACUITY_SCORE: 35
ADLS_ACUITY_SCORE: 35

## 2023-04-10 NOTE — ANESTHESIA CARE TRANSFER NOTE
Patient: Yady Sellers    Procedure: Procedure(s):  EXCISION NECK MASS       Diagnosis: Neck mass [R22.1]  Diagnosis Additional Information: No value filed.    Anesthesia Type:   MAC     Note:    Oropharynx: oropharynx clear of all foreign objects and spontaneously breathing  Level of Consciousness: awake  Oxygen Supplementation: room air    Independent Airway: airway patency satisfactory and stable  Dentition: dentition unchanged  Vital Signs Stable: post-procedure vital signs reviewed and stable  Report to RN Given: handoff report given  Patient transferred to: PACU    Handoff Report: Identifed the Patient, Identified the Reponsible Provider, Reviewed the pertinent medical history, Discussed the surgical course, Reviewed Intra-OP anesthesia mangement and issues during anesthesia, Set expectations for post-procedure period and Allowed opportunity for questions and acknowledgement of understanding      Vitals:  Vitals Value Taken Time   /75    Temp     Pulse 53    Resp 12    SpO2 100        Electronically Signed By: ONEYDA Nix CRNA  April 10, 2023  11:52 AM

## 2023-04-10 NOTE — ANESTHESIA POSTPROCEDURE EVALUATION
Patient: Yady Sellers    Procedure: Procedure(s):  EXCISION NECK MASS       Anesthesia Type:  MAC    Note:  Disposition: Inpatient   Postop Pain Control: Uneventful            Sign Out: Well controlled pain   PONV: No   Neuro/Psych: Uneventful            Sign Out: Acceptable/Baseline neuro status   Airway/Respiratory: Uneventful            Sign Out: Acceptable/Baseline resp. status   CV/Hemodynamics: Uneventful            Sign Out: Acceptable CV status; No obvious hypovolemia; No obvious fluid overload   Other NRE: NONE   DID A NON-ROUTINE EVENT OCCUR?            Last vitals:  Vitals Value Taken Time   /71 04/10/23 1230   Temp 36.4  C (97.5  F) 04/10/23 1230   Pulse 66 04/10/23 1234   Resp 17 04/10/23 1234   SpO2 100 % 04/10/23 1234   Vitals shown include unvalidated device data.    Electronically Signed By: Jennifer Pereira  April 10, 2023  12:36 PM

## 2023-04-10 NOTE — ANESTHESIA PREPROCEDURE EVALUATION
Anesthesia Pre-Procedure Evaluation    Patient: Yady Sellers   MRN: [id number] : 2000        Procedure : Procedure(s):  EXCISION NECK MASS          Past Medical History:   Diagnosis Date     Acne vulgaris      Adjustment disorder with mixed anxiety and depressed mood      Allergic rhinitis      Depressive disorder 8/15/2020     Encounter for insertion of mirena IUD      Uncomplicated asthma 3/20/2018      Past Surgical History:   Procedure Laterality Date     ENT SURGERY  2003    Ear tubes     MOLE REMOVAL  2021    Around vagina      No Known Allergies   Social History     Tobacco Use     Smoking status: Never     Smokeless tobacco: Never   Vaping Use     Vaping status: Never Used   Substance Use Topics     Alcohol use: Yes     Comment: 1 per weekend per month      Wt Readings from Last 1 Encounters:   04/10/23 70.9 kg (156 lb 6.4 oz)        Anesthesia Evaluation   Pt has had prior anesthetic.     No history of anesthetic complications       ROS/MED HX  ENT/Pulmonary:     (+) allergic rhinitis, asthma  (-) sleep apnea   Neurologic:    (-) no seizures and no CVA   Cardiovascular:       METS/Exercise Tolerance:     Hematologic:       Musculoskeletal:       GI/Hepatic:    (-) GERD   Renal/Genitourinary:       Endo:       Psychiatric/Substance Use:       Infectious Disease:       Malignancy:       Other:            Physical Exam    Airway        Mallampati: II   TM distance: > 3 FB   Neck ROM: full   Mouth opening: > 3 cm    Respiratory Devices and Support         Dental       (+) Completely normal teeth      Cardiovascular   cardiovascular exam normal          Pulmonary   pulmonary exam normal                OUTSIDE LABS:  CBC:   Lab Results   Component Value Date    WBC 9.3 2018    HGB 15.3 2018    HCT 44.9 2018     2018     BMP:   Lab Results   Component Value Date     2022     2018    POTASSIUM 4.6 2022    POTASSIUM 3.9 2018     CHLORIDE 106 08/08/2022    CHLORIDE 105 04/06/2018    CO2 26 08/08/2022    CO2 29 04/06/2018    BUN 9 08/08/2022    BUN 12 04/06/2018    CR 0.89 08/08/2022    CR 0.75 04/06/2018    GLC 75 08/08/2022    GLC 78 04/06/2018     COAGS: No results found for: PTT, INR, FIBR  POC:   Lab Results   Component Value Date    HCG Negative 04/10/2023     HEPATIC: No results found for: ALBUMIN, PROTTOTAL, ALT, AST, GGT, ALKPHOS, BILITOTAL, BILIDIRECT, MARGARITA  OTHER:   Lab Results   Component Value Date    DEV 9.4 08/08/2022    TSH 1.14 08/08/2022       Anesthesia Plan    ASA Status:  2      Anesthesia Type: MAC.   Induction: Intravenous.           Consents    Anesthesia Plan(s) and associated risks, benefits, and realistic alternatives discussed. Questions answered and patient/representative(s) expressed understanding.    - Discussed:     - Discussed with:  Patient         Postoperative Care    Pain management: IV analgesics, Oral pain medications.   PONV prophylaxis: Ondansetron (or other 5HT-3), Dexamethasone or Solumedrol     Comments:                Jennifer Pereira

## 2023-04-10 NOTE — OP NOTE
General Surgery Operative Note    PREOPERATIVE DIAGNOSIS:  Neck Mass     POSTOPERATIVE DIAGNOSIS:  Epidermoid cyst    PROCEDURE: Excision of neck mass    ANESTHESIA:  Local with MAC     SURGEON:  Greg Winchester MD     ASSISTANT:  Lincoln Gannon PA-C assisted in the above procedure. They provided assistance with pre-operative positioning, prepping, and draping of the patient. The assistant provided vital operative assistance with retraction using instruments thus providing the necessary exposure and visualization for the case, manipulation of tissues to achieve hemostasis, suction for visualization and assisted in closure of the wound. Post-operatively they assisted in transfer of the patient off the operative table and transition to the PACU physicians.    INDICATIONS:  Yady Sellers is a 22 year old female who presented to the Surgery Clinic with complaints of neck mass.  Please see the most recent clinic note for further details of the clinical decision making process.  Risk and benefits were discussed with the patient and patient elected proceed with the stated procedure.    PROCEDURE: Patient was brought to the operating room and placed onto the operating room table in supine position.  She was induced under monitored anesthesia care.  Patient was appropriately padded and secured to the table.  Preoperative antibiotics were given.  A timeout was completed to confirm patient, procedure, and any special equipment needed for the procedure.    A small 1 cm transverse incision was made along the neck fold over the palpable mass.  Dissection was carefully brought down to the thin subcutaneous layer.  Immediately the mass was encountered and appeared pearly white and easily mobile.  It was circumferentially dissected bluntly and came out of the cavity easily, measuring 1 cm x 1 cm.  It was passed off the field for pathology. The rest of the cavity was examined and there is no evidence of any remaining wall or other cyst.   Incision was then closed in layers with 3-0 Vicryl suture and 4-0 Monocryl suture.  Dressings were applied including surgical glue and Steri-Strip.    Patient tolerated the procedure well without any complications.  Patient was brought to postanesthesia care unit in stable condition.    ESTIMATED BLOOD LOSS: Minimal 1 cc    INTRAOPERATIVE FINDINGS: 1 cm x 1 cm pearly white neck mass consistent with epidermoid cyst.    SPECIMENS: neck mass    DRAINS: none    CONDITION: The patient will be discharged to home directly from the Postanesthesia Care Unit with instructions for postoperative cares and medications for pain management. They will follow up in the Surgery Clinic in two to three weeks' time for postoperative monitoring.     Greg Winchester MD

## 2023-04-10 NOTE — DISCHARGE INSTRUCTIONS
Today you were given 975 mg of Tylenol at 10:10am. The recommended daily maximum dose is 4000 mg.      [facility]  Discharge Instructions: Post-Operative Excision of Mass or Lesion    ACTIVITY  Take frequent, short walks and increase your activity gradually.    Avoid strenuous physical activity or heavy lifting greater than 15-20 lbs. for 1-2 weeks.  You may climb stairs.  You may drive without restrictions when you are not using any prescription pain medication and feel comfortable in a car.  You may return to work/school when you are comfortable without any prescription pain medication.    WOUND CARE  You may remove your outer dressing or Band-Aids and shower 48 hours after the surgery.  Pat your incisions dry and leave them open to air.  Re-apply dressing (Band-Aids or gauze/tape) as needed for comfort or drainage.  You may have steri-strips (looks like white tape) or skin glue on your incisions.  You may peel off the steri-strips 2 weeks after your surgery if they have not peeled off on their own.  If you have skin glue, it will peel up and fall off on its own.  Do not soak your incisions in a tub or pool for 2 weeks.   Do not apply any lotions, creams, or ointments to your incision(s).  A ridge under your incision(s) is normal and will gradually resolve.    DIET  Start with liquids, then gradually resume your regular diet as tolerated.   Drink plenty of liquids to stay hydrated.    PAIN  Expect some tenderness and discomfort at the incision site(s).  Use the prescribed pain medication at your discretion.  Expect gradual resolution of your pain over several days.  You may take ibuprofen with food (unless you have been told not to) or acetaminophen/Tylenol instead of or in addition to your prescribed pain medication.  If you are taking Norco or Percocet, do not take any additional acetaminophen/Tylenol.  Do not drink alcohol or drive while you are taking pain medications.  You may  apply ice to your incisions in 20 minute intervals as needed for the next 48 hours.  After that time, consider switching to heat if you prefer.    EXPECTATIONS  Pain medications can cause constipation.  Limit use when possible.  Take over the counter stool softener/stimulant, such as Colace or Senna, 1-2 times a day with plenty of water.  You may take a mild over the counter laxative, such as Miralax or a suppository, as needed.    You may discontinue these medications once you are having regular bowel movements and/or are no longer taking your narcotic pain medication.    FOLLOW UP  Follow up with Dr. Winchester at [facility] in 1-2 weeks.  Call [phone] to schedule an appointment.   If you have any questions or concerns, please call us at [phone] and ask to speak with our nurse.    We are located at [address] [place] [address].    CALL OUR OFFICE [phone] IF YOU HAVE:   Chills or fever above 101 F.  Increased redness, warmth, or drainage at your incisions.  Significant bleeding.  Pain not relieved by your pain medication or rest.  Increasing pain after the first 48 hours.  Any other concerns or questions.         Same Day Surgery Discharge Instructions for  Sedation and General Anesthesia     It's not unusual to feel dizzy, light-headed or faint for up to 24 hours after surgery or while taking pain medication.  If you have these symptoms: sit for a few minutes before standing and have someone assist you when you get up to walk or use the bathroom.    You should rest and relax for the next 24 hours. We recommend you make arrangements to have an adult stay with you for at least 24 hours after your discharge.  Avoid hazardous and strenuous activity.    DO NOT DRIVE any vehicle or operate mechanical equipment for 24 hours following the end of your surgery.  Even though you may feel normal, your reactions may be affected by the medication you have received.    Do not drink  alcoholic beverages for 24 hours following surgery.     Slowly progress to your regular diet as you feel able. It's not unusual to feel nauseated and/or vomit after receiving anesthesia.  If you develop these symptoms, drink clear liquids (apple juice, ginger ale, broth, 7-up, etc. ) until you feel better.  If your nausea and vomiting persists for 24 hours, please notify your surgeon.      All narcotic pain medications, along with inactivity and anesthesia, can cause constipation. Drinking plenty of liquids and increasing fiber intake will help.    For any questions of a medical nature, call your surgeon.    Do not make important decisions for 24 hours.    If you had general anesthesia, you may have a sore throat for a couple of days related to the breathing tube used during surgery.  You may use Cepacol lozenges to help with this discomfort.  If it worsens or if you develop a fever, contact your surgeon.     If you feel your pain is not well managed with the pain medications prescribed by your surgeon, please contact your surgeon's office to let them know so they can address your concerns.      **If you have questions or concerns about your procedure,   call  **

## 2023-04-12 LAB
PATH REPORT.COMMENTS IMP SPEC: NORMAL
PATH REPORT.COMMENTS IMP SPEC: NORMAL
PATH REPORT.FINAL DX SPEC: NORMAL
PATH REPORT.GROSS SPEC: NORMAL
PATH REPORT.MICROSCOPIC SPEC OTHER STN: NORMAL
PATH REPORT.RELEVANT HX SPEC: NORMAL
PHOTO IMAGE: NORMAL

## 2023-06-12 NOTE — PATIENT INSTRUCTIONS
Patient attended Phase 2 Cardiac Rehab Exercise Session. Further documentation will be scanned into the medical record upon discharge.   Self care and processing when experiencing anxiety about the election and upcoming changes    _______________________________________________________    Treatment Plan    Patient's Name: Yady Sellers  YOB: 2000    Date: 11/3/2020    DSM5 Diagnoses: 296.22 (F32.1)  Major Depressive Disorder, Single Episode, Moderate _ and With mixed features or 300.02 (F41.1) Generalized Anxiety Disorder  Psychosocial / Contextual Factors: COVID 19 restrictions                                                                Planning on line classes from home for Fall due to COVID 19                                                                Limited social contacts                                                                Sister with history of significant depression and anxiety                                                                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    Anticipated number of session or this episode of care: will review in 90 days      MeasurableTreatment Goal(s) related to diagnosis / functional impairment(s)  Goal 1: Patient will decrease anxiety symptoms    I will know I've met my goal when I can manage my symptoms.      Objective #A (Patient Action)    Patient will continue to identify 2 fears / thoughts that contribute to feeling anxious.  Status: Continued - Date(s):11/3/2020     Intervention(s)  Therapist will continue to  teach emotional recognition/identification. skills.    Objective #B  Patient will continue to use at least 2 coping skills for anxiety management in the next few weeks.  Status: Continued - Date(s):11/3/2020     Intervention(s)  Therapist will continue to  teach emotional regulation skills. for anxiety.    Objective #C  Patient will continue to identify three distraction and diversion activities and use those  activities to decrease level of anxiety  .  Status: Continued - Date(s):11/3/2020     Intervention(s)  Therapist will continue to teach distraction skills. and diversion skills.      Goal 2: Patient will build skills to manage anxiety reactions that affect her academics    I will know I've met my goal when I have skills to manage my anxiety and can complete my academic work.      Objective #A (Patient Action)    Status: New - Date: 11/3/2020     Patient will identify 2 fears / thoughts that contribute to feeling anxious.    Intervention(s)  Therapist will teach emotional recognition/identification. skills.    Objective #B  Patient will use cognitive strategies identified in therapy to challenge anxious thoughts.    Status: New - Date: 11/3/2020     Intervention(s)  Therapist will teach Cognitive Behavioral Skills.    Objective #C  Patient will use regulation skills to balance emotions.  Status: New - Date: 11/3/2020     Intervention(s)  Therapist will teach emotional regulation skills. for anxiety.      Patient will be mailed treatment plan for review and signature .      Elle Gonzalez, LOREN LMFT  November 3, 2020

## 2023-07-10 ENCOUNTER — PATIENT OUTREACH (OUTPATIENT)
Dept: CARE COORDINATION | Facility: CLINIC | Age: 23
End: 2023-07-10
Payer: COMMERCIAL

## 2023-07-18 DIAGNOSIS — J45.30 MILD PERSISTENT ASTHMA WITHOUT COMPLICATION: ICD-10-CM

## 2023-07-18 RX ORDER — MONTELUKAST SODIUM 10 MG/1
1 TABLET ORAL AT BEDTIME
Qty: 90 TABLET | Refills: 0 | Status: SHIPPED | OUTPATIENT
Start: 2023-07-18 | End: 2023-10-11

## 2023-07-24 ENCOUNTER — PATIENT OUTREACH (OUTPATIENT)
Dept: CARE COORDINATION | Facility: CLINIC | Age: 23
End: 2023-07-24
Payer: COMMERCIAL

## 2023-08-04 DIAGNOSIS — L70.0 ACNE VULGARIS: ICD-10-CM

## 2023-08-07 RX ORDER — SPIRONOLACTONE 100 MG/1
100 TABLET, FILM COATED ORAL DAILY
Qty: 30 TABLET | Refills: 0 | Status: SHIPPED | OUTPATIENT
Start: 2023-08-07 | End: 2023-09-05

## 2023-08-07 NOTE — TELEPHONE ENCOUNTER
Medication is being filled for 1 time refill only due to:  Patient needs to be seen because due for physical.  Last 8/8/22.    Samantha García RN

## 2023-08-18 DIAGNOSIS — J45.30 MILD PERSISTENT ASTHMA WITHOUT COMPLICATION: ICD-10-CM

## 2023-08-18 RX ORDER — FLUTICASONE FUROATE 100 UG/1
1 POWDER RESPIRATORY (INHALATION) DAILY
Qty: 1 EACH | Refills: 3 | Status: SHIPPED | OUTPATIENT
Start: 2023-08-18 | End: 2023-11-28

## 2023-08-18 NOTE — TELEPHONE ENCOUNTER
"Requested Prescriptions   Pending Prescriptions Disp Refills    ARNUITY ELLIPTA 100 MCG/ACT inhaler [Pharmacy Med Name: ARNUITY ELLIPTA 100MCG ORAL INH 30]       Sig: INHALE 1 PUFF INTO THE LUNGS DAILY       Inhaled Steroids Protocol Passed - 8/18/2023  9:38 AM        Passed - Patient is age 12 or older        Passed - Asthma control assessment score within normal limits in last 6 months     Please review ACT score.           Passed - Medication is active on med list        Passed - Recent (6 mo) or future (30 days) visit within the authorizing provider's specialty     Patient had office visit in the last 6 months or has a visit in the next 30 days with authorizing provider or within the authorizing provider's specialty.  See \"Patient Info\" tab in inbasket, or \"Choose Columns\" in Meds & Orders section of the refill encounter.               Prescription approved per [facility] Refill Protocol.  Ernrique Álvarez RN  [facility]     "

## 2023-09-05 DIAGNOSIS — L70.0 ACNE VULGARIS: ICD-10-CM

## 2023-09-10 ENCOUNTER — HEALTH MAINTENANCE LETTER (OUTPATIENT)
Age: 23
End: 2023-09-10

## 2023-09-28 DIAGNOSIS — F43.23 ADJUSTMENT DISORDER WITH MIXED ANXIETY AND DEPRESSED MOOD: ICD-10-CM

## 2023-09-29 RX ORDER — FLUOXETINE 40 MG/1
40 CAPSULE ORAL DAILY
Qty: 30 CAPSULE | Refills: 11 | Status: SHIPPED | OUTPATIENT
Start: 2023-09-29 | End: 2024-01-11

## 2023-09-29 NOTE — TELEPHONE ENCOUNTER
"Requested Prescriptions   Pending Prescriptions Disp Refills    FLUoxetine (PROZAC) 40 MG capsule [Pharmacy Med Name: FLUOXETINE 40MG CAPSULES] 30 capsule 11     Sig: TAKE 1 CAPSULE(40 MG) BY MOUTH DAILY       SSRIs Protocol Failed - 9/28/2023  9:40 AM        Failed - PHQ-9 score less than 5 in past 6 months     Please review last PHQ-9 score.           Passed - Medication is active on med list        Passed - Patient is age 18 or older        Passed - No active pregnancy on record        Passed - No positive pregnancy test in last 12 months        Passed - Recent (6 mo) or future (30 days) visit within the authorizing provider's specialty     Patient had office visit in the last 6 months or has a visit in the next 30 days with authorizing provider or within the authorizing provider's specialty.  See \"Patient Info\" tab in inbasket, or \"Choose Columns\" in Meds & Orders section of the refill encounter.               Routing refill request to provider for review/approval because:  Drug not on the [facility] refill protocol   PHQ9 score of 7 on 4/5/23, Anvato message sent   ThanksEnrrique RN  [facility]         "

## 2023-10-11 DIAGNOSIS — J45.30 MILD PERSISTENT ASTHMA WITHOUT COMPLICATION: ICD-10-CM

## 2023-10-12 RX ORDER — MONTELUKAST SODIUM 10 MG/1
1 TABLET ORAL AT BEDTIME
Qty: 90 TABLET | Refills: 0 | Status: SHIPPED | OUTPATIENT
Start: 2023-10-12 | End: 2024-01-08

## 2023-10-12 NOTE — TELEPHONE ENCOUNTER
One time refill only. Pt due for an appointment and to call to schedule.   Kaitlyn EVANS RN  [facility]

## 2023-10-26 RX ORDER — SPIRONOLACTONE 100 MG/1
100 TABLET, FILM COATED ORAL DAILY
Qty: 30 TABLET | Refills: 0 | Status: SHIPPED | OUTPATIENT
Start: 2023-10-26 | End: 2023-11-28

## 2024-01-02 ENCOUNTER — TELEPHONE (OUTPATIENT)
Dept: FAMILY MEDICINE | Facility: CLINIC | Age: 24
End: 2024-01-02
Payer: COMMERCIAL

## 2024-01-02 DIAGNOSIS — J45.30 MILD PERSISTENT ASTHMA WITHOUT COMPLICATION: ICD-10-CM

## 2024-01-02 DIAGNOSIS — L70.0 ACNE VULGARIS: ICD-10-CM

## 2024-01-03 RX ORDER — SPIRONOLACTONE 100 MG/1
100 TABLET, FILM COATED ORAL DAILY
Qty: 30 TABLET | Refills: 0 | Status: SHIPPED | OUTPATIENT
Start: 2024-01-03 | End: 2024-01-11

## 2024-01-03 RX ORDER — FLUTICASONE FUROATE 100 UG/1
1 POWDER RESPIRATORY (INHALATION) DAILY
Qty: 1 EACH | Refills: 0 | Status: SHIPPED | OUTPATIENT
Start: 2024-01-03 | End: 2024-01-11

## 2024-01-08 DIAGNOSIS — J45.30 MILD PERSISTENT ASTHMA WITHOUT COMPLICATION: ICD-10-CM

## 2024-01-08 RX ORDER — MONTELUKAST SODIUM 10 MG/1
1 TABLET ORAL AT BEDTIME
Qty: 90 TABLET | Refills: 0 | Status: SHIPPED | OUTPATIENT
Start: 2024-01-08 | End: 2024-01-11

## 2024-01-11 ENCOUNTER — OFFICE VISIT (OUTPATIENT)
Dept: FAMILY MEDICINE | Facility: CLINIC | Age: 24
End: 2024-01-11
Payer: COMMERCIAL

## 2024-01-11 VITALS
DIASTOLIC BLOOD PRESSURE: 64 MMHG | HEIGHT: 72 IN | OXYGEN SATURATION: 98 % | HEART RATE: 61 BPM | BODY MASS INDEX: 21.54 KG/M2 | WEIGHT: 159 LBS | SYSTOLIC BLOOD PRESSURE: 118 MMHG | TEMPERATURE: 97.5 F | RESPIRATION RATE: 19 BRPM

## 2024-01-11 DIAGNOSIS — Z12.4 SCREENING FOR CERVICAL CANCER: ICD-10-CM

## 2024-01-11 DIAGNOSIS — L70.0 ACNE VULGARIS: ICD-10-CM

## 2024-01-11 DIAGNOSIS — Z11.3 SCREENING FOR STDS (SEXUALLY TRANSMITTED DISEASES): ICD-10-CM

## 2024-01-11 DIAGNOSIS — Z13.21 ENCOUNTER FOR VITAMIN DEFICIENCY SCREENING: ICD-10-CM

## 2024-01-11 DIAGNOSIS — J45.30 MILD PERSISTENT ASTHMA WITHOUT COMPLICATION: ICD-10-CM

## 2024-01-11 DIAGNOSIS — Z00.00 ROUTINE GENERAL MEDICAL EXAMINATION AT A HEALTH CARE FACILITY: Primary | ICD-10-CM

## 2024-01-11 DIAGNOSIS — Z97.5 IUD (INTRAUTERINE DEVICE) IN PLACE: ICD-10-CM

## 2024-01-11 DIAGNOSIS — Z13.1 SCREENING FOR DIABETES MELLITUS: ICD-10-CM

## 2024-01-11 DIAGNOSIS — F43.23 ADJUSTMENT DISORDER WITH MIXED ANXIETY AND DEPRESSED MOOD: ICD-10-CM

## 2024-01-11 DIAGNOSIS — Z13.220 SCREENING FOR LIPID DISORDERS: ICD-10-CM

## 2024-01-11 LAB
ANION GAP SERPL CALCULATED.3IONS-SCNC: 8 MMOL/L (ref 7–15)
BUN SERPL-MCNC: 12.3 MG/DL (ref 6–20)
C TRACH DNA SPEC QL PROBE+SIG AMP: NEGATIVE
CALCIUM SERPL-MCNC: 9 MG/DL (ref 8.6–10)
CHLORIDE SERPL-SCNC: 104 MMOL/L (ref 98–107)
CHOLEST SERPL-MCNC: 104 MG/DL
CREAT SERPL-MCNC: 0.78 MG/DL (ref 0.51–0.95)
DEPRECATED HCO3 PLAS-SCNC: 26 MMOL/L (ref 22–29)
EGFRCR SERPLBLD CKD-EPI 2021: >90 ML/MIN/1.73M2
FASTING STATUS PATIENT QL REPORTED: YES
GLUCOSE SERPL-MCNC: 84 MG/DL (ref 70–99)
HDLC SERPL-MCNC: 48 MG/DL
LDLC SERPL CALC-MCNC: 47 MG/DL
N GONORRHOEA DNA SPEC QL NAA+PROBE: NEGATIVE
NONHDLC SERPL-MCNC: 56 MG/DL
POTASSIUM SERPL-SCNC: 4.4 MMOL/L (ref 3.4–5.3)
SODIUM SERPL-SCNC: 138 MMOL/L (ref 135–145)
TRIGL SERPL-MCNC: 44 MG/DL
VIT D+METAB SERPL-MCNC: 20 NG/ML (ref 20–50)

## 2024-01-11 PROCEDURE — 80048 BASIC METABOLIC PNL TOTAL CA: CPT | Performed by: FAMILY MEDICINE

## 2024-01-11 PROCEDURE — 99395 PREV VISIT EST AGE 18-39: CPT | Mod: 25 | Performed by: FAMILY MEDICINE

## 2024-01-11 PROCEDURE — 90677 PCV20 VACCINE IM: CPT | Performed by: FAMILY MEDICINE

## 2024-01-11 PROCEDURE — 90471 IMMUNIZATION ADMIN: CPT | Performed by: FAMILY MEDICINE

## 2024-01-11 PROCEDURE — 99214 OFFICE O/P EST MOD 30 MIN: CPT | Mod: 25 | Performed by: FAMILY MEDICINE

## 2024-01-11 PROCEDURE — 91320 SARSCV2 VAC 30MCG TRS-SUC IM: CPT | Performed by: FAMILY MEDICINE

## 2024-01-11 PROCEDURE — G0145 SCR C/V CYTO,THINLAYER,RESCR: HCPCS | Performed by: FAMILY MEDICINE

## 2024-01-11 PROCEDURE — 90480 ADMN SARSCOV2 VAC 1/ONLY CMP: CPT | Performed by: FAMILY MEDICINE

## 2024-01-11 PROCEDURE — 82306 VITAMIN D 25 HYDROXY: CPT | Performed by: FAMILY MEDICINE

## 2024-01-11 PROCEDURE — 87591 N.GONORRHOEAE DNA AMP PROB: CPT | Performed by: FAMILY MEDICINE

## 2024-01-11 PROCEDURE — 80061 LIPID PANEL: CPT | Performed by: FAMILY MEDICINE

## 2024-01-11 PROCEDURE — 36415 COLL VENOUS BLD VENIPUNCTURE: CPT | Performed by: FAMILY MEDICINE

## 2024-01-11 PROCEDURE — 87491 CHLMYD TRACH DNA AMP PROBE: CPT | Performed by: FAMILY MEDICINE

## 2024-01-11 RX ORDER — FLUTICASONE FUROATE 100 UG/1
1 POWDER RESPIRATORY (INHALATION) DAILY
Qty: 1 EACH | Refills: 0 | Status: SHIPPED | OUTPATIENT
Start: 2024-01-11 | End: 2024-01-29

## 2024-01-11 RX ORDER — ALBUTEROL SULFATE 90 UG/1
2 AEROSOL, METERED RESPIRATORY (INHALATION) EVERY 6 HOURS PRN
Qty: 6.7 G | Refills: 11 | Status: SHIPPED | OUTPATIENT
Start: 2024-01-11

## 2024-01-11 RX ORDER — SPIRONOLACTONE 100 MG/1
TABLET, FILM COATED ORAL
Qty: 90 TABLET | Refills: 3 | OUTPATIENT
Start: 2024-01-11

## 2024-01-11 RX ORDER — SPIRONOLACTONE 100 MG/1
100 TABLET, FILM COATED ORAL DAILY
Qty: 90 TABLET | Refills: 3 | Status: SHIPPED | OUTPATIENT
Start: 2024-01-11

## 2024-01-11 ASSESSMENT — ASTHMA QUESTIONNAIRES
QUESTION_4 LAST FOUR WEEKS HOW OFTEN HAVE YOU USED YOUR RESCUE INHALER OR NEBULIZER MEDICATION (SUCH AS ALBUTEROL): NOT AT ALL
QUESTION_1 LAST FOUR WEEKS HOW MUCH OF THE TIME DID YOUR ASTHMA KEEP YOU FROM GETTING AS MUCH DONE AT WORK, SCHOOL OR AT HOME: NONE OF THE TIME
QUESTION_5 LAST FOUR WEEKS HOW WOULD YOU RATE YOUR ASTHMA CONTROL: WELL CONTROLLED
ACT_TOTALSCORE: 24
QUESTION_2 LAST FOUR WEEKS HOW OFTEN HAVE YOU HAD SHORTNESS OF BREATH: NOT AT ALL
QUESTION_3 LAST FOUR WEEKS HOW OFTEN DID YOUR ASTHMA SYMPTOMS (WHEEZING, COUGHING, SHORTNESS OF BREATH, CHEST TIGHTNESS OR PAIN) WAKE YOU UP AT NIGHT OR EARLIER THAN USUAL IN THE MORNING: NOT AT ALL
ACT_TOTALSCORE: 24

## 2024-01-11 ASSESSMENT — ANXIETY QUESTIONNAIRES
3. WORRYING TOO MUCH ABOUT DIFFERENT THINGS: SEVERAL DAYS
7. FEELING AFRAID AS IF SOMETHING AWFUL MIGHT HAPPEN: SEVERAL DAYS
IF YOU CHECKED OFF ANY PROBLEMS ON THIS QUESTIONNAIRE, HOW DIFFICULT HAVE THESE PROBLEMS MADE IT FOR YOU TO DO YOUR WORK, TAKE CARE OF THINGS AT HOME, OR GET ALONG WITH OTHER PEOPLE: SOMEWHAT DIFFICULT
6. BECOMING EASILY ANNOYED OR IRRITABLE: NOT AT ALL
GAD7 TOTAL SCORE: 5
5. BEING SO RESTLESS THAT IT IS HARD TO SIT STILL: SEVERAL DAYS
GAD7 TOTAL SCORE: 5
2. NOT BEING ABLE TO STOP OR CONTROL WORRYING: NOT AT ALL
1. FEELING NERVOUS, ANXIOUS, OR ON EDGE: SEVERAL DAYS

## 2024-01-11 ASSESSMENT — ENCOUNTER SYMPTOMS
HEMATURIA: 0
FEVER: 0
NAUSEA: 0
HEMATOCHEZIA: 0
BREAST MASS: 0
PALPITATIONS: 0
MYALGIAS: 0
CONSTIPATION: 0
HEARTBURN: 0
WEAKNESS: 0
JOINT SWELLING: 0
DIARRHEA: 0
HEADACHES: 0
SORE THROAT: 0
NERVOUS/ANXIOUS: 0
ARTHRALGIAS: 0
EYE PAIN: 0
DIZZINESS: 0
ABDOMINAL PAIN: 0
FREQUENCY: 0
PARESTHESIAS: 0
CHILLS: 0
SHORTNESS OF BREATH: 0
DYSURIA: 0
COUGH: 0

## 2024-01-11 ASSESSMENT — PATIENT HEALTH QUESTIONNAIRE - PHQ9
5. POOR APPETITE OR OVEREATING: SEVERAL DAYS
SUM OF ALL RESPONSES TO PHQ QUESTIONS 1-9: 2

## 2024-01-11 ASSESSMENT — PAIN SCALES - GENERAL: PAINLEVEL: NO PAIN (0)

## 2024-01-11 NOTE — PROGRESS NOTES
SUBJECTIVE:   Yady is a 23 year old, presenting for the following:  Physical        1/11/2024     8:07 AM   Additional Questions   Roomed by ramirez pyle   Accompanied by kaye         1/11/2024     8:07 AM   Patient Reported Additional Medications   Patient reports taking the following new medications n/a   Selective serotonin reuptake inhibitor cause low libido and anorgasmia  Prozac has had same side effects and its helped with mental  health the most  Wondering about lower dose      Asthma is controlled   unsure if montelucast helped, she  stopped it   Refill needed and and over all asthma well controlled on fluticasone         Healthy Habits:     Getting at least 3 servings of Calcium per day:  Yes    Bi-annual eye exam:  NO    Dental care twice a year:  Yes    Sleep apnea or symptoms of sleep apnea:  None    Diet:  Vegetarian/vegan    Frequency of exercise:  2-3 days/week    Duration of exercise:  30-45 minutes    Taking medications regularly:  Yes    Medication side effects:  Other    Additional concerns today:  No      Today's PHQ-2 Score:       1/11/2024     7:48 AM   PHQ-2 ( 2000 Pfizer)   Q1: Little interest or pleasure in doing things 1   Q2: Feeling down, depressed or hopeless 0   PHQ-2 Score 1   Q1: Little interest or pleasure in doing things Several days   Q2: Feeling down, depressed or hopeless Not at all   PHQ-2 Score 1           Social History     Tobacco Use    Smoking status: Never    Smokeless tobacco: Never   Substance Use Topics    Alcohol use: Yes     Comment: 1 per weekend per month             1/11/2024     7:48 AM   Alcohol Use   Prescreen: >3 drinks/day or >7 drinks/week? No     Reviewed orders with patient.  Reviewed health maintenance and updated orders accordingly - Yes  BP Readings from Last 3 Encounters:   01/11/24 118/64   04/10/23 123/74   04/05/23 118/80    Wt Readings from Last 3 Encounters:   01/11/24 72.1 kg (159 lb)   04/10/23 70.9 kg (156 lb 6.4 oz)   04/05/23 69.9 kg (154 lb)  "                   Breast Cancer Screening:        History of abnormal Pap smear: NO - age 21-29 PAP every 3 years recommended      7/8/2021    12:35 PM   PAP / HPV   PAP (Historical) NIL      Reviewed and updated as needed this visit by clinical staff   Tobacco  Allergies  Meds  Problems  Med Hx  Surg Hx  Fam Hx          Reviewed and updated as needed this visit by Provider   Tobacco  Allergies  Meds  Problems  Med Hx  Surg Hx  Fam Hx             Review of Systems   Constitutional:  Negative for chills and fever.   HENT:  Negative for congestion, ear pain, hearing loss and sore throat.    Eyes:  Negative for pain and visual disturbance.   Respiratory:  Negative for cough and shortness of breath.    Cardiovascular:  Negative for chest pain, palpitations and peripheral edema.   Gastrointestinal:  Negative for abdominal pain, constipation, diarrhea, heartburn, hematochezia and nausea.   Breasts:  Negative for tenderness, breast mass and discharge.   Genitourinary:  Negative for dysuria, frequency, genital sores, hematuria, pelvic pain, urgency, vaginal bleeding and vaginal discharge.   Musculoskeletal:  Negative for arthralgias, joint swelling and myalgias.   Skin:  Negative for rash.   Neurological:  Negative for dizziness, weakness, headaches and paresthesias.   Psychiatric/Behavioral:  Negative for mood changes. The patient is not nervous/anxious.           OBJECTIVE:   /64 (BP Location: Left arm, Patient Position: Sitting, Cuff Size: Adult Regular)   Pulse 61   Temp 97.5  F (36.4  C) (Temporal)   Resp 19   Ht 1.82 m (5' 11.65\")   Wt 72.1 kg (159 lb)   SpO2 98%   BMI 21.77 kg/m    Physical Exam  GENERAL: healthy, alert and no distress  EYES: Eyes grossly normal to inspection, PERRL and conjunctivae and sclerae normal  HENT: ear canals and TM's normal, nose and mouth without ulcers or lesions  NECK: no adenopathy, no asymmetry, masses, or scars and thyroid normal to palpation  RESP: lungs " clear to auscultation - no rales, rhonchi or wheezes  BREAST: normal without masses, tenderness or nipple discharge and no palpable axillary masses or adenopathy  CV: regular rate and rhythm, normal S1 S2, no S3 or S4, no murmur, click or rub, no peripheral edema and peripheral pulses strong  ABDOMEN: soft, nontender, no hepatosplenomegaly, no masses and bowel sounds normal   (female): normal female external genitalia, normal urethral meatus, vaginal mucosa pink, moist, well rugated, and normal cervix/adnexa/uterus without masses or discharge. IUD string identified and pap and chlamydia & gonorrhea swab obtained   MS: no gross musculoskeletal defects noted, no edema  SKIN: no suspicious lesions or rashes  NEURO: Normal strength and tone, mentation intact and speech normal  PSYCH: mentation appears normal, affect normal/bright    Diagnostic Test Results:  Labs reviewed in Epic    ASSESSMENT/PLAN:   Yady was seen today for physical.    Diagnoses and all orders for this visit:    Routine general medical examination at a health care facility  If pap NILM- repeat in 3 yrs 1/2027    Adjustment disorder with mixed anxiety and depressed mood  Plan: selective serotonin reuptake inhibitor- SE low libido/ & anorgasmia  For now see if lowering dose helps the mood and decreases the side effects  I do recommend to see MTM  Consider SNRI in future Cymbalta or Priistique  Consider gensight- pros and cons with MTM  If MTM consult does not happen  Follow up with me in 4 weeks  Earlier if more concerns   -     FLUoxetine (PROZAC) 20 MG capsule; Take 1 capsule (20 mg) by mouth daily  -     Med Therapy Management Referral      4/5/2023    12:02 PM 11/28/2023     5:52 PM 1/11/2024     8:40 AM   PHQ   PHQ-9 Total Score 7 3 2   Q9: Thoughts of better off dead/self-harm past 2 weeks Not at all Not at all Not at all          3/14/2023     9:09 AM 4/5/2023    12:04 PM 1/11/2024     8:40 AM   ROLLY-7 SCORE   Total Score 3 (minimal anxiety) 7  (mild anxiety)    Total Score 3 7 5        Mild persistent asthma without complication  Plan: well controlled asthma   ACT reviewed  Refill for  inhaler , sent for 1 yr  -     albuterol (PROAIR HFA/PROVENTIL HFA/VENTOLIN HFA) 108 (90 Base) MCG/ACT inhaler; Inhale 2 puffs into the lungs every 6 hours as needed for shortness of breath or wheezing  -     fluticasone (ARNUITY ELLIPTA) 100 MCG/ACT inhaler; Inhale 1 puff into the lungs daily    Acne vulgaris  Comments:  add sprinolactone. BMP today  Orders:  -     spironolactone (ALDACTONE) 100 MG tablet; Take 1 tablet (100 mg) by mouth daily    Encounter for vitamin deficiency screening  -     Vitamin D Deficiency    Screening for diabetes mellitus  -   -     Basic metabolic panel  (Ca, Cl, CO2, Creat, Gluc, K, Na, BUN)    Screening for lipid disorders  - -     Lipid panel reflex to direct LDL Fasting    Screening for STDs (sexually transmitted diseases)  -     Chlamydia trachomatis/Neisseria gonorrhoeae by PCR- VAGINAL SELF-SWAB; Future  -     Chlamydia trachomatis/Neisseria gonorrhoeae by PCR- VAGINAL SELF-SWAB    Screening for cervical cancer  -     PAP screen only - recommended age 21 - 24 years    Other orders  -     PRIMARY CARE FOLLOW-UP SCHEDULING; Future  -     REVIEW OF HEALTH MAINTENANCE PROTOCOL ORDERS  -     Pneumococcal 20 Valent Conjugate (Prevnar 20)  -     COVID-19 12+ (2023-24) (PFIZER)  -     PRIMARY CARE FOLLOW-UP SCHEDULING; Future        Patient has been advised of split billing requirements and indicates understanding: Yes      COUNSELING:  Reviewed preventive health counseling, as reflected in patient instructions       Regular exercise       Healthy diet/nutrition       Vision screening       Hearing screening       Alcohol Use       Contraception       Family planning       Folic Acid       Advance Care Planning        She reports that she has never smoked. She has never used smokeless tobacco.          Adriana Jimenez MD  [facility]  [facility] [place]

## 2024-01-11 NOTE — NURSING NOTE
Per orders of AS, injection of covid, pneumonia given by Marylu Vasquez RN. Prior to immunization administration, verified patients identity using patient s name and date of birth. Patient instructed to remain in clinic for 15 minutes afterwards, and to report any adverse reaction to me or clinic staff immediately.    Marylu Vasquez RN on 1/11/2024 at 8:59 AM.    Please see Immunization Activity for additional information.

## 2024-01-15 ENCOUNTER — TELEPHONE (OUTPATIENT)
Dept: FAMILY MEDICINE | Facility: CLINIC | Age: 24
End: 2024-01-15
Payer: COMMERCIAL

## 2024-01-15 LAB
BKR LAB AP GYN ADEQUACY: NORMAL
BKR LAB AP GYN INTERPRETATION: NORMAL
BKR LAB AP HPV REFLEX: NO
BKR LAB AP PREVIOUS ABNORMAL: NORMAL
PATH REPORT.COMMENTS IMP SPEC: NORMAL
PATH REPORT.COMMENTS IMP SPEC: NORMAL
PATH REPORT.RELEVANT HX SPEC: NORMAL

## 2024-01-15 NOTE — TELEPHONE ENCOUNTER
MTM referral from: [facility] visit (referral by provider)    MTM referral outreach attempt #2 on January 15, 2024 at 12:47 PM      Outcome: Patient not reachable after several attempts, will route to MTM Pharmacist/Provider as an FYI.  [facility] scheduling number is .  Thank you for the referral.    Use medica uplan for the carrier/Plan on the flowsheet      [email or web address] Message Sent    Cece Emerson CPhT  MT

## 2024-01-27 DIAGNOSIS — J45.30 MILD PERSISTENT ASTHMA WITHOUT COMPLICATION: ICD-10-CM

## 2024-01-29 RX ORDER — FLUTICASONE FUROATE 100 UG/1
POWDER RESPIRATORY (INHALATION)
Qty: 30 EACH | Refills: 0 | Status: SHIPPED | OUTPATIENT
Start: 2024-01-29 | End: 2024-02-21

## 2024-02-21 DIAGNOSIS — J45.30 MILD PERSISTENT ASTHMA WITHOUT COMPLICATION: ICD-10-CM

## 2024-02-21 RX ORDER — FLUTICASONE FUROATE 100 UG/1
POWDER RESPIRATORY (INHALATION)
Qty: 30 EACH | Refills: 6 | Status: SHIPPED | OUTPATIENT
Start: 2024-02-21 | End: 2024-08-23

## 2024-03-13 DIAGNOSIS — J45.30 MILD PERSISTENT ASTHMA WITHOUT COMPLICATION: ICD-10-CM

## 2024-03-13 RX ORDER — FLUTICASONE FUROATE 100 UG/1
POWDER RESPIRATORY (INHALATION)
OUTPATIENT
Start: 2024-03-13

## 2024-04-02 DIAGNOSIS — J45.30 MILD PERSISTENT ASTHMA WITHOUT COMPLICATION: ICD-10-CM

## 2024-04-02 RX ORDER — MONTELUKAST SODIUM 10 MG/1
TABLET ORAL
Qty: 90 TABLET | Refills: 0 | Status: SHIPPED | OUTPATIENT
Start: 2024-04-02 | End: 2024-04-09

## 2024-04-09 ENCOUNTER — VIRTUAL VISIT (OUTPATIENT)
Dept: PHARMACY | Facility: CLINIC | Age: 24
End: 2024-04-09
Attending: FAMILY MEDICINE
Payer: COMMERCIAL

## 2024-04-09 DIAGNOSIS — J45.30 MILD PERSISTENT ASTHMA WITHOUT COMPLICATION: ICD-10-CM

## 2024-04-09 DIAGNOSIS — L70.0 ACNE VULGARIS: ICD-10-CM

## 2024-04-09 DIAGNOSIS — F43.23 ADJUSTMENT DISORDER WITH MIXED ANXIETY AND DEPRESSED MOOD: Primary | ICD-10-CM

## 2024-04-09 PROCEDURE — 99605 MTMS BY PHARM NP 15 MIN: CPT | Mod: 95 | Performed by: PHARMACIST

## 2024-04-09 PROCEDURE — 99607 MTMS BY PHARM ADDL 15 MIN: CPT | Mod: 95 | Performed by: PHARMACIST

## 2024-04-09 NOTE — PROGRESS NOTES
Medication Therapy Management (MTM) Encounter    ASSESSMENT:                            Medication Adherence/Access: No issues identified    Depression/Anxiety:    Patient may experience side effect benefit by reducing the dose of fluoxetine. However, considering the patient's medication history with escitalopram and bupropion, an SNRI would be the next appropriate agent.  Discussed that SNRI medications (duloxetine, venlafaxine, desvenlafaxine) could still contribute to sexual dysfunction. Duloxetine has less risk of sexual dysfunction compared to selective serotonin reuptake inhibitors and the least risk of the SNRIs. Patient may benefit from tapering down to fluoxetine and starting duloxetine.     Asthma:   Stable    Acne:   Stable     PLAN:                            Go down to the Fluoxetine 20 mg dose and take for one week and stop.   Start taking Duloxetine 30 mg daily after one week of 20 mg.   Continue to monitor your mood and how you feel while tapering off the fluoxetine and starting the duloxetine. If your mood becomes worse or have thoughts of suicide please let Dr. Jimenez know right away.      Follow-up: [employer] Message After speaking with Dr. Jimenez.     SUBJECTIVE/OBJECTIVE:                          Yady Sellers is a 23 year old female contacted via secure video for an initial visit. She was referred to me from Dr. Jimenez.      Reason for visit: Medication Review, Depression/Anxiety.    Allergies/ADRs: None  Past Medical History: Reviewed in chart  Tobacco: She reports that she has never smoked. She has never used smokeless tobacco.  Alcohol: 1 beverages maybe every other week  Other: Takes a THC edible maybe once per month.     Medication Adherence/Access: no issues reported    Depression/Anxiety:   Fluoxetine 40 mg daily-  prescription for 20 mg was sent in with anticipation for medication transition   Has been experiencing low sex drive & anorgasmia.  Noticed this more when increasing the dose from  20 to 40 mg.   Patient states that the medication has been helpful for depression/anxiety and mood is good. Does state that affect is often flat but this is not something that is new.     Medication History:   Bupropion  mg- stopped because it was contributing to anxiety   Buspirone 10 mg twice daily-  can't remember why it was stopped, think that it may have been the twice daily dosing  Escitalopram 10 & 20 mg- stopped due to SE low libido/ & anorgasmia and it wasn't working as well.   Fluoxetine 10, 20, & 40 mg - First stated in summer of 2022.   Propranolol as needed but never took it.     Asthma:   Albuterol HFA 2 puffs every 6 hours as needed- haven't had to use   Arnuity Ellipta 100  mcg/act 1 puff daily   Montelukast 10 mg at bedtime - not taking- did not seem to be helping.     Breathing is good no issues reported with medications.     Acne:   Spironolactone 100 mg daily - has been helpful acne. No dizziness or lightheadedness.     No issues reported       Today's Vitals: There were no vitals taken for this visit.  ----------------      I spent 26 minutes with this patient today. I offer these suggestions for consideration by Dr. Jimenez. A copy of the visit note was provided to the patient's provider(s).    A summary of these recommendations was sent via [employer]ono[employer].    Margaret Delarosa PharmD  Medication Therapy Management Resident, [facility]  Pager: [phone]  Email: [email or web address]    Preceptor cosignature: Yady Sellers was seen independently by Dr. Delarosa. I have reviewed the assessment and plan. Inés Dale, PharmD, ALAYNA, BCACP    Telemedicine Visit Details  Type of service:  Video Conference via [employer]  Joined the call at 4/9/2024, 1:02:16 pm.  Left the call at 4/9/2024, 1:28:56 pm.  You were on the call for 26 minutes 39 seconds .     Medication Therapy Recommendations  Adjustment disorder with mixed anxiety and depressed mood    Current Medication:  FLUoxetine (PROZAC) 20 MG capsule (Discontinued)   Rationale: Undesirable effect - Adverse medication event - Safety   Recommendation: Change Medication - DULoxetine HCl 30 MG Csdr   Status: Accepted per Provider

## 2024-04-10 ENCOUNTER — MYC REFILL (OUTPATIENT)
Dept: FAMILY MEDICINE | Facility: CLINIC | Age: 24
End: 2024-04-10
Payer: COMMERCIAL

## 2024-04-10 DIAGNOSIS — F43.23 ADJUSTMENT DISORDER WITH MIXED ANXIETY AND DEPRESSED MOOD: ICD-10-CM

## 2024-04-11 NOTE — TELEPHONE ENCOUNTER
4/5/2023    12:02 PM 11/28/2023     5:52 PM 1/11/2024     8:40 AM   PHQ   PHQ-9 Total Score 7 3 2   Q9: Thoughts of better off dead/self-harm past 2 weeks Not at all Not at all Not at all          3/14/2023     9:09 AM 4/5/2023    12:04 PM 1/11/2024     8:40 AM   ROLLY-7 SCORE   Total Score 3 (minimal anxiety) 7 (mild anxiety)    Total Score 3 7 5

## 2024-04-12 ENCOUNTER — ALLIED HEALTH/NURSE VISIT (OUTPATIENT)
Dept: FAMILY MEDICINE | Facility: CLINIC | Age: 24
End: 2024-04-12
Payer: COMMERCIAL

## 2024-04-12 DIAGNOSIS — Z11.1 SCREENING EXAMINATION FOR PULMONARY TUBERCULOSIS: Primary | ICD-10-CM

## 2024-04-12 PROCEDURE — 99207 PR NO CHARGE NURSE ONLY: CPT

## 2024-04-12 PROCEDURE — 86580 TB INTRADERMAL TEST: CPT

## 2024-04-15 ENCOUNTER — ALLIED HEALTH/NURSE VISIT (OUTPATIENT)
Dept: FAMILY MEDICINE | Facility: CLINIC | Age: 24
End: 2024-04-15
Payer: COMMERCIAL

## 2024-04-15 DIAGNOSIS — Z11.1 SCREENING EXAMINATION FOR PULMONARY TUBERCULOSIS: Primary | ICD-10-CM

## 2024-04-15 LAB
PPDINDURATION: 0 MM (ref 0–4.99)
PPDREDNESS: NORMAL

## 2024-04-15 PROCEDURE — 99207 PR NO CHARGE NURSE ONLY: CPT

## 2024-04-15 RX ORDER — DULOXETIN HYDROCHLORIDE 30 MG/1
30 CAPSULE, DELAYED RELEASE ORAL DAILY
Qty: 30 CAPSULE | Refills: 0 | Status: SHIPPED | OUTPATIENT
Start: 2024-04-15 | End: 2024-05-29

## 2024-04-15 NOTE — PROGRESS NOTES
Adriana Jimenez MD Smith, Mackenzie B formerly [facility]  Sure, just checked the message.  loni          Previous Messages       ----- Message -----  From: Margaret Delarosa RPH  Sent: 4/9/2024   5:08 PM CDT  To: Adriana Jimenez MD  Subject: Fluoxetine to Duloxetine Transition.            Hell Dr. Jimenez,    I was able to see Yady today.  We discussed transitioning off of Fluoxetine and starting Duloxetine and wanted to get your thoughts. Patient's primary concern was decreased libido and anorgasmia. Duloxetine has less risk of sexual dysfunction compared to SSRIs and other SNRIs.    The patient is currently taking fluoxetine 40 mg daily. I told the patient that we could go down to the 20 mg dose for 1 week and then transition onto Duloxetine 30 mg daily with a modified cross taper.    If you agree, I would be happy to send in a prescription for the duloxetine and communicate with the patient.    Thank you for your time,    Margaret Delarosa RPH on 4/9/2024 at 5:08 PM

## 2024-04-15 NOTE — PROGRESS NOTES
Patient is here today for a Mantoux (TST) test results.    Did patient return to clinic 48-72 hours from Mantoux (TST) placement: Yes -     PPD Induration   Date Value Ref Range Status   04/15/2024 0 0 - 4.99 mm Final     PPD Redness   Date Value Ref Range Status   04/15/2024 Not Present  Final         Induration Size? Induration <5mm - Enter results in Enter/Edit Activity. Route results to ordering provider.     Patient needs form signed? No    Patient reports having previously had the BCG Vaccine: No    Does patient need a two step? No    Marylu Vasquez RN

## 2024-04-28 DIAGNOSIS — F43.23 ADJUSTMENT DISORDER WITH MIXED ANXIETY AND DEPRESSED MOOD: ICD-10-CM

## 2024-04-28 NOTE — TELEPHONE ENCOUNTER
Medication Question or Refill        What medication are you calling about (include dose and sig)?: duloxetine 30 mg    Preferred Pharmacy:   [id number] - [facility] [address] AT [facility] [address]  [facility] [address]  Phone: [phone] Fax: [phone]      Controlled Substance Agreement on file:   CSA -- Patient Level:    CSA: None found at the patient level.       Who prescribed the medication?: Dr. Jimenez    Do you need a refill? No    When did you use the medication last? Haven't used it yet    Patient offered an appointment? No    Do you have any questions or concerns?  Yes: patient wondering if prescription can be resent to above pharmacy. It was sent to the mail order pharmacy by mistake.       Could we send this information to you in Mojix or would you prefer to receive a phone call?:   Patient would like to be contacted via Mojix

## 2024-04-29 RX ORDER — DULOXETIN HYDROCHLORIDE 30 MG/1
30 CAPSULE, DELAYED RELEASE ORAL DAILY
Qty: 30 CAPSULE | Refills: 0 | OUTPATIENT
Start: 2024-04-29

## 2024-05-25 DIAGNOSIS — F43.23 ADJUSTMENT DISORDER WITH MIXED ANXIETY AND DEPRESSED MOOD: ICD-10-CM

## 2024-05-28 ENCOUNTER — MYC MEDICAL ADVICE (OUTPATIENT)
Dept: PHARMACY | Facility: CLINIC | Age: 24
End: 2024-05-28
Payer: COMMERCIAL

## 2024-05-28 DIAGNOSIS — F43.23 ADJUSTMENT DISORDER WITH MIXED ANXIETY AND DEPRESSED MOOD: Primary | ICD-10-CM

## 2024-05-29 RX ORDER — DULOXETIN HYDROCHLORIDE 30 MG/1
30 CAPSULE, DELAYED RELEASE ORAL DAILY
Qty: 30 CAPSULE | Refills: 1 | Status: SHIPPED | OUTPATIENT
Start: 2024-05-29 | End: 2024-06-21

## 2024-06-20 DIAGNOSIS — F43.23 ADJUSTMENT DISORDER WITH MIXED ANXIETY AND DEPRESSED MOOD: ICD-10-CM

## 2024-06-21 NOTE — TELEPHONE ENCOUNTER
Margaret/Dr. Jimenez,  Patient called clinic to check-in.  States that she did restart fluoxetine with the supply she had previously.  Has now been on it for about three weeks and is feeling a lot better.  Pended refill if approved.    Melanie PINA RN

## 2024-07-24 ENCOUNTER — ALLIED HEALTH/NURSE VISIT (OUTPATIENT)
Dept: FAMILY MEDICINE | Facility: CLINIC | Age: 24
End: 2024-07-24
Payer: COMMERCIAL

## 2024-07-24 VITALS — TEMPERATURE: 97.5 F

## 2024-07-24 DIAGNOSIS — Z23 ENCOUNTER FOR IMMUNIZATION: Primary | ICD-10-CM

## 2024-07-24 DIAGNOSIS — Z11.1 SCREENING EXAMINATION FOR PULMONARY TUBERCULOSIS: ICD-10-CM

## 2024-07-24 PROCEDURE — 86580 TB INTRADERMAL TEST: CPT

## 2024-07-24 PROCEDURE — 90471 IMMUNIZATION ADMIN: CPT

## 2024-07-24 PROCEDURE — 99207 PR NO CHARGE NURSE ONLY: CPT

## 2024-07-24 PROCEDURE — 90746 HEPB VACCINE 3 DOSE ADULT IM: CPT

## 2024-08-23 DIAGNOSIS — J45.30 MILD PERSISTENT ASTHMA WITHOUT COMPLICATION: ICD-10-CM

## 2024-08-23 RX ORDER — FLUTICASONE FUROATE 100 UG/1
POWDER RESPIRATORY (INHALATION)
Qty: 30 EACH | Refills: 2 | Status: SHIPPED | OUTPATIENT
Start: 2024-08-23

## 2024-08-23 NOTE — TELEPHONE ENCOUNTER
Medication is being filled for 1 time refill only due to:  Patient needs to be seen because due for 6 month appt. Due to update ACT  Linda Gonzalez RN, BSN  [facility]

## 2024-09-29 DIAGNOSIS — F43.23 ADJUSTMENT DISORDER WITH MIXED ANXIETY AND DEPRESSED MOOD: ICD-10-CM

## 2024-11-04 ASSESSMENT — PATIENT HEALTH QUESTIONNAIRE - PHQ9: SUM OF ALL RESPONSES TO PHQ QUESTIONS 1-9: 11

## 2024-11-06 ASSESSMENT — PATIENT HEALTH QUESTIONNAIRE - PHQ9: SUM OF ALL RESPONSES TO PHQ QUESTIONS 1-9: 6

## 2024-11-26 ASSESSMENT — PATIENT HEALTH QUESTIONNAIRE - PHQ9: SUM OF ALL RESPONSES TO PHQ QUESTIONS 1-9: 6

## 2024-12-05 ENCOUNTER — VIRTUAL VISIT (OUTPATIENT)
Dept: PHARMACY | Facility: CLINIC | Age: 24
End: 2024-12-05
Payer: COMMERCIAL

## 2024-12-05 DIAGNOSIS — L70.0 ACNE VULGARIS: ICD-10-CM

## 2024-12-05 DIAGNOSIS — Z97.5 IUD (INTRAUTERINE DEVICE) IN PLACE: ICD-10-CM

## 2024-12-05 DIAGNOSIS — J45.30 MILD PERSISTENT ASTHMA WITHOUT COMPLICATION: ICD-10-CM

## 2024-12-05 DIAGNOSIS — F43.23 ADJUSTMENT DISORDER WITH MIXED ANXIETY AND DEPRESSED MOOD: Primary | ICD-10-CM

## 2024-12-05 NOTE — PATIENT INSTRUCTIONS
"Recommendations from today's MTM visit:                                                    MTM (medication therapy management) is a service provided by a clinical pharmacist designed to help you get the most of out of your medicines.   Today we reviewed what your medicines are for, how to know if they are working, that your medicines are safe and how to make your medicine regimen as easy as possible.      Schedule an appointment with Dr. Jimenez to talk about refilling your prescriptions.  I mentioned to Dr. Jimenez that we had talked about placing a referral for our behavioral health clinician to help you get established with a therapist or other mental health provider - you can ask her about this at your next visit.    Follow-up: 6 months, sooner if needed    It was great speaking with you today.  I value your experience and would be very thankful for your time in providing feedback in our clinic survey. In the next few days, you may receive an email or text message from [employer] with a link to a survey related to your  clinical pharmacist.\"     To schedule another MTM appointment, please call the clinic directly or you may call the MTM scheduling line at [phone] or toll-free at [phone].     My Clinical Pharmacist's contact information:                                                      Please feel free to contact me with any questions or concerns you have.      Jennifer Saravia, PharmD  Medication Therapy Management Resident, [facility]  Phone: [phone]    Inés Dale, Pharm.D., M.B.A., [facility]  Medication Therapy Management Pharmacist, [facility]  Phone: [phone]   "

## 2024-12-05 NOTE — PROGRESS NOTES
Medication Therapy Management (MTM) Encounter    ASSESSMENT:                            Medication Adherence/Access: No issues identified.    Asthma   Stable.     Mental Health   Anxiety and Depression  Patient may benefit from referral to a behavioral health clinician to help establishing care with a mental health provider. Symptoms are stable per patient report.     Contraception:   Stable.    Acne:  Stable.    PLAN:                            Schedule an appointment with Dr. Jimenez to talk about refilling your prescriptions.  I mentioned to Dr. Jimenez that we had talked about placing a referral for our behavioral health clinician to help you get established with a therapist or other mental health provider - you can ask her about this at your next visit.    Follow-up: 6 months, sooner if needed    SUBJECTIVE/OBJECTIVE:                          Yady Sellers is a 24 year old female contacted via secure video for a follow-up visit.       Reason for visit: mental health follow-up.    Allergies/ADRs: Reviewed in chart  Past Medical History: Reviewed in chart  Tobacco: She reports that she has never smoked. She has never used smokeless tobacco.  Alcohol: a few drinks every other week  Other: Takes a THC edible maybe once per month  Medication Adherence/Access: no issues reported.    Asthma   Arnuity Ellipta 100 mcg 1 puff daily  Albuterol HFA as needed - has not needed  Patient rinses their mouth after using steroid inhaler.   Patient reports no current medication side effects.      Patient reports the following symptoms: none.     Mental Health   Anxiety and Depression  Fluoxetine 20 mg once daily  Patient reports some issues with sex drive, but better than it was on the higher dose. Feels this is manageable and is a good balance with how well it is working for her mental health.  Patient is interested in establishing with a therapist     Contraception:   Mirena IUD  Patient reports no concerns    Acne:  Spironolactone  100 mg daily  Patient reports no concerns  ----------------    I spent 10 minutes with this patient today. I offer these suggestions for consideration by Dr. Jimenez. A copy of the visit note was provided to the patient's provider(s).    A summary of these recommendations was sent via [employer].    Tiff MattaD  Medication Therapy Management Pharmacy Resident  [facility]    Telemedicine Visit Details  The patient's medications can be safely assessed via a telemedicine encounter.  Type of service:  Video Conference via [employer]  Originating Location (pt. Location): Other work    Distant Location (provider location):  On-site  Joined the call at 12/5/2024, 9:01:14 am.  Left the call at 12/5/2024, 9:11:07 am.     Medication Therapy Recommendations  No medication therapy recommendations to display

## 2024-12-12 ENCOUNTER — PATIENT OUTREACH (OUTPATIENT)
Dept: CARE COORDINATION | Facility: CLINIC | Age: 24
End: 2024-12-12
Payer: COMMERCIAL

## 2024-12-17 DIAGNOSIS — L70.0 ACNE VULGARIS: ICD-10-CM

## 2024-12-17 DIAGNOSIS — F43.23 ADJUSTMENT DISORDER WITH MIXED ANXIETY AND DEPRESSED MOOD: ICD-10-CM

## 2024-12-26 ENCOUNTER — PATIENT OUTREACH (OUTPATIENT)
Dept: CARE COORDINATION | Facility: CLINIC | Age: 24
End: 2024-12-26
Payer: COMMERCIAL

## 2024-12-28 DIAGNOSIS — F43.23 ADJUSTMENT DISORDER WITH MIXED ANXIETY AND DEPRESSED MOOD: ICD-10-CM

## 2025-01-05 DIAGNOSIS — L70.0 ACNE VULGARIS: ICD-10-CM

## 2025-01-06 RX ORDER — SPIRONOLACTONE 100 MG/1
100 TABLET, FILM COATED ORAL DAILY
Qty: 90 TABLET | Refills: 1 | Status: SHIPPED | OUTPATIENT
Start: 2025-01-06

## 2025-02-14 PROBLEM — D17.30 LIPOMA OF SKIN AND SUBCUTANEOUS TISSUE: Status: ACTIVE | Noted: 2025-02-14

## 2025-02-14 PROBLEM — G89.29 CHRONIC BILATERAL LOW BACK PAIN WITHOUT SCIATICA: Status: ACTIVE | Noted: 2025-02-14

## 2025-02-14 PROBLEM — M54.50 CHRONIC BILATERAL LOW BACK PAIN WITHOUT SCIATICA: Status: ACTIVE | Noted: 2025-02-14

## 2025-03-11 ENCOUNTER — THERAPY VISIT (OUTPATIENT)
Dept: PHYSICAL THERAPY | Facility: CLINIC | Age: 25
End: 2025-03-11
Payer: COMMERCIAL

## 2025-03-11 DIAGNOSIS — G89.29 CHRONIC BILATERAL LOW BACK PAIN WITHOUT SCIATICA: ICD-10-CM

## 2025-03-11 DIAGNOSIS — M54.50 CHRONIC BILATERAL LOW BACK PAIN WITHOUT SCIATICA: ICD-10-CM

## 2025-03-11 PROCEDURE — 97161 PT EVAL LOW COMPLEX 20 MIN: CPT | Mod: GP | Performed by: PHYSICAL THERAPIST

## 2025-03-11 PROCEDURE — 97110 THERAPEUTIC EXERCISES: CPT | Mod: GP | Performed by: PHYSICAL THERAPIST

## 2025-03-11 PROCEDURE — 97112 NEUROMUSCULAR REEDUCATION: CPT | Mod: GP | Performed by: PHYSICAL THERAPIST

## 2025-03-11 NOTE — PROGRESS NOTES
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, pain after running for 20 mins, pt recently tried to correct her form by not over archnig her back which has been helping     Date of onset: 24    Relevant medical history: Asthma; Depression   Dates & types of surgery: PE tubes     Prior diagnostic imaging/testing results:       Prior therapy history for the same diagnosis, illness or injury: No      Prior Level of Function  Transfers:   Ambulation: Independent  ADL: Independent  IADL: Driving, Work    Living Environment  Social support: With a significant other or spouse   Type of home: House   Stairs to enter the home: Yes 6 Is there a railing: Yes     Ramp: No   Stairs inside the home: Yes 20 Is there a railing: Yes     Help at home: None  Equipment owned:       Employment: No    Hobbies/Interests: volleyball singing running knitting    Patient goals for therapy: run more regularly and longer distances    Pain assessment: Pain present  Location: LBP /Ratin/10      Objective   LUMBAR SPINE EVALUATION  PAIN: Pain Level at Rest: 1/10  Pain Level with Use: 4/10  Pain Location: lumbar spine  Pain Quality: Dull  Pain Frequency: intermittent  Pain is Worst: daytime  Pain is Exacerbated By: standing for 30 mins, running   Pain is Relieved By: NSAIDs  Pain Progression: Worsened  INTEGUMENTARY (edema, incisions):   POSTURE:   GAIT:   Weightbearing Status:   Assistive Device(s):   Gait Deviations:   BALANCE/PROPRIOCEPTION: WFL  WEIGHTBEARING ALIGNMENT: WFL  NON-WEIGHTBEARING ALIGNMENT:    ROM:   (Degrees)  Left AROM Left PROM  Right AROM Right PROM   Hip Flexion wnl  wnl    Hip Extension wnl  wnl    Hip Abduction wnl  wnl    Hip Adduction       Hip Internal Rotation wnl tightness wnl tightness   Hip External Rotation wnl tightness wnl Tightness    Knee Flexion       Knee Extension       Lumbar Side glide Painful mild loss  Painful mild loss    Lumbar Flexion Painful able to reach hands to floor   Lumbar Extension 40% in standing - painful; prone press ups painful after 10 reps    Pain:   End feel:   PELVIC/SI SCREEN:   STRENGTH:     MYOTOMES:  SLR flexion 5/5; ABD L 4/5; R 5/5; ext 5/5 B; ext with knee bent 5/5 B; clamshell 5/5 B   DTR S:   CORD SIGNS:   DERMATOMES:   NEURAL TENSION: Lumbar WNL  FLEXIBILITY: Decreased hip ER L, Decreased piriformis L, Decreased hip flexors L, Decreased hip ER R, Decreased piriformis R, Decreased hip flexors R  LUMBAR/HIP Special Tests:    PELVIS/SI SPECIAL TESTS:   FUNCTIONAL TESTS:  mild LBP with squatting   PALPATION:   SPINAL SEGMENTAL CONCLUSIONS:       Assessment & Plan   CLINICAL IMPRESSIONS  Medical Diagnosis: Chronic bilateral low back pain without sciatica    Treatment Diagnosis: LBP chronic   Impression/Assessment: Patient is a 24 year old female with Chronic LBP complaints.  The following significant findings have been identified: Pain, Decreased ROM/flexibility, Decreased joint mobility, Decreased strength, Decreased activity tolerance, and Impaired posture. These impairments interfere with their ability to perform self care tasks, work tasks, recreational activities, and community mobility as compared to previous level of function.     Clinical Decision Making (Complexity):  Clinical Presentation: Stable/Uncomplicated  Clinical Presentation Rationale: based on medical and personal factors listed in PT evaluation  Clinical Decision Making (Complexity): Low complexity    PLAN OF CARE  Treatment Interventions:  Interventions: Manual Therapy, Neuromuscular Re-education,  Therapeutic Activity, Therapeutic Exercise    Long Term Goals     PT Goal 1  Goal Identifier: Running  Goal Description: Able to run for 45 mins without LBP  Rationale: to maximize safety and independence within the community  Target Date: 05/20/25      Frequency of Treatment: 2x/month  Duration of Treatment: 10 weeks    Recommended Referrals to Other Professionals: Physical Therapy  Education Assessment:   Learner/Method: Patient    Risks and benefits of evaluation/treatment have been explained.   Patient/Family/caregiver agrees with Plan of Care.     Evaluation Time:     PT Eval, Low Complexity Minutes (90140): 20       Signing Clinician: Neha Moise PT

## 2025-04-22 ENCOUNTER — THERAPY VISIT (OUTPATIENT)
Age: 25
End: 2025-04-22
Payer: COMMERCIAL

## 2025-04-22 DIAGNOSIS — M54.50 CHRONIC BILATERAL LOW BACK PAIN WITHOUT SCIATICA: Primary | ICD-10-CM

## 2025-04-22 DIAGNOSIS — G89.29 CHRONIC BILATERAL LOW BACK PAIN WITHOUT SCIATICA: Primary | ICD-10-CM

## 2025-04-22 PROCEDURE — 97110 THERAPEUTIC EXERCISES: CPT | Mod: GP

## 2025-04-30 ENCOUNTER — TELEPHONE (OUTPATIENT)
Dept: FAMILY MEDICINE | Facility: CLINIC | Age: 25
End: 2025-04-30
Payer: COMMERCIAL

## 2025-04-30 NOTE — TELEPHONE ENCOUNTER
Reason for Call:  Appointment Request    Patient requesting this type of appt:  Rabies Vaccine     Requested provider: MA/DIANNA/LPN Visit    Reason patient unable to be scheduled: Not within requested timeframe    When does patient want to be seen/preferred time:  05/29/2025      Comments: Patient is scheudled for a travel med appt on 05/29/2025 at 10:15 am. Pt would prefer to come in that day after travel med appt.     Could we send this information to you in Filecubed or would you prefer to receive a phone call?:   Patient would like to be contacted via Filecubed    Call taken on 4/30/2025 at 4:07 PM by Von Calderon

## 2025-04-30 NOTE — TELEPHONE ENCOUNTER
Called Patient and explained They will Start Any Immunizations needed at the Consult-she was Informed Otherwise By  Patient Understood and Appreciated Call  Susan [facility] Coordinator

## 2025-05-06 ENCOUNTER — THERAPY VISIT (OUTPATIENT)
Age: 25
End: 2025-05-06
Payer: COMMERCIAL

## 2025-05-06 DIAGNOSIS — M54.50 ACUTE BILATERAL LOW BACK PAIN WITHOUT SCIATICA: Primary | ICD-10-CM

## 2025-05-06 PROCEDURE — 97140 MANUAL THERAPY 1/> REGIONS: CPT | Mod: GP | Performed by: PHYSICAL THERAPIST

## 2025-05-06 PROCEDURE — 97112 NEUROMUSCULAR REEDUCATION: CPT | Mod: GP | Performed by: PHYSICAL THERAPIST

## 2025-05-06 PROCEDURE — 97110 THERAPEUTIC EXERCISES: CPT | Mod: GP | Performed by: PHYSICAL THERAPIST

## 2025-05-29 ENCOUNTER — OFFICE VISIT (OUTPATIENT)
Dept: FAMILY MEDICINE | Facility: CLINIC | Age: 25
End: 2025-05-29
Payer: COMMERCIAL

## 2025-05-29 VITALS
SYSTOLIC BLOOD PRESSURE: 113 MMHG | TEMPERATURE: 97.9 F | BODY MASS INDEX: 21.67 KG/M2 | OXYGEN SATURATION: 96 % | HEART RATE: 80 BPM | HEIGHT: 72 IN | WEIGHT: 160 LBS | RESPIRATION RATE: 16 BRPM | DIASTOLIC BLOOD PRESSURE: 72 MMHG

## 2025-05-29 DIAGNOSIS — Z71.84 TRAVEL ADVICE ENCOUNTER: Primary | ICD-10-CM

## 2025-05-29 PROCEDURE — 90471 IMMUNIZATION ADMIN: CPT | Performed by: NURSE PRACTITIONER

## 2025-05-29 PROCEDURE — 3078F DIAST BP <80 MM HG: CPT | Performed by: NURSE PRACTITIONER

## 2025-05-29 PROCEDURE — 90472 IMMUNIZATION ADMIN EACH ADD: CPT | Performed by: NURSE PRACTITIONER

## 2025-05-29 PROCEDURE — 1126F AMNT PAIN NOTED NONE PRSNT: CPT | Performed by: NURSE PRACTITIONER

## 2025-05-29 PROCEDURE — 90691 TYPHOID VACCINE IM: CPT | Performed by: NURSE PRACTITIONER

## 2025-05-29 PROCEDURE — 99403 PREV MED CNSL INDIV APPRX 45: CPT | Mod: 25 | Performed by: NURSE PRACTITIONER

## 2025-05-29 PROCEDURE — 3074F SYST BP LT 130 MM HG: CPT | Performed by: NURSE PRACTITIONER

## 2025-05-29 PROCEDURE — 90675 RABIES VACCINE IM: CPT | Performed by: NURSE PRACTITIONER

## 2025-05-29 PROCEDURE — 90656 IIV3 VACC NO PRSV 0.5 ML IM: CPT | Performed by: NURSE PRACTITIONER

## 2025-05-29 RX ORDER — AZITHROMYCIN 500 MG/1
500 TABLET, FILM COATED ORAL DAILY
Qty: 3 TABLET | Refills: 0 | Status: SHIPPED | OUTPATIENT
Start: 2025-05-29 | End: 2025-06-01

## 2025-05-29 RX ORDER — ONDANSETRON 4 MG/1
4 TABLET, ORALLY DISINTEGRATING ORAL EVERY 8 HOURS PRN
Qty: 10 TABLET | Refills: 0 | Status: SHIPPED | OUTPATIENT
Start: 2025-05-29

## 2025-05-29 ASSESSMENT — PAIN SCALES - GENERAL: PAINLEVEL_OUTOF10: NO PAIN (0)

## 2025-05-29 NOTE — NURSING NOTE
Prior to immunization administration, verified patients identity using patient s name and date of birth. Please see Immunization Activity for additional information.     Screening Questionnaire for Adult Immunization    Are you sick today?   No   Do you have allergies to medications, food, a vaccine component or latex?   No   Have you ever had a serious reaction after receiving a vaccination?   No   Do you have a long-term health problem with heart, lung, kidney, or metabolic disease (e.g., diabetes), asthma, a blood disorder, no spleen, complement component deficiency, a cochlear implant, or a spinal fluid leak?  Are you on long-term aspirin therapy?   No   Do you have cancer, leukemia, HIV/AIDS, or any other immune system problem?   No   Do you have a parent, brother, or sister with an immune system problem?   No   In the past 3 months, have you taken medications that affect  your immune system, such as prednisone, other steroids, or anticancer drugs; drugs for the treatment of rheumatoid arthritis, Crohn s disease, or psoriasis; or have you had radiation treatments?   No   Have you had a seizure, or a brain or other nervous system problem?   No   During the past year, have you received a transfusion of blood or blood    products, or been given immune (gamma) globulin or antiviral drug?   No   For women: Are you pregnant or is there a chance you could become       pregnant during the next month?   No   Have you received any vaccinations in the past 4 weeks?   No     Immunization questionnaire answers were all negative.      Patient instructed to remain in clinic for 15 minutes afterwards, and to report any adverse reactions.     Screening performed by Cecilia Ann on 5/29/2025 at 11:40 AM.

## 2025-05-29 NOTE — PROGRESS NOTES
"Nurse Note ( Pre-Travel Consult)    Itinerary:  [place]    Departure Date: 07/04    Return Date: 08/10    Length of Trip 1 month    Reason for Travel: Business         Urban or rural: both    Accommodations: Family home        IMMUNIZATION HISTORY  Have you received any immunizations within the past 4 weeks?  No  Have you ever fainted from having your blood drawn or from an injection?  No  Have you ever had a fever reaction to vaccination?  No  Have you ever had any bad reaction or side effect from any vaccination?  No  Do you live (or work closely) with anyone who has AIDS, an AIDS-like condition, any other immune disorder or who is on chemotherapy for cancer?  No  Do you have a family history of immunodeficiency?  No  Have you received any injection of immune globulin or any blood products during the past 12 months?  No    Patient roomed by Bryon Marshall  Yday Sellers is a 24 year old female seen today alone for counsultation for international travel.   Patient will be departing in  5 week(s) and  traveling alone  And meeting other students .    [facility]   5 week     Patient itinerary :  will be in the [facility] and [facility]  region of [place]  which risk for Dengue Fever, Chikungungya, Rabies, food borne illnesses, motor vehicle accidents, and Typhoid. exposure.      Patient's activities will include host family , beach activities, academic .    Patient's country of birth is [place]    Special medical concerns: Anxiety/depression  Pre-travel questionnaire was completed by patient and reviewed by provider.     Vitals: /72   Pulse 80   Temp 97.9  F (36.6  C) (Temporal)   Resp 16   Ht 1.819 m (5' 11.6\")   Wt 72.6 kg (160 lb)   LMP  (LMP Unknown)   SpO2 96%   BMI 21.94 kg/m    BMI= Body mass index is 21.94 kg/m .    EXAM:  General:  Well-nourished, well-developed in no acute distress.  Appears to be stated age, interacts appropriately and expresses understanding of " information given to patient.    Current Outpatient Medications   Medication Sig Dispense Refill    albuterol (PROAIR HFA/PROVENTIL HFA/VENTOLIN HFA) 108 (90 Base) MCG/ACT inhaler Inhale 2 puffs into the lungs every 6 hours as needed for shortness of breath or wheezing. 6.7 g 11    FLUoxetine (PROZAC) 20 MG capsule Take 1 capsule (20 mg) by mouth daily. 90 capsule 3    fluticasone (ARNUITY ELLIPTA) 100 MCG/ACT inhaler Inhale 1 puff into the lungs daily. 30 each 11    levonorgestrel (MIRENA) 20 MCG/24HR IUD 1 each (20 mcg) by Intrauterine route once      spironolactone (ALDACTONE) 100 MG tablet Take 1 tablet (100 mg) by mouth daily. 90 tablet 3     Patient Active Problem List   Diagnosis    Melanocytic nevi of trunk    Numerous moles    Immunity status testing    Adjustment disorder with mixed anxiety and depressed mood    Chronic allergic rhinitis due to animal hair and dander    Mild persistent asthma without complication    Family history of thyroid disorder    Acne vulgaris    Screening examination for pulmonary tuberculosis    Chronic bilateral low back pain without sciatica    Lipoma of skin and subcutaneous tissue    Acute bilateral low back pain without sciatica     No Known Allergies      Immunizations discussed include:   Covid 19: Up to date  Hepatitis A:  Up to date  Hepatitis B: Up to date  Influenza: Up to date  Typhoid: Ordered/given today, risks, benefits and side effects reviewed  Rabies: ordered reviewed managment of a animal bite or scratch (washing wound, seek medical care within 24 hours for post exposure prophylaxis )  Yellow Fever: Not indicated  Japanese Encephalitis: Not indicated  Meningococcus: Not indicated  Tetanus/Diphtheria: Ordered/given today, risks, benefits and side effects reviewed  Measles/Mumps/Rubella: Up to date  Cholera: Not needed  Polio: Up to date  Pneumococcal: Up to date  Varicella: Up to date  Shingrix: Not indicated  HPV:  Up to date   Chikunguya: Declined  Not  concerned about risk of disease   Mpox:  Not indicated     TB: low risk     Altitude Exposure on this trip: no  Past tolerance to Altitude: na    ASSESSMENT/PLAN:  Yady was seen today for travel clinic.    Diagnoses and all orders for this visit:    Travel advice encounter  -     azithromycin (ZITHROMAX) 500 MG tablet; Take 1 tablet (500 mg) by mouth daily for 3 doses. Take 1 tablet a day for up to 3 days for severe diarrhea  -     ondansetron (ZOFRAN ODT) 4 MG ODT tab; Take 1 tablet (4 mg) by mouth every 8 hours as needed for nausea or vomiting.    Other orders  -     TDAP 7+ (ADACEL,BOOSTRIX)  -     INFLUENZA VACCINE, SPLIT VIRUS, TRIVALENT,PF (FLUZONE\FLUARIX)  -     TYPHOID VACCINE, IM; Future  -     RABIES VACCINE, IM (IMOVAX); Standing  -     RABIES VACCINE, IM (IMOVAX)      I have reviewed general recommendations for safe travel   including: food/water precautions, insect precautions, safer sex   practices given high prevalence of Zika, HIV, MPox and other STDs,   roadway safety. Educational materials and [employer] report provided.    Malaraia prophylaxis recommended: none  Symptomatic treatment for traveler's diarrhea: azithromycin/zofran       Evacuation insurance advised and resources were provided to patient.    Total visit time 45 minutes  with over 50% of time spent counseling patient and shared decision making as detailed above.    Arabella Reeder CNP  Certificate in Travel Health

## 2025-05-29 NOTE — PATIENT INSTRUCTIONS
Thank you for visiting the [facility] : [phone]  Today May 29, 2025 you received the    Flu Vaccine  Rabies Vaccine -   Typhoid    June 5   Rabies #2  Typhoid - injectable. This vaccine is valid for two years.       Between 6/19/25- 3 years get the booster Rabies             Follow up vaccine appointments can be made as a NURSE ONLY visit at the Travel Clinic, (BE PREPARED TO WAIT, ) or at designated [facility].    If you are receiving the Rabies vaccines series, it is important that you follow the exact schedule ordered.     Pre-travel     We recommend that you purchase Medical Evacuation Insurance prior to your departure.  Https://wwwnc.cdc.gov/travel/page/insurance    [place] your travel plans with the [place] Department of State through STEP ( Smart Traveler Enrollment Program ) https://step.state.gov.  STEP is a free service to allow [place] citizens and nationals traveling and living abroad to enroll their trip with the nearest [place] Embassy or Consulate.    Animal Exposure: Avoid all mammals even if they look healthy.  If there is a bite, scratch or even a lick, wash area immediately with soap and water for 15 minutes and seek medical care within 24 hours for evaluation of Rabies post exposure treatment.  Contact your Medical Evacuation Insurance.    Repiratory illness prevention strategies ( Covid and Influenza ) CDC recommendations:  Consider wearing a mask in crowded or poorly ventilated indoor areas, including on public transportation and in transportation hubs.  Hand washing: frequent, thorough handwashing with soap and water for 20 seconds. Use an alcohol-based hand  with at least 60% alcohol if soap and water are not readily available. Avoid touching face, nose, eyes, mouth unless you have done appropriate hand washing as above.  VACCINES: Staying up to date on COVID-19 vaccines significantly lowers the risk of getting very sick, being  hospitalized, or dying from COVID-19. CDC recommends that everyone stay up to date on their COVID-19 vaccines, especially people with weakened immune systems.    Travel Covid 19 Testing:  updated 12/06/2021  International travelers: Pre-travel:  See country specific Embassy websites or airline websites.    Post travel: CDC recommends getting tested 3-5 days after your trip     Post-travel illness:  Contact your provider or [facility] if you develop a fever, rash, cough, diarrhea or other symptoms for up to 1 year after travel.  Inform your healthcare provider when and where you traveled to.    Please call the [facility] with any questions [phone]  Or send your provider a 'My Chart' note.

## 2025-06-05 ENCOUNTER — ALLIED HEALTH/NURSE VISIT (OUTPATIENT)
Dept: FAMILY MEDICINE | Facility: CLINIC | Age: 25
End: 2025-06-05
Payer: COMMERCIAL

## 2025-06-05 DIAGNOSIS — Z71.84 TRAVEL ADVICE ENCOUNTER: Primary | ICD-10-CM

## 2025-07-02 ENCOUNTER — VIRTUAL VISIT (OUTPATIENT)
Dept: FAMILY MEDICINE | Facility: CLINIC | Age: 25
End: 2025-07-02
Payer: COMMERCIAL

## 2025-07-02 DIAGNOSIS — J45.30 MILD PERSISTENT ASTHMA WITHOUT COMPLICATION: ICD-10-CM

## 2025-07-02 PROCEDURE — 98005 SYNCH AUDIO-VIDEO EST LOW 20: CPT | Performed by: FAMILY MEDICINE

## 2025-07-02 RX ORDER — BUDESONIDE AND FORMOTEROL FUMARATE DIHYDRATE 80; 4.5 UG/1; UG/1
2 AEROSOL RESPIRATORY (INHALATION) 2 TIMES DAILY
Qty: 10.2 G | Refills: 11 | Status: SHIPPED | OUTPATIENT
Start: 2025-07-02

## 2025-07-02 RX ORDER — ALBUTEROL SULFATE 90 UG/1
2 INHALANT RESPIRATORY (INHALATION) EVERY 6 HOURS PRN
Qty: 18 G | Refills: 2 | Status: SHIPPED | OUTPATIENT
Start: 2025-07-02

## 2025-07-02 ASSESSMENT — ASTHMA QUESTIONNAIRES
ACT_TOTALSCORE: 22
QUESTION_5 LAST FOUR WEEKS HOW WOULD YOU RATE YOUR ASTHMA CONTROL: WELL CONTROLLED
QUESTION_4 LAST FOUR WEEKS HOW OFTEN HAVE YOU USED YOUR RESCUE INHALER OR NEBULIZER MEDICATION (SUCH AS ALBUTEROL): NOT AT ALL
QUESTION_3 LAST FOUR WEEKS HOW OFTEN DID YOUR ASTHMA SYMPTOMS (WHEEZING, COUGHING, SHORTNESS OF BREATH, CHEST TIGHTNESS OR PAIN) WAKE YOU UP AT NIGHT OR EARLIER THAN USUAL IN THE MORNING: NOT AT ALL
QUESTION_1 LAST FOUR WEEKS HOW MUCH OF THE TIME DID YOUR ASTHMA KEEP YOU FROM GETTING AS MUCH DONE AT WORK, SCHOOL OR AT HOME: A LITTLE OF THE TIME
QUESTION_2 LAST FOUR WEEKS HOW OFTEN HAVE YOU HAD SHORTNESS OF BREATH: ONCE OR TWICE A WEEK

## 2025-07-02 NOTE — PROGRESS NOTES
Yady is a 24 year old who is being evaluated via a billable video visit.    How would you like to obtain your AVS? MyChart  If the video visit is dropped, the invitation should be resent by: Text to cell phone: [phone]  Will anyone else be joining your video visit? No      Assessment & Plan     Mild persistent asthma without complication  Plan: worried   - budesonide-formoterol (SYMBICORT/BREYNA) 80-4.5 MCG/ACT inhaler; Inhale 2 puffs into the lungs 2 times daily.  - albuterol (PROAIR HFA/PROVENTIL HFA/VENTOLIN HFA) 108 (90 Base) MCG/ACT inhaler; Inhale 2 puffs into the lungs every 6 hours as needed for shortness of breath, wheezing or cough.          Prescription drug management  I spent a total of 20 minutes on the day of the visit.   Time spent by me today doing chart review, history and exam, documentation and further activities per the note      Subjective   Yady is a 24 year old, presenting for the following health issues:  Asthma    History of Present Illness       She eats 2-3 servings of fruits and vegetables daily.She consumes 0 sweetened beverage(s) daily.She exercises with enough effort to increase her heart rate 30 to 60 minutes per day.  She exercises with enough effort to increase her heart rate 4 days per week. She is missing 1 dose(s) of medications per week.  She is not taking prescribed medications regularly due to remembering to take.            7/2/2025   Asthma   1.  In the past 4 weeks, how much of the time did your asthma keep you from getting as much done at work, school or at home? 4   2.  During the past 4 weeks, how often have you had shortness of breath? 4   3.  During the past 4 weeks, how often did your asthma symptoms (wheezing, coughing, shortness of breath, chest tightness or pain) wake you up at night or earlier than usual in the morning? 5   4.  During the past 4 weeks, how often have you used your rescue inhaler or nebulizer medication (such as albuterol)? 5   5.  How  would you rate your asthma control during the past 4 weeks? 4   ACT TOTAL SCORE (Goal Greater than or Equal to 20) 22    In the past 12 months, how many times did you visit the emergency room for your asthma without being admitted to the hospital? 0   In the past 12 months, how many times were you hospitalized overnight because of your asthma? 0   Do you have a cough, wheezing or shortness of breath? (!) TROUBLE WITH BREATHING (SHORTNESS OF BREATH)   What makes your asthma/breathing worse? Smoke    Dust mites    Pollens    Humidity    Emotions   Do you want more information about how to use your inhaler? No       Patient-reported    Multiple values from one day are sorted in reverse-chronological order   Traveling to [facility] for   Global health program-  for 5 weeks- leaving on July 4th    Asthma triggers include seasonal allergies/ pollen/ smoke of all  and humidity .    Concern is asthma control in humid climate.    Never picked up her ventolin   Harder to breath in humid days, anay if running - feels shortness of breath -otherwise arnuity / Fluticasone ICS -helps with asthma control.              Review of Systems  Constitutional, HEENT, cardiovascular, pulmonary, GI, , musculoskeletal, neuro, skin, endocrine and psych systems are negative, except as otherwise noted.      Objective           Vitals:  No vitals were obtained today due to virtual visit.    Physical Exam   GENERAL: alert and no distress  EYES: Eyes grossly normal to inspection.  No discharge or erythema, or obvious scleral/conjunctival abnormalities.  RESP: No audible wheeze, cough, or visible cyanosis.    SKIN: Visible skin clear. No significant rash, abnormal pigmentation or lesions.  NEURO: Cranial nerves grossly intact.  Mentation and speech appropriate for age.  PSYCH: Appropriate affect, tone, and pace of words          Video-Visit Details    Type of service:  Video Visit   Originating Location (pt.  Location): Home  Distant Location (provider location):  On-site  Platform used for Video Visit: Trisha  Signed Electronically by: Adriana Jimenez MD

## 2025-08-16 ENCOUNTER — E-VISIT (OUTPATIENT)
Dept: URGENT CARE | Facility: CLINIC | Age: 25
End: 2025-08-16
Payer: COMMERCIAL

## 2025-08-16 DIAGNOSIS — R30.0 DYSURIA: Primary | ICD-10-CM

## 2025-08-16 RX ORDER — NITROFURANTOIN 25; 75 MG/1; MG/1
100 CAPSULE ORAL 2 TIMES DAILY
Qty: 10 CAPSULE | Refills: 0 | Status: SHIPPED | OUTPATIENT
Start: 2025-08-16 | End: 2025-08-21

## (undated) DEVICE — DRSG STERI STRIP 1/2X4" R1547

## (undated) DEVICE — ESU ELEC BLADE 2.75" COATED/INSULATED E1455

## (undated) DEVICE — PREP CHLORAPREP 26ML TINTED HI-LITE ORANGE 930815

## (undated) DEVICE — SOL WATER IRRIG 1000ML BOTTLE 2F7114

## (undated) DEVICE — SOL NACL 0.9% IRRIG 1000ML BOTTLE 2F7124

## (undated) DEVICE — ESU PENCIL W/SMOKE EVAC NEPTUNE STRYKER 0703-046-000

## (undated) DEVICE — PACK MINOR SBA15MIFSE

## (undated) DEVICE — SU MONOCRYL 4-0 PS-2 18" UND Y496G

## (undated) DEVICE — GLOVE BIOGEL PI MICRO SZ 6.5 48565

## (undated) DEVICE — LINEN TOWEL PACK X5 5464

## (undated) DEVICE — DRAPE SPLIT EENT 76X124" 3X28" 9447

## (undated) DEVICE — SU VICRYL 3-0 SH 27" J316H

## (undated) DEVICE — NDL 22GA 1.5"

## (undated) DEVICE — DECANTER VIAL 2006S

## (undated) DEVICE — GLOVE BIOGEL PI MICRO INDICATOR UNDERGLOVE SZ 6.5 48965

## (undated) RX ORDER — ACETAMINOPHEN 325 MG/1
TABLET ORAL
Status: DISPENSED
Start: 2023-04-10

## (undated) RX ORDER — PROPOFOL 10 MG/ML
INJECTION, EMULSION INTRAVENOUS
Status: DISPENSED
Start: 2023-04-10

## (undated) RX ORDER — LIDOCAINE HYDROCHLORIDE AND EPINEPHRINE 10; 10 MG/ML; UG/ML
INJECTION, SOLUTION INFILTRATION; PERINEURAL
Status: DISPENSED
Start: 2023-04-10

## (undated) RX ORDER — FENTANYL CITRATE 50 UG/ML
INJECTION, SOLUTION INTRAMUSCULAR; INTRAVENOUS
Status: DISPENSED
Start: 2023-04-10